# Patient Record
Sex: FEMALE | Race: WHITE | Employment: OTHER | URBAN - METROPOLITAN AREA
[De-identification: names, ages, dates, MRNs, and addresses within clinical notes are randomized per-mention and may not be internally consistent; named-entity substitution may affect disease eponyms.]

---

## 2017-01-24 ENCOUNTER — OFFICE VISIT (OUTPATIENT)
Dept: INTERNAL MEDICINE CLINIC | Facility: CLINIC | Age: 82
End: 2017-01-24

## 2017-01-24 VITALS
DIASTOLIC BLOOD PRESSURE: 67 MMHG | TEMPERATURE: 98 F | SYSTOLIC BLOOD PRESSURE: 161 MMHG | WEIGHT: 151 LBS | BODY MASS INDEX: 27.79 KG/M2 | HEART RATE: 76 BPM | HEIGHT: 62 IN

## 2017-01-24 DIAGNOSIS — E11.9 TYPE 2 DIABETES MELLITUS WITHOUT COMPLICATION, WITHOUT LONG-TERM CURRENT USE OF INSULIN (HCC): ICD-10-CM

## 2017-01-24 DIAGNOSIS — I10 ESSENTIAL HYPERTENSION: ICD-10-CM

## 2017-01-24 DIAGNOSIS — E53.8 VITAMIN B12 DEFICIENCY: ICD-10-CM

## 2017-01-24 DIAGNOSIS — E78.5 HYPERLIPIDEMIA, UNSPECIFIED HYPERLIPIDEMIA TYPE: ICD-10-CM

## 2017-01-24 DIAGNOSIS — Z12.31 VISIT FOR SCREENING MAMMOGRAM: ICD-10-CM

## 2017-01-24 DIAGNOSIS — Z78.0 POSTMENOPAUSAL: ICD-10-CM

## 2017-01-24 DIAGNOSIS — Z00.00 ENCOUNTER FOR MEDICARE ANNUAL WELLNESS EXAM: Primary | ICD-10-CM

## 2017-01-24 DIAGNOSIS — E53.8 B12 DEFICIENCY: ICD-10-CM

## 2017-01-24 DIAGNOSIS — Z12.11 SCREEN FOR COLON CANCER: ICD-10-CM

## 2017-01-24 PROCEDURE — 99213 OFFICE O/P EST LOW 20 MIN: CPT | Performed by: INTERNAL MEDICINE

## 2017-01-24 PROCEDURE — G0439 PPPS, SUBSEQ VISIT: HCPCS | Performed by: INTERNAL MEDICINE

## 2017-01-24 RX ORDER — HYDROCHLOROTHIAZIDE 25 MG/1
25 TABLET ORAL DAILY
Qty: 90 TABLET | Refills: 3 | Status: ON HOLD | OUTPATIENT
Start: 2017-01-24 | End: 2017-05-30

## 2017-01-24 RX ORDER — METOPROLOL TARTRATE 50 MG/1
50 TABLET, FILM COATED ORAL 2 TIMES DAILY
Qty: 180 TABLET | Refills: 1 | Status: SHIPPED | OUTPATIENT
Start: 2017-01-24 | End: 2017-07-21

## 2017-01-24 RX ORDER — CELECOXIB 200 MG/1
200 CAPSULE ORAL DAILY
Qty: 90 CAPSULE | Refills: 0 | Status: SHIPPED | OUTPATIENT
Start: 2017-01-24 | End: 2017-05-06

## 2017-01-24 RX ORDER — BENAZEPRIL HYDROCHLORIDE 40 MG/1
40 TABLET, FILM COATED ORAL DAILY
Qty: 90 TABLET | Refills: 3 | Status: SHIPPED | OUTPATIENT
Start: 2017-01-24 | End: 2018-03-22

## 2017-01-24 RX ORDER — LOPERAMIDE HYDROCHLORIDE 2 MG/1
2 CAPSULE ORAL 4 TIMES DAILY PRN
COMMUNITY

## 2017-01-24 RX ORDER — GLUTAMINE 500 MG
2 CAPSULE ORAL 2 TIMES DAILY
COMMUNITY
End: 2020-02-18

## 2017-01-24 RX ORDER — CYANOCOBALAMIN 1000 UG/ML
INJECTION INTRAMUSCULAR; SUBCUTANEOUS
Qty: 6 VIAL | Refills: 3 | Status: SHIPPED | OUTPATIENT
Start: 2017-01-24 | End: 2018-02-13

## 2017-01-24 RX ORDER — AMLODIPINE BESYLATE 10 MG/1
10 TABLET ORAL DAILY
Qty: 90 TABLET | Refills: 3 | Status: SHIPPED | OUTPATIENT
Start: 2017-01-24 | End: 2018-01-31

## 2017-01-24 RX ORDER — AMINO ACIDS/MV,IRON,MIN
TABLET ORAL
Status: ON HOLD | COMMUNITY
End: 2017-05-28

## 2017-01-25 NOTE — PROGRESS NOTES
HPI:    Patient ID: Tabby Sands is a 80year old female.     HPI    Establish care  Here for Physical examinaiton/ medicare annual and  Follow up of chornic medical problems including but not limited to   Diabetes type   She moved from [facility] in Oct 2016  Gretchen Pink SURGICAL HISTORY      Comment ileum resected bc of radiation damage    HERNIA SURGERY  8799    Comment umbilical hernia     KNEE ARTHROSCOPY  1997    Comment torn cartilage    HYSTERECTOMY  1995    Comment total    APPENDECTOMY  1954    BIOPSY OF STOMACH,B abdominal pain, diarrhea, constipation and blood in stool. Genitourinary: Negative for dysuria, urgency, frequency, hematuria and difficulty urinating. Musculoskeletal: Positive for back pain, arthralgias and gait problem. Negative for joint swelling. disease)    • Arthritis    • Essential hypertension    • Back problem    • Spinal stenosis    • Degenerative disc disease, lumbar    • Diabetes (HCC)      diet controlled   • Garcia's esophagus determined by endoscopy    • B12 deficiency      on monthly s rhythm, S1 normal, S2 normal, normal heart sounds and intact distal pulses. Exam reveals no gallop. No murmur heard. Pulmonary/Chest: Effort normal and breath sounds normal. No respiratory distress. She has no wheezes. She has no rales.  She exhibits n (CPT=77080)        dexa ordered      (Z12.31) Visit for screening mammogram  Plan: [facility] SCREENING BILAT (CPT=77067)        asymptoamtic monitor    (Z12.11) Screen for colon cancer  Comment: last was 2 years ago in [address] [place] [address] St: complted    (E53.8) Vitamin Yes    Difficulty dressing or bathing?: No    Problems with daily activities? : Yes    Memory Problems?: No      Fall/Risk Assessment     Do you have 3 or more medical conditions?: 0-No    Have you fallen in the last 12 months?: 0-No    Do you accidently l Metoprolol Tartrate 50 MG Oral Tab Take 1 tablet (50 mg total) by mouth 2 (two) times daily. Disp: 180 tablet Rfl: 1   hydrochlorothiazide 25 MG Oral Tab Take 1 tablet (25 mg total) by mouth daily.  Disp: 90 tablet Rfl: 3   celecoxib 200 MG Oral Cap Take Mother      cva at 80      SOCIAL HISTORY:     Smoking Status: Former Smoker                   Packs/Day: 0.20  Years: 3         Types: Cigarettes    Smokeless Status: Former User                       Alcohol Use: Yes           0.0 oz/week       0 Standar for: FOB No flowsheet data found. Glaucoma Screening      Ophthalmology Visit Annually Yearly advised    Bone Density Screening      Dexascan Every two years No results found for this or any previous visit. No flowsheet data found.     Pap and Pelvic COPD      Spirometry Testing Annually No results found for this or any previous visit. No flowsheet data found.       SCREENING SCHEDULE - FEMALE      SCREEN COVERAGE SCHEDULE  (If Indicated) LAST DONE   Dexa If at Risk q2 years ordered   Lipids All Vanessa Oral Tab 90 tablet 3      Sig: Take 1 tablet (25 mg total) by mouth daily. celecoxib 200 MG Oral Cap 90 capsule 0      Sig: Take 1 capsule (200 mg total) by mouth daily.       tramadol-acetaminophen 37.5-325 MG Oral Tab 90 tablet 0      Sig: Take 1 tab

## 2017-01-27 NOTE — TELEPHONE ENCOUNTER
Pt states that she was in the office on 1-23-17 and doctor forget to call in a script for omeprazole, tramadol -acetaminophen 2 tablets every 6 hours. Pt would like refills on them.  Pharmacy: [facility]     Current Outpatient Prescriptions:  trama

## 2017-01-30 RX ORDER — OMEPRAZOLE 20 MG/1
20 CAPSULE, DELAYED RELEASE ORAL
Qty: 90 CAPSULE | Refills: 0 | Status: SHIPPED | OUTPATIENT
Start: 2017-01-30 | End: 2017-04-15

## 2017-01-30 NOTE — TELEPHONE ENCOUNTER
Refill Protocol Appointment Criteria  · Appointment scheduled in the past 12 months or in the next 3 months  Recent Visits       Provider Department Primary Dx    6 days ago Justin Castro MD [facility], [facility], [address] [facility], [address]

## 2017-01-30 NOTE — TELEPHONE ENCOUNTER
Pharmacy      [place] [id number] [facility], [place] - [address] [facility], [facility], [phone], [phone]       Medication Detail         Disp Refills Start End        tramadol-acetaminophen 37.5-325 MG Oral Tab 90 ta

## 2017-04-06 ENCOUNTER — TELEPHONE (OUTPATIENT)
Dept: INTERNAL MEDICINE CLINIC | Facility: CLINIC | Age: 82
End: 2017-04-06

## 2017-04-08 NOTE — TELEPHONE ENCOUNTER
Refill Protocol Appointment Criteria  · Appointment scheduled in the past 6 months or in the next 3 months  Recent Visits       Provider Department Primary Dx    2 months ago Megan Butterfield MD [facility], [facility], [address] [facility], [address] [facility]

## 2017-04-10 NOTE — TELEPHONE ENCOUNTER
Pharmacy      [place] [id number] [facility], [place] - [address], [phone], [phone]       Medication Detail         Disp Refills Start End        tramadol-acetaminophen 37.5-325 MG Oral Tab 60 ta

## 2017-04-10 NOTE — TELEPHONE ENCOUNTER
Pt is calling to follow up on refill request, pt states [facility] has been calling and has not heard back. Pt states she really needs her medication.  Please advise

## 2017-04-13 ENCOUNTER — TELEPHONE (OUTPATIENT)
Dept: INTERNAL MEDICINE CLINIC | Facility: CLINIC | Age: 82
End: 2017-04-13

## 2017-04-15 RX ORDER — OMEPRAZOLE 20 MG/1
20 CAPSULE, DELAYED RELEASE ORAL
Qty: 90 CAPSULE | Refills: 0 | Status: SHIPPED | OUTPATIENT
Start: 2017-04-15 | End: 2017-07-10

## 2017-04-15 NOTE — TELEPHONE ENCOUNTER
Refill Protocol Appointment Criteria: Refilled per protocol    · Appointment scheduled in the past 12 months or in the next 3 months  Recent Visits       Provider Department Primary Dx    2 months ago Yin Stanley MD [facility], [facility], [address]

## 2017-04-18 NOTE — TELEPHONE ENCOUNTER
I called [address] who stated the patient picked up the Tramadol on 4/10/17. The patient is requesting another refill    LMB transfer to [phone] or [phone] on 4/18/17 for the patient. Why she is requesting a refill so soon.

## 2017-04-20 NOTE — TELEPHONE ENCOUNTER
Carmita Smith calling states rx pt picke dup on 04/10 was for 10 days. Pt requesting refill for at least 30 days with refills if possible. Please advise.

## 2017-05-01 ENCOUNTER — TELEPHONE (OUTPATIENT)
Dept: INTERNAL MEDICINE CLINIC | Facility: CLINIC | Age: 82
End: 2017-05-01

## 2017-05-01 NOTE — TELEPHONE ENCOUNTER
Current Outpatient Prescriptions:  celecoxib 200 MG Oral Cap Take 1 capsule (200 mg total) by mouth daily.  Disp: 90 capsule Rfl: 0     Pharmacy said fax sent- nothing in Epic

## 2017-05-06 NOTE — TELEPHONE ENCOUNTER
From: Gaurav Leon  To: Adam Pedro MD  Sent: 5/1/2017 7:00 PM CDT  Subject: Medication Renewal Request    Original authorizing provider: MD Gaurav Diaz Leon would like a refill of the following medications:  celecoxib 200 MG Oral Cap

## 2017-05-08 RX ORDER — CELECOXIB 200 MG/1
200 CAPSULE ORAL DAILY
Qty: 90 CAPSULE | Refills: 0 | Status: ON HOLD | OUTPATIENT
Start: 2017-05-08 | End: 2017-05-28

## 2017-05-09 NOTE — TELEPHONE ENCOUNTER
Rx request for Celecoxib 200 mg addressed and filled by MD on 5/8/17 #90 with 0 refills. No further action required at this time.

## 2017-05-17 NOTE — TELEPHONE ENCOUNTER
From: David Hernandez  To: Aramis Cheung MD  Sent: 5/16/2017 11:34 AM CDT  Subject: Medication Renewal Request    Original authorizing provider: Merilynn Apley, MD Dimas Lunger would like a refill of the following medications:  tramadol-acetaminophen

## 2017-05-27 ENCOUNTER — APPOINTMENT (OUTPATIENT)
Dept: GENERAL RADIOLOGY | Facility: HOSPITAL | Age: 82
DRG: 087 | End: 2017-05-27
Attending: EMERGENCY MEDICINE
Payer: MEDICARE

## 2017-05-27 ENCOUNTER — APPOINTMENT (OUTPATIENT)
Dept: CT IMAGING | Facility: HOSPITAL | Age: 82
DRG: 087 | End: 2017-05-27
Attending: EMERGENCY MEDICINE
Payer: MEDICARE

## 2017-05-27 ENCOUNTER — HOSPITAL ENCOUNTER (INPATIENT)
Facility: HOSPITAL | Age: 82
LOS: 2 days | Discharge: HOME | DRG: 087 | End: 2017-05-30
Attending: EMERGENCY MEDICINE | Admitting: HOSPITALIST
Payer: MEDICARE

## 2017-05-27 DIAGNOSIS — R55 SYNCOPE AND COLLAPSE: Primary | ICD-10-CM

## 2017-05-27 DIAGNOSIS — D64.9 ANEMIA, UNSPECIFIED TYPE: ICD-10-CM

## 2017-05-27 PROBLEM — E87.6 HYPOKALEMIA: Status: ACTIVE | Noted: 2017-05-27

## 2017-05-27 PROBLEM — R79.89 AZOTEMIA: Status: ACTIVE | Noted: 2017-05-27

## 2017-05-27 PROBLEM — R73.9 HYPERGLYCEMIA: Status: ACTIVE | Noted: 2017-05-27

## 2017-05-27 PROCEDURE — 72125 CT NECK SPINE W/O DYE: CPT | Performed by: EMERGENCY MEDICINE

## 2017-05-27 PROCEDURE — 71010 XR CHEST AP PORTABLE  (CPT=71010): CPT | Performed by: EMERGENCY MEDICINE

## 2017-05-27 PROCEDURE — 70450 CT HEAD/BRAIN W/O DYE: CPT | Performed by: EMERGENCY MEDICINE

## 2017-05-27 RX ORDER — ASPIRIN 81 MG/1
324 TABLET, CHEWABLE ORAL ONCE
Status: COMPLETED | OUTPATIENT
Start: 2017-05-27 | End: 2017-05-27

## 2017-05-27 RX ORDER — POTASSIUM CHLORIDE 20 MEQ/1
40 TABLET, EXTENDED RELEASE ORAL ONCE
Status: COMPLETED | OUTPATIENT
Start: 2017-05-27 | End: 2017-05-27

## 2017-05-28 ENCOUNTER — APPOINTMENT (OUTPATIENT)
Dept: ULTRASOUND IMAGING | Facility: HOSPITAL | Age: 82
DRG: 087 | End: 2017-05-28
Attending: HOSPITALIST
Payer: MEDICARE

## 2017-05-28 ENCOUNTER — APPOINTMENT (OUTPATIENT)
Dept: CT IMAGING | Facility: HOSPITAL | Age: 82
DRG: 087 | End: 2017-05-28
Attending: EMERGENCY MEDICINE
Payer: MEDICARE

## 2017-05-28 ENCOUNTER — APPOINTMENT (OUTPATIENT)
Dept: CT IMAGING | Facility: HOSPITAL | Age: 82
DRG: 087 | End: 2017-05-28
Attending: HOSPITALIST
Payer: MEDICARE

## 2017-05-28 PROBLEM — D64.9 ANEMIA, UNSPECIFIED TYPE: Status: ACTIVE | Noted: 2017-05-28

## 2017-05-28 PROCEDURE — 71260 CT THORAX DX C+: CPT | Performed by: EMERGENCY MEDICINE

## 2017-05-28 PROCEDURE — 93970 EXTREMITY STUDY: CPT | Performed by: HOSPITALIST

## 2017-05-28 PROCEDURE — 99222 1ST HOSP IP/OBS MODERATE 55: CPT | Performed by: HOSPITALIST

## 2017-05-28 PROCEDURE — 70450 CT HEAD/BRAIN W/O DYE: CPT | Performed by: HOSPITALIST

## 2017-05-28 RX ORDER — GLUTAMINE 500 MG
1 CAPSULE ORAL DAILY
Status: DISCONTINUED | OUTPATIENT
Start: 2017-05-28 | End: 2017-05-28 | Stop reason: RX

## 2017-05-28 RX ORDER — POTASSIUM CHLORIDE 20 MEQ/1
40 TABLET, EXTENDED RELEASE ORAL EVERY 4 HOURS
Status: DISPENSED | OUTPATIENT
Start: 2017-05-28 | End: 2017-05-28

## 2017-05-28 RX ORDER — HEPARIN SODIUM 5000 [USP'U]/ML
5000 INJECTION, SOLUTION INTRAVENOUS; SUBCUTANEOUS EVERY 12 HOURS SCHEDULED
Status: DISCONTINUED | OUTPATIENT
Start: 2017-05-28 | End: 2017-05-28

## 2017-05-28 RX ORDER — PANTOPRAZOLE SODIUM 20 MG/1
20 TABLET, DELAYED RELEASE ORAL
Status: DISCONTINUED | OUTPATIENT
Start: 2017-05-28 | End: 2017-05-30

## 2017-05-28 RX ORDER — LISINOPRIL 40 MG/1
40 TABLET ORAL DAILY
Status: DISCONTINUED | OUTPATIENT
Start: 2017-05-28 | End: 2017-05-30

## 2017-05-28 RX ORDER — DEXTROSE MONOHYDRATE 25 G/50ML
50 INJECTION, SOLUTION INTRAVENOUS AS NEEDED
Status: DISCONTINUED | OUTPATIENT
Start: 2017-05-28 | End: 2017-05-30

## 2017-05-28 RX ORDER — ONDANSETRON 2 MG/ML
4 INJECTION INTRAMUSCULAR; INTRAVENOUS EVERY 4 HOURS PRN
Status: DISCONTINUED | OUTPATIENT
Start: 2017-05-28 | End: 2017-05-30

## 2017-05-28 RX ORDER — SODIUM CHLORIDE 9 MG/ML
INJECTION, SOLUTION INTRAVENOUS CONTINUOUS
Status: DISCONTINUED | OUTPATIENT
Start: 2017-05-28 | End: 2017-05-30

## 2017-05-28 RX ORDER — METOPROLOL TARTRATE 50 MG/1
50 TABLET, FILM COATED ORAL 2 TIMES DAILY
Status: DISCONTINUED | OUTPATIENT
Start: 2017-05-28 | End: 2017-05-30

## 2017-05-28 RX ORDER — SODIUM CHLORIDE 9 MG/ML
INJECTION, SOLUTION INTRAVENOUS CONTINUOUS
Status: DISPENSED | OUTPATIENT
Start: 2017-05-28 | End: 2017-05-28

## 2017-05-28 RX ORDER — ASPIRIN 325 MG
325 TABLET ORAL DAILY
Status: DISCONTINUED | OUTPATIENT
Start: 2017-05-28 | End: 2017-05-28

## 2017-05-28 RX ORDER — NITROGLYCERIN 0.4 MG/1
0.4 TABLET SUBLINGUAL EVERY 5 MIN PRN
Status: DISCONTINUED | OUTPATIENT
Start: 2017-05-28 | End: 2017-05-30

## 2017-05-28 NOTE — ED INITIAL ASSESSMENT (HPI)
Pt received via EMS for syncopal episode while at the movies. Pt states she was walking out of the movie theatre and woke up on the floor. Hematoma noted to left temporal. Denies any anticoagulants. Alert and oriented x4.

## 2017-05-28 NOTE — CONSULTS
[facility]    PATIENT'S NAME: Naye Bárbara   ATTENDING PHYSICIAN: Darlyn Mackay MD   CONSULTING PHYSICIAN: Marlo Paul MD   PATIENT ACCOUNT#:   [id number]    LOCATION:  [address] [place] [address] #:   [id number]       LAWRENCE fissure. There is also mild frontal atrophy with enlarged subarachnoid space in the frontal region, but it showed otherwise no significant abnormality.   She had significant limitations with shoulder movement because of severe arthritis, and she normally t both the CT scan from last night and the CT scan from this morning. She has a small globular amount of blood in the left temporal area. There was no obvious change on today's study compared to the one done last night.   She will have a repeat CT scan of t

## 2017-05-28 NOTE — BH PROGRESS NOTE
ADMISSION NOTE    80year old female with h/o HTN, chronic pain syndrome, diet controlled DM presents after a drop attack. There was no prodrome. . Available medical records partially reviewed. Dictation to follow.     Júnior Moreno M.D.  5/28/2017

## 2017-05-28 NOTE — PROGRESS NOTES
Radiology called reported update of findings on ct of spine.  MD HA paged and reported the above findings awaiting return call

## 2017-05-28 NOTE — PROGRESS NOTES
Please refer to Dr. Doshi Must admit note for details  -CT scan noted for [facility] today  -patient neurologically intact  -plan repeat CT now  -stopped asa and sq heparin    Emma Dumont MD

## 2017-05-28 NOTE — CONSULTS
/47 mmHg  Pulse 65  Temp(Src) 98 °F (36.7 °C) (Oral)  Resp 20  Ht 5' 1\" (1.549 m)  Wt 151 lb 12.8 oz (68.856 kg)  BMI 28.70 kg/m2  SpO2 95%    Full consult dictated. Mrs. Fabiola Quintana sustained a closed head injury and has a small SAH in the left tempo

## 2017-05-28 NOTE — ED PROVIDER NOTES
Patient Seen in: [facility] AND [facility] Emergency Department    History   Patient presents with:  Syncope (cardiovascular, neurologic)    Stated Complaint: syncopal    HPI    80-year-old female with history of hypertension, spinal stenosis, GERD and hyperlipi tramadol-acetaminophen 37.5-325 MG Oral Tab,  Take 2 tablets by mouth every 8 (eight) hours as needed for Pain. celecoxib 200 MG Oral Cap,  Take 1 capsule (200 mg total) by mouth daily.    celecoxib 200 MG Oral Cap,  Take 1 capsule (200 mg total) by mouth Temporal   SpO2 05/27/17 2109 98 %   O2 Device 05/27/17 2109 None (Room air)       Current:/56 mmHg  Pulse 95  Temp(Src) 97.8 °F (36.6 °C) (Temporal)  Resp 18  Ht 154.9 cm (5' 1\")  Wt 63.504 kg  BMI 26.47 kg/m2  SpO2 99%        Physical Exam    Cons following:     RBC 3.30 (*)     HGB 10.5 (*)     HCT 31.6 (*)     All other components within normal limits   TROPONIN I, 0 HOUR - Normal   TROPONIN I, 2 HOUR - Normal   CBC WITH DIFFERENTIAL WITH PLATELET    Narrative:      The following orders were create acute fracture. Mild multilevel spondylolisthesis, likely due to chronic degenerative disease. Severe degenerative changes. Case discussed with  Dr. Mannie Berger at 11:27 PM ET. Geovanni Barkley M.D.   This report has been electronically signed and verif

## 2017-05-28 NOTE — H&P
[facility]    PATIENT'S NAME: Cecilia Silva   ATTENDING PHYSICIAN: John Aguirre MD   PATIENT ACCOUNT#:   [id number]    LOCATION:  [address] [place] [address] #:   [id number]       YOB: 1932  ADMISSION DATE:       05/2 post hysterectomy with remote radiation treatments, which has caused her chronic diarrhea. 7.   Garcia's esophagus seen on the EGD in the past.  The patient states her most recent EGD a year ago did not reveal Garcia's.   8.   B12 deficiency, on bimonth VITAL SIGNS:  Temperature 97.5, pulse 98, respiratory rate 18, blood pressure 125/72. HEENT:  There is a hematoma and superficial abrasion without laceration to the anterior left temporal area. No other obvious signs of trauma seen.   Pupils are equal, elevated D-dimer, a CT scan of the chest to rule out PE was performed. This revealed no evidence for pulmonary embolism or thoracic aortic dissection or aneurysm.   There were some scattered subsegmental atelectasis, no consolidation, and severe degenerati above.    Dictated By Jazlyn Dunlap MD  d: 05/28/2017 05:15:01  t: 05/28/2017 05:51:38  [facility] [id number]  QKX/

## 2017-05-29 ENCOUNTER — APPOINTMENT (OUTPATIENT)
Dept: CT IMAGING | Facility: HOSPITAL | Age: 82
DRG: 087 | End: 2017-05-29
Attending: PHYSICIAN ASSISTANT
Payer: MEDICARE

## 2017-05-29 ENCOUNTER — APPOINTMENT (OUTPATIENT)
Dept: CV DIAGNOSTICS | Facility: HOSPITAL | Age: 82
DRG: 087 | End: 2017-05-29
Attending: HOSPITALIST
Payer: MEDICARE

## 2017-05-29 PROCEDURE — 99233 SBSQ HOSP IP/OBS HIGH 50: CPT | Performed by: HOSPITALIST

## 2017-05-29 PROCEDURE — 70450 CT HEAD/BRAIN W/O DYE: CPT | Performed by: PHYSICIAN ASSISTANT

## 2017-05-29 PROCEDURE — 93306 TTE W/DOPPLER COMPLETE: CPT | Performed by: HOSPITALIST

## 2017-05-29 RX ORDER — MAGNESIUM OXIDE 400 MG (241.3 MG MAGNESIUM) TABLET
800 TABLET ONCE
Status: COMPLETED | OUTPATIENT
Start: 2017-05-29 | End: 2017-05-29

## 2017-05-29 NOTE — PROGRESS NOTES
[facility]    Progress Note    Matthew Florez Patient Status:  Inpatient    10/25/1932 MRN [id number]   Location [facility] 3W/SW Attending Vinh Sanchez MD   Hosp Day # 2 PCP Nasreen Kim MD       Subjective:   Michelle Armando hemorrhage in the left subinsular cortex is not significantly changed. No acute interval changes. Left frontotemporal scalp contusion.       Ct Brain Or Head (63820)    5/28/2017  CONCLUSION: Mild microvascular ischemic disease with unchanged tiny hyperden 2. No acute intracranial hemorrhage. 3. Superficial left frontal temporal calvarial soft tissue scalp hematoma. 4. Incidental osteoma within the right frontal sinus measures 2 x 1 cm.     Dictated by (CST): Vanesa Rosario M.D. on 5/28/2017 at 7:15 1. Normal venous duplex exam. No deep venous thrombosis. 2. Large left popliteal fossa Baker's cyst measures 6 x 2.6 x 1.9 cm. Xr Chest Ap Portable  (cpt=71010)    5/28/2017  CONCLUSION:  No acute cardiopulmonary abnormality.          Ct Chest Pain/ Certification of Need for Inpatient Hospitalization - Initial Certification      Patient will require inpatient services that will reasonably be expected to span two midnight's based on the clinical documentation in H+P.      Based on patients current state

## 2017-05-29 NOTE — PROGRESS NOTES
S: Ms. Sanabria Brittle resting comfortably in bed. She has no complaints at this time except that she wants to go home. She is supposed to have a stress test, but it will not be done until tomorrow.       O: Temp:  [97 °F (36.1 °C)-98 °F (36.7 °C)] 97.3 °F (36.3 °

## 2017-05-30 ENCOUNTER — APPOINTMENT (OUTPATIENT)
Dept: CV DIAGNOSTICS | Facility: HOSPITAL | Age: 82
DRG: 087 | End: 2017-05-30
Attending: HOSPITALIST
Payer: MEDICARE

## 2017-05-30 ENCOUNTER — APPOINTMENT (OUTPATIENT)
Dept: NUCLEAR MEDICINE | Facility: HOSPITAL | Age: 82
DRG: 087 | End: 2017-05-30
Attending: HOSPITALIST
Payer: MEDICARE

## 2017-05-30 VITALS
WEIGHT: 150.81 LBS | BODY MASS INDEX: 28.47 KG/M2 | TEMPERATURE: 98 F | DIASTOLIC BLOOD PRESSURE: 53 MMHG | SYSTOLIC BLOOD PRESSURE: 130 MMHG | RESPIRATION RATE: 20 BRPM | OXYGEN SATURATION: 99 % | HEART RATE: 86 BPM | HEIGHT: 61 IN

## 2017-05-30 PROCEDURE — 93017 CV STRESS TEST TRACING ONLY: CPT | Performed by: HOSPITALIST

## 2017-05-30 PROCEDURE — 99239 HOSP IP/OBS DSCHRG MGMT >30: CPT | Performed by: HOSPITALIST

## 2017-05-30 PROCEDURE — 78452 HT MUSCLE IMAGE SPECT MULT: CPT | Performed by: HOSPITALIST

## 2017-05-30 RX ORDER — MAGNESIUM OXIDE 400 MG (241.3 MG MAGNESIUM) TABLET
800 TABLET ONCE
Status: COMPLETED | OUTPATIENT
Start: 2017-05-30 | End: 2017-05-30

## 2017-05-30 RX ORDER — POTASSIUM CHLORIDE 20 MEQ/1
40 TABLET, EXTENDED RELEASE ORAL ONCE
Status: COMPLETED | OUTPATIENT
Start: 2017-05-30 | End: 2017-05-30

## 2017-05-30 RX ORDER — 0.9 % SODIUM CHLORIDE 0.9 %
VIAL (ML) INJECTION
Status: COMPLETED
Start: 2017-05-30 | End: 2017-05-30

## 2017-05-30 NOTE — PROGRESS NOTES
[facility]    Neurosurgery Progress Note    Rissa Mosqueda Patient Status:  Inpatient    10/25/1932 MRN [id number]   Location [facility] 3W/SW Attending Jeanna Watts MD   Hosp Day # 3 PCP Nasreen Recinos MD     Subjective: Non-tender, no lower extremity edema noted. Imaging:  Ct Brain Or Head (00937)    5/29/2017  CONCLUSION:   Punctate hyperdense focus of hemorrhage in the left subinsular cortex is not significantly changed. No acute interval changes.   Left frontotem

## 2017-05-30 NOTE — DISCHARGE PLANNING
Patient A/O. Denies any pain or SOB. Patient to be discharged to home with daughter. Discharge instructions included medications, prescriptions, safety, and fluid intake. Reviewed with patient. Patient verbalized understanding and compliance.  Tele monitor

## 2017-05-30 NOTE — DISCHARGE SUMMARY
[facility] AND [facility]  Discharge Summary    Donovan Coil Patient Status:  Inpatient    10/25/1932 MRN [id number]   Location [facility] 3W/SW Attending Kasey Jenkins MD   Hosp Day # 3 PCP Nasreen Arevalo MD     Date of Admission: 2017    D [address] [address] [place] [address]  [phone]      Patient will schedule diabetes follow-up appointment      Follow up Labs: n/a     History of Present Illness:   Per Dr. Shey Grajeda Syncopal episode.     HISTORY OF PRESENT ILLNESS:  This is a insulin.     Hypotension.   -hold diuretics on discharge    HTN  -resume home meds aside from hctz     Spinal stenosis and severe diffuse arthritis.  Continue home pain medications.     History of uterine cancer, status post radiation treatments with chroni

## 2017-05-31 ENCOUNTER — TELEPHONE (OUTPATIENT)
Dept: OTHER | Facility: HOSPITAL | Age: 82
End: 2017-05-31

## 2017-06-07 ENCOUNTER — TELEPHONE (OUTPATIENT)
Dept: INTERNAL MEDICINE CLINIC | Facility: CLINIC | Age: 82
End: 2017-06-07

## 2017-06-12 NOTE — TELEPHONE ENCOUNTER
Pt is calling about this refill.  She is highly frustrated that it has been since 06/07 that she requested this refill      Current outpatient prescriptions:     •  tramadol-acetaminophen 37.5-325 MG Oral Tab, Take 2 tablets by mouth every 8 (eight) hours a

## 2017-06-30 NOTE — TELEPHONE ENCOUNTER
From: Kendaller Push  Sent: 6/27/2017 10:23 PM CDT  Subject: Medication Renewal Request    Adolph Neftali.  Lizett Munson would like a refill of the following medications:  tramadol-acetaminophen 37.5-325 MG Oral Tab Virgil Murrieta MD]    Preferred pharmacy: [address]

## 2017-07-05 NOTE — TELEPHONE ENCOUNTER
Patient called and stated spoke with [facility] regarding refill has not received-    Patient still waiting  Requesting a call back from nurse-

## 2017-07-10 RX ORDER — OMEPRAZOLE 20 MG/1
CAPSULE, DELAYED RELEASE ORAL
Qty: 90 CAPSULE | Refills: 1 | Status: SHIPPED | OUTPATIENT
Start: 2017-07-10 | End: 2017-10-20

## 2017-07-20 ENCOUNTER — TELEPHONE (OUTPATIENT)
Dept: INTERNAL MEDICINE CLINIC | Facility: CLINIC | Age: 82
End: 2017-07-20

## 2017-07-21 RX ORDER — METOPROLOL TARTRATE 50 MG/1
50 TABLET, FILM COATED ORAL 2 TIMES DAILY
Qty: 180 TABLET | Refills: 0 | Status: SHIPPED | OUTPATIENT
Start: 2017-07-21 | End: 2017-10-12

## 2017-07-21 NOTE — TELEPHONE ENCOUNTER
Refilled per protocol      Hypertensive Medications  Protocol Criteria:  · Appointment scheduled in the past 6 months or in the next 3 months  · BMP or CMP in the past 12 months  · Creatinine result < 2  Recent Outpatient Visits            5 months ago [facility]

## 2017-07-27 NOTE — TELEPHONE ENCOUNTER
Pt is calling state that she is completely out of her medication want to know if it can be sent to pharm asap     Current Outpatient Prescriptions:  tramadol-acetaminophen 37.5-325 MG Oral Tab Take 2 tablets by mouth every 8 (eight) hours as needed for [employer]

## 2017-07-28 NOTE — TELEPHONE ENCOUNTER
Patient called and stated checking status of refill request-  Patient is currently out of medication

## 2017-07-28 NOTE — TELEPHONE ENCOUNTER
Patient calling again for status, states she is out of medication.     Order for Tramadol pended for approval.

## 2017-07-31 NOTE — TELEPHONE ENCOUNTER
Pt called and states Tramadol not at pharmacy. Pt preferred pharmacy contacted and medication below refilled per MD authorization.                   Approved Medication Requests         Tramadol-Acetaminophen 2 tablets Oral Every 8 hours PRN      Medicat

## 2017-08-15 NOTE — TELEPHONE ENCOUNTER
From: Karly Fry  Sent: 8/11/2017 5:34 PM CDT  Subject: Medication Renewal Request    Chiquita Trinidad.  Isaac Macdonald would like a refill of the following medications:  tramadol-acetaminophen 37.5-325 MG Oral Tab Jaydon Siddiqi MD]    Preferred pharmacy: [facility]

## 2017-08-15 NOTE — TELEPHONE ENCOUNTER
[facility] pharmacy still does not have rx.        Current Outpatient Prescriptions:  TRAMADOL-ACETAMINOPHEN 37.5-325 MG Oral Tab TAKE 2 TABLETS BY MOUTH EVERY 8 HOURS Disp: 90 tablet Rfl: 0

## 2017-09-08 NOTE — TELEPHONE ENCOUNTER
From: Fabiano Costa  Sent: 9/7/2017 1:27 PM CDT  Subject: Medication Renewal Request    Blayne Foley.  Nato Leyva would like a refill of the following medications:  TRAMADOL-ACETAMINOPHEN 37.5-325 MG Oral Tab Janay Murphy MD]    Preferred pharmacy: [facility]

## 2017-10-02 NOTE — TELEPHONE ENCOUNTER
From: Matthew Florez  Sent: 10/2/2017 12:37 PM CDT  Subject: Medication Renewal Request    Stu Herrera.  Everett Ramos would like a refill of the following medications:     TRAMADOL-ACETAMINOPHEN 37.5-325 MG Oral Tab Cornelius Bustamante MD]    Preferred pharmacy: [facility]

## 2017-10-02 NOTE — TELEPHONE ENCOUNTER
LR 9/10/17 #90 tramadol - acetaminophen refill request    Non-Narcotic Pain Medication:  Refill Protocol Appointment Criteria  · Appointment scheduled in the past 6 months or in the next 3 months  Recent Outpatient Visits            8 months ago Encounter

## 2017-10-02 NOTE — TELEPHONE ENCOUNTER
From: Kelley Garcia  Sent: 10/2/2017 12:40 PM CDT  Subject: Medication Renewal Request    Francisco Zapata.  Rosmery Cha would like a refill of the following medications:     TRAMADOL-ACETAMINOPHEN 37.5-325 MG Oral Tab Chen Rivera MD]    Preferred pharmacy: [facility]

## 2017-10-12 NOTE — TELEPHONE ENCOUNTER
Hypertensive Medications Protocol Failed  Hypertensive Medications  Protocol Criteria:  · Appointment scheduled in the past 6 months or in the next 3 months  · BMP or CMP in the past 12 months  · Creatinine result < 2  Recent Outpatient Visits            8

## 2017-10-13 ENCOUNTER — OFFICE VISIT (OUTPATIENT)
Dept: NEUROLOGY | Facility: CLINIC | Age: 82
End: 2017-10-13

## 2017-10-13 VITALS
DIASTOLIC BLOOD PRESSURE: 66 MMHG | HEIGHT: 63 IN | BODY MASS INDEX: 25.87 KG/M2 | WEIGHT: 146 LBS | HEART RATE: 102 BPM | SYSTOLIC BLOOD PRESSURE: 134 MMHG | RESPIRATION RATE: 17 BRPM

## 2017-10-13 DIAGNOSIS — M25.511 CHRONIC PAIN OF BOTH SHOULDERS: Primary | ICD-10-CM

## 2017-10-13 DIAGNOSIS — M25.512 CHRONIC PAIN OF BOTH SHOULDERS: Primary | ICD-10-CM

## 2017-10-13 DIAGNOSIS — G89.29 CHRONIC PAIN OF BOTH SHOULDERS: Primary | ICD-10-CM

## 2017-10-13 PROCEDURE — 99203 OFFICE O/P NEW LOW 30 MIN: CPT | Performed by: PHYSICAL MEDICINE & REHABILITATION

## 2017-10-13 RX ORDER — DULOXETIN HYDROCHLORIDE 30 MG/1
60 CAPSULE, DELAYED RELEASE ORAL DAILY
Qty: 30 CAPSULE | Refills: 1 | Status: SHIPPED | OUTPATIENT
Start: 2017-10-13 | End: 2017-11-17

## 2017-10-13 RX ORDER — METOPROLOL TARTRATE 50 MG/1
TABLET, FILM COATED ORAL
Qty: 180 TABLET | Refills: 0 | Status: SHIPPED | OUTPATIENT
Start: 2017-10-13 | End: 2018-02-27

## 2017-10-13 RX ORDER — DULOXETIN HYDROCHLORIDE 30 MG/1
CAPSULE, DELAYED RELEASE ORAL
Qty: 150 CAPSULE | Refills: 1 | OUTPATIENT
Start: 2017-10-13

## 2017-10-13 RX ORDER — CHROMIUM POLYNICOTINATE 100 %
200 POWDER (GRAM) MISCELLANEOUS 2 TIMES DAILY
COMMUNITY
End: 2018-02-13

## 2017-10-13 RX ORDER — CELECOXIB 200 MG/1
CAPSULE ORAL DAILY
Refills: 0 | COMMUNITY
Start: 2017-07-25 | End: 2018-04-28

## 2017-10-13 NOTE — H&P
No referring provider defined for this encounter.   Jim Ruth MD     PHYSICAL MEDICINE and REHABILITATION CONSULTATION    Chief Complaint: bilateral shoulder pain    HPI: Anthony Irving is a 80year old female with history of osteoarthritis, GERD and h Previous diagnostic tests: X-ray of both shoulders. This was done several years ago and is unavailable for review.   Previous treatments: As above  Review of system:    Abnormal balance: no  Rash: no  Blur vision: no  Shortness of breath: no  Chest pain: AmLODIPine Besylate 10 MG Oral Tab Take 1 tablet (10 mg total) by mouth daily. Disp: 90 tablet Rfl: 3   Benazepril HCl 40 MG Oral Tab Take 1 tablet (40 mg total) by mouth daily.  Disp: 90 tablet Rfl: 3   cyanocobalamin 1000 MCG/ML Injection Solution Give 1 General/Mental Status: Elderly  female with a forward stooped posture. Patient appears stated age, answers my questions appropriately, alert and oriented to person, place, and time. Skin: No rash and ulceration noted in the forearms and hands. Controlled type 2 diabetes mellitus without complication, without long-term current use of insulin (HCC)     Diabetes ([facility] [address]     Syncope and collapse     Hyperglycemia     Hypokalemia     Anemia     Azotemia     Anemia, unspecified type     Impression/pl

## 2017-10-13 NOTE — PATIENT INSTRUCTIONS
As of October 6th 2014, the Drug Enforcement Agency [facility]) is reclassifying all hydrocodone combination medications from Schedule III to Schedule II. This includes medications such as Norco, Vicodin, Lortab, Zohydro, and Vicoprofen.     What this means for y

## 2017-10-20 ENCOUNTER — TELEPHONE (OUTPATIENT)
Dept: INTERNAL MEDICINE CLINIC | Facility: CLINIC | Age: 82
End: 2017-10-20

## 2017-10-21 NOTE — TELEPHONE ENCOUNTER
From: Marie Osorio  Sent: 10/20/2017 6:54 PM CDT  Subject: Medication Renewal Request    Beenayosvany Kumar.  Sheryl Draper would like a refill of the following medications:     OMEPRAZOLE 20 MG Oral Capsule Delayed Release Tyrone Butcher MD]     tramadol-acetaminophen 37.5-325 MG Oral Tab Tyrone Butcher MD]    Preferred pharmacy: [facility] [address] [facility] [address] [place], [place] - [address] AT [address] [place] [address], [phone], [phone]    [facility]

## 2017-10-24 RX ORDER — OMEPRAZOLE 20 MG/1
CAPSULE, DELAYED RELEASE ORAL
Qty: 90 CAPSULE | Refills: 1 | Status: SHIPPED | OUTPATIENT
Start: 2017-10-24 | End: 2018-03-22

## 2017-10-26 ENCOUNTER — OFFICE VISIT (OUTPATIENT)
Dept: PHYSICAL THERAPY | Facility: HOSPITAL | Age: 82
End: 2017-10-26
Attending: PHYSICAL MEDICINE & REHABILITATION
Payer: MEDICARE

## 2017-10-26 DIAGNOSIS — G89.29 CHRONIC PAIN OF BOTH SHOULDERS: ICD-10-CM

## 2017-10-26 DIAGNOSIS — M25.511 CHRONIC PAIN OF BOTH SHOULDERS: ICD-10-CM

## 2017-10-26 DIAGNOSIS — M25.512 CHRONIC PAIN OF BOTH SHOULDERS: ICD-10-CM

## 2017-10-26 PROCEDURE — 97162 PT EVAL MOD COMPLEX 30 MIN: CPT

## 2017-10-26 PROCEDURE — 97110 THERAPEUTIC EXERCISES: CPT

## 2017-10-26 NOTE — PROGRESS NOTES
UPPER EXTREMITY EVALUATION:   Referring Physician: Dr. Ryan Katz  Date of Onset: 3 years ago Date of Service: 10/26/2017   Diagnosis: Chronic pain of both shoulders (M25.511,G89.29,M25.512)    PATIENT SUMMARY:   Liv Pearce is a 80year old y/o female who decreased B shoulder and scap strength, decreased B GH joint mobility, and poor posture.  These deficits are contributing to difficulty with reaching overhead and behind back, getting things out of cabinets, lifting, donning clothing, and supporting herself INDRA.    Frequency / Duration: Patient will be seen for 2 x/week or a total of 10 visits over a 90 day period. Treatment will include: Manual Therapy; Therapeutic Exercises; Neuromuscular Re-education; Therapeutic Activity;  Patient education; Home exercise p

## 2017-10-26 NOTE — TELEPHONE ENCOUNTER
Dr. Van Massey approved refills on 10/24/17 Rx called in to pharmacy. Pt notified    From: Vinnie Chaudhary  Sent: 10/26/2017  8:26 AM CDT  Subject: Medication Renewal Request    Jillian Gwen.  Júnior [facility] would like a refill of the following medications:        tramadol-acetaminophen 37.5-325 MG Oral Tab Ayesha Brand MD]    Preferred pharmacy: [place] [address] [place], [address] [facility]  [address] AT [address] [facility], [phone], [phone]

## 2017-10-26 NOTE — TELEPHONE ENCOUNTER
Pt calling checking status of refill for tramadol pt states pharmacy still does not have it. Please advise. Pt is low on med.

## 2017-11-02 ENCOUNTER — OFFICE VISIT (OUTPATIENT)
Dept: PHYSICAL THERAPY | Facility: HOSPITAL | Age: 82
End: 2017-11-02
Attending: PHYSICAL MEDICINE & REHABILITATION
Payer: MEDICARE

## 2017-11-02 PROCEDURE — 97110 THERAPEUTIC EXERCISES: CPT

## 2017-11-02 NOTE — PROGRESS NOTES
Diagnosis: Chronic pain of both shoulders (M25.511,G89.29,M25.512)     Authorized # of Visits:  2        Next MD visit: none scheduled  Fall Risk: standard         Precautions: n/a           Medication Changes since last visit?: No  Subjective: \"I have nohemy

## 2017-11-04 RX ORDER — CELECOXIB 200 MG/1
CAPSULE ORAL
Qty: 90 CAPSULE | Refills: 0 | Status: SHIPPED | OUTPATIENT
Start: 2017-11-04 | End: 2018-02-13

## 2017-11-07 ENCOUNTER — OFFICE VISIT (OUTPATIENT)
Dept: PHYSICAL THERAPY | Facility: HOSPITAL | Age: 82
End: 2017-11-07
Attending: PHYSICAL MEDICINE & REHABILITATION
Payer: MEDICARE

## 2017-11-07 PROCEDURE — 97110 THERAPEUTIC EXERCISES: CPT

## 2017-11-07 NOTE — PROGRESS NOTES
Diagnosis: Chronic pain of both shoulders (M25.511,G89.29,M25.512)     Authorized # of Visits:  3/10        Next MD visit: none scheduled  Fall Risk: standard         Precautions: n/a           Medication Changes since last visit?: No  Subjective: Patient

## 2017-11-09 ENCOUNTER — OFFICE VISIT (OUTPATIENT)
Dept: PHYSICAL THERAPY | Facility: HOSPITAL | Age: 82
End: 2017-11-09
Attending: PHYSICAL MEDICINE & REHABILITATION
Payer: MEDICARE

## 2017-11-09 PROCEDURE — 97110 THERAPEUTIC EXERCISES: CPT

## 2017-11-09 NOTE — PROGRESS NOTES
Diagnosis: Chronic pain of both shoulders (M25.511,G89.29,M25.512)     Authorized # of Visits:  4/10        Next MD visit: none scheduled  Fall Risk: standard         Precautions: n/a           Medication Changes since last visit?: No  Subjective: \"After

## 2017-11-14 ENCOUNTER — APPOINTMENT (OUTPATIENT)
Dept: PHYSICAL THERAPY | Facility: HOSPITAL | Age: 82
End: 2017-11-14
Attending: PHYSICAL MEDICINE & REHABILITATION
Payer: MEDICARE

## 2017-11-16 ENCOUNTER — OFFICE VISIT (OUTPATIENT)
Dept: PHYSICAL THERAPY | Facility: HOSPITAL | Age: 82
End: 2017-11-16
Attending: PHYSICAL MEDICINE & REHABILITATION
Payer: MEDICARE

## 2017-11-16 PROCEDURE — 97110 THERAPEUTIC EXERCISES: CPT

## 2017-11-16 NOTE — PROGRESS NOTES
Diagnosis: Chronic pain of both shoulders (M25.511,G89.29,M25.512)     Authorized # of Visits:  5/10        Next MD visit: none scheduled  Fall Risk: standard         Precautions: n/a           Medication Changes since last visit?: No  Subjective: \"I have

## 2017-11-17 ENCOUNTER — TELEPHONE (OUTPATIENT)
Dept: INTERNAL MEDICINE CLINIC | Facility: CLINIC | Age: 82
End: 2017-11-17

## 2017-11-17 RX ORDER — DULOXETIN HYDROCHLORIDE 30 MG/1
60 CAPSULE, DELAYED RELEASE ORAL DAILY
Qty: 60 CAPSULE | Refills: 1 | Status: SHIPPED | OUTPATIENT
Start: 2017-11-17 | End: 2018-01-03 | Stop reason: DRUGHIGH

## 2017-11-17 NOTE — TELEPHONE ENCOUNTER
Patient request for cymbalta 30 mg, take 2 caps daily, #60, 1 refill    LOV: 10/13/17  NOV: None  Last refilled on 10/13 for #30 with 1 refill

## 2017-11-17 NOTE — TELEPHONE ENCOUNTER
From: Karly Fry  Sent: 11/17/2017 12:26 AM CST  Subject: Medication Renewal Request    Chiquita Trinidad.  Isaac Macdonald would like a refill of the following medications:     DULoxetine HCl 30 MG Oral Cap DR Sonido Sykes, DO]    Preferred pharmacy: [facility]

## 2017-11-20 ENCOUNTER — TELEPHONE (OUTPATIENT)
Dept: OTHER | Age: 82
End: 2017-11-20

## 2017-11-20 NOTE — TELEPHONE ENCOUNTER
Received a call from patient who requested to know the status of her tramadol-acetaminophen request. Rx of 11/19/17 was called into [facility] [phone] and spoke with Marquis Scheuermann the pharmacist. Patient made aware and voiced understanding.

## 2017-11-20 NOTE — TELEPHONE ENCOUNTER
From: Em Packer  Sent: 11/17/2017 12:26 AM CST  Subject: Medication Renewal Request    Lobo Larsen.  Toyin Mendiola would like a refill of the following medications:     tramadol-acetaminophen 37.5-325 MG Oral Tab Eamon Evans MD]   Patient Comment: Will run out

## 2017-11-21 ENCOUNTER — OFFICE VISIT (OUTPATIENT)
Dept: PHYSICAL THERAPY | Facility: HOSPITAL | Age: 82
End: 2017-11-21
Attending: PHYSICAL MEDICINE & REHABILITATION
Payer: MEDICARE

## 2017-11-21 PROCEDURE — 97110 THERAPEUTIC EXERCISES: CPT

## 2017-11-21 NOTE — PROGRESS NOTES
Diagnosis: Chronic pain of both shoulders (M25.511,G89.29,M25.512)     Authorized # of Visits:  6/10        Next MD visit: none scheduled  Fall Risk: standard         Precautions: n/a           Medication Changes since last visit?: No  Subjective: Patient min  Total Treatment Time: 43 min

## 2017-11-28 ENCOUNTER — OFFICE VISIT (OUTPATIENT)
Dept: PHYSICAL THERAPY | Facility: HOSPITAL | Age: 82
End: 2017-11-28
Attending: PHYSICAL MEDICINE & REHABILITATION
Payer: MEDICARE

## 2017-11-28 PROCEDURE — 97110 THERAPEUTIC EXERCISES: CPT

## 2017-11-28 NOTE — PROGRESS NOTES
Diagnosis: Chronic pain of both shoulders (M25.511,G89.29,M25.512)     Authorized # of Visits:  7/10        Next MD visit: none scheduled  Fall Risk: standard         Precautions: n/a           Medication Changes since last visit?: No  Subjective: \"My al 43 min

## 2017-11-30 ENCOUNTER — APPOINTMENT (OUTPATIENT)
Dept: PHYSICAL THERAPY | Facility: HOSPITAL | Age: 82
End: 2017-11-30
Attending: PHYSICAL MEDICINE & REHABILITATION
Payer: MEDICARE

## 2017-12-04 ENCOUNTER — OFFICE VISIT (OUTPATIENT)
Dept: PHYSICAL THERAPY | Facility: HOSPITAL | Age: 82
End: 2017-12-04
Attending: PHYSICAL MEDICINE & REHABILITATION
Payer: MEDICARE

## 2017-12-04 PROCEDURE — 97110 THERAPEUTIC EXERCISES: CPT

## 2017-12-04 NOTE — PROGRESS NOTES
Diagnosis: Chronic pain of both shoulders (M25.511,G89.29,M25.512)     Authorized # of Visits:  8/10        Next MD visit: none scheduled  Fall Risk: standard         Precautions: n/a           Medication Changes since last visit?: No  Subjective: \"My roney Treatment Time: 43 min

## 2017-12-08 ENCOUNTER — OFFICE VISIT (OUTPATIENT)
Dept: PHYSICAL THERAPY | Facility: HOSPITAL | Age: 82
End: 2017-12-08
Attending: PHYSICAL MEDICINE & REHABILITATION
Payer: MEDICARE

## 2017-12-08 PROCEDURE — 97110 THERAPEUTIC EXERCISES: CPT

## 2017-12-08 NOTE — PROGRESS NOTES
Diagnosis: Chronic pain of both shoulders (M25.511,G89.29,M25.512)     Authorized # of Visits:  9/10        Next MD visit: none scheduled  Fall Risk: standard         Precautions: n/a           Medication Changes since last visit?: No  Subjective: Patient

## 2017-12-12 ENCOUNTER — OFFICE VISIT (OUTPATIENT)
Dept: PHYSICAL THERAPY | Facility: HOSPITAL | Age: 82
End: 2017-12-12
Attending: PHYSICAL MEDICINE & REHABILITATION
Payer: MEDICARE

## 2017-12-12 PROCEDURE — 97110 THERAPEUTIC EXERCISES: CPT

## 2017-12-12 NOTE — TELEPHONE ENCOUNTER
From: Arden Sommer  Sent: 12/12/2017 1:26 AM CST  Subject: Medication Renewal Request    Ella Santiago.  Alejandra Holding would like a refill of the following medications:     tramadol-acetaminophen 37.5-325 MG Oral Tab Corazon Balderas MD]    Preferred pharmacy: [facility]

## 2017-12-12 NOTE — TELEPHONE ENCOUNTER
Last refill #90 on 11/19/17   Last office visit 1/27/17    Refill Protocol Appointment Criteria  · Appointment scheduled in the past 6 months or in the next 3 months  Recent Outpatient Visits            Today     [address], [place]

## 2017-12-12 NOTE — PROGRESS NOTES
DISCHARGE SUMMARY  Diagnosis: Chronic pain of both shoulders (M25.511,G89.29,M25.512)     Authorized # of Visits:  10/10        Next MD visit: none scheduled  Fall Risk: standard         Precautions: n/a           Medication Changes since last visit?: No with HEP and plans to continue on her own upon discharge. Patient will be discharged from PT at this time. Goals:    1. Patient will be independent with HEP and it's progression - MET  2.  Patient will demo B shoulder AROM at least: flex 120, abd 100, IR

## 2017-12-28 ENCOUNTER — TELEPHONE (OUTPATIENT)
Dept: INTERNAL MEDICINE CLINIC | Facility: CLINIC | Age: 82
End: 2017-12-28

## 2018-01-02 NOTE — TELEPHONE ENCOUNTER
Medication request: Duloxetine HCI 30mg quat; 60 capsules 2 capsules daily    LOV 10-13-17  NOV none  Last refill: 11-17-17

## 2018-01-03 RX ORDER — DULOXETIN HYDROCHLORIDE 60 MG/1
60 CAPSULE, DELAYED RELEASE ORAL DAILY
Qty: 30 CAPSULE | Refills: 3 | Status: SHIPPED | OUTPATIENT
Start: 2018-01-03 | End: 2018-03-22

## 2018-01-03 RX ORDER — OMEPRAZOLE 20 MG/1
CAPSULE, DELAYED RELEASE ORAL
Qty: 90 CAPSULE | Refills: 0 | Status: SHIPPED | OUTPATIENT
Start: 2018-01-03 | End: 2018-02-13

## 2018-01-03 NOTE — TELEPHONE ENCOUNTER
Tramadol-acetaminophen refill information called into [facility] pharmacist Darlene. Left VM notifying pt prescription has been refilled and to call back if any questions.

## 2018-01-29 NOTE — TELEPHONE ENCOUNTER
Omari Stevens is calling to request refill on :      Current Outpatient Prescriptions: AmLODIPine Besylate 10 MG Oral Tab Take 1 tablet (10 mg total) by mouth daily.  Disp: 90 tablet Rfl: 3

## 2018-01-29 NOTE — TELEPHONE ENCOUNTER
Patient states pharmacy needs Dr. Patrick Pemberton on RX and they are waiting on this to refill. Patient is out and requesting this be completed today.

## 2018-01-30 NOTE — TELEPHONE ENCOUNTER
Patient calling back to check status of Tramadol rx, patient is now out of meds  Reason for call changed from refill request to acute

## 2018-01-30 NOTE — TELEPHONE ENCOUNTER
Dr. Darek Irby, please see pended medication and advise. LOV: 1/24/17  Last prescribed: 1/3/18 for 90 tabs/0 refills  Per pharmacy med isn't due for refill until 1/30/18.    Please advise and if possible authorize early refill, per pharmacy, pt is out of medic

## 2018-02-03 RX ORDER — AMLODIPINE BESYLATE 10 MG/1
10 TABLET ORAL DAILY
Qty: 90 TABLET | Refills: 3 | Status: SHIPPED | OUTPATIENT
Start: 2018-02-03 | End: 2019-01-12

## 2018-02-13 ENCOUNTER — HOSPITAL ENCOUNTER (OUTPATIENT)
Dept: GENERAL RADIOLOGY | Facility: HOSPITAL | Age: 83
Discharge: HOME | End: 2018-02-13
Attending: INTERNAL MEDICINE | Admitting: INTERNAL MEDICINE
Payer: MEDICARE

## 2018-02-13 ENCOUNTER — LAB ENCOUNTER (OUTPATIENT)
Dept: LAB | Age: 83
End: 2018-02-13
Attending: INTERNAL MEDICINE
Payer: MEDICARE

## 2018-02-13 ENCOUNTER — OFFICE VISIT (OUTPATIENT)
Dept: INTERNAL MEDICINE CLINIC | Facility: CLINIC | Age: 83
End: 2018-02-13

## 2018-02-13 VITALS
HEART RATE: 100 BPM | DIASTOLIC BLOOD PRESSURE: 82 MMHG | WEIGHT: 140 LBS | SYSTOLIC BLOOD PRESSURE: 155 MMHG | TEMPERATURE: 98 F | HEIGHT: 63 IN | BODY MASS INDEX: 24.8 KG/M2 | OXYGEN SATURATION: 97 %

## 2018-02-13 DIAGNOSIS — I10 ESSENTIAL HYPERTENSION: ICD-10-CM

## 2018-02-13 DIAGNOSIS — J15.3 PNEUMONIA OF LEFT LOWER LOBE DUE TO GROUP B STREPTOCOCCUS (HCC): Primary | ICD-10-CM

## 2018-02-13 DIAGNOSIS — J15.3 PNEUMONIA OF LEFT LOWER LOBE DUE TO GROUP B STREPTOCOCCUS (HCC): ICD-10-CM

## 2018-02-13 LAB
ALBUMIN SERPL BCP-MCNC: 3.5 G/DL (ref 3.5–4.8)
ALBUMIN/GLOB SERPL: 1 {RATIO} (ref 1–2)
ALP SERPL-CCNC: 81 U/L (ref 32–100)
ALT SERPL-CCNC: 17 U/L (ref 14–54)
ANION GAP SERPL CALC-SCNC: 11 MMOL/L (ref 0–18)
AST SERPL-CCNC: 25 U/L (ref 15–41)
BASOPHILS # BLD: 0.1 K/UL (ref 0–0.2)
BASOPHILS NFR BLD: 0 %
BILIRUB SERPL-MCNC: 0.9 MG/DL (ref 0.3–1.2)
BUN SERPL-MCNC: 11 MG/DL (ref 8–20)
BUN/CREAT SERPL: 12.8 (ref 10–20)
CALCIUM SERPL-MCNC: 9 MG/DL (ref 8.5–10.5)
CHLORIDE SERPL-SCNC: 102 MMOL/L (ref 95–110)
CO2 SERPL-SCNC: 27 MMOL/L (ref 22–32)
CREAT SERPL-MCNC: 0.86 MG/DL (ref 0.5–1.5)
EOSINOPHIL # BLD: 0 K/UL (ref 0–0.7)
EOSINOPHIL NFR BLD: 0 %
ERYTHROCYTE [DISTWIDTH] IN BLOOD BY AUTOMATED COUNT: 12.8 % (ref 11–15)
GLOBULIN PLAS-MCNC: 3.5 G/DL (ref 2.5–3.7)
GLUCOSE SERPL-MCNC: 201 MG/DL (ref 70–99)
HCT VFR BLD AUTO: 30.3 % (ref 35–48)
HGB BLD-MCNC: 10.2 G/DL (ref 12–16)
LYMPHOCYTES # BLD: 1.2 K/UL (ref 1–4)
LYMPHOCYTES NFR BLD: 7 %
MCH RBC QN AUTO: 31.1 PG (ref 27–32)
MCHC RBC AUTO-ENTMCNC: 33.6 G/DL (ref 32–37)
MCV RBC AUTO: 92.6 FL (ref 80–100)
MONOCYTES # BLD: 1 K/UL (ref 0–1)
MONOCYTES NFR BLD: 6 %
NEUTROPHILS # BLD AUTO: 14.5 K/UL (ref 1.8–7.7)
NEUTROPHILS NFR BLD: 86 %
OSMOLALITY UR CALC.SUM OF ELEC: 295 MOSM/KG (ref 275–295)
PATIENT FASTING: NO
PLATELET # BLD AUTO: 373 K/UL (ref 140–400)
PMV BLD AUTO: 7.8 FL (ref 7.4–10.3)
POTASSIUM SERPL-SCNC: 3.5 MMOL/L (ref 3.3–5.1)
PROT SERPL-MCNC: 7 G/DL (ref 5.9–8.4)
RBC # BLD AUTO: 3.28 M/UL (ref 3.7–5.4)
SODIUM SERPL-SCNC: 140 MMOL/L (ref 136–144)
TSH SERPL-ACNC: 0.8 UIU/ML (ref 0.45–5.33)
WBC # BLD AUTO: 16.8 K/UL (ref 4–11)

## 2018-02-13 PROCEDURE — 84443 ASSAY THYROID STIM HORMONE: CPT

## 2018-02-13 PROCEDURE — 85025 COMPLETE CBC W/AUTO DIFF WBC: CPT

## 2018-02-13 PROCEDURE — 80053 COMPREHEN METABOLIC PANEL: CPT

## 2018-02-13 PROCEDURE — 99214 OFFICE O/P EST MOD 30 MIN: CPT | Performed by: INTERNAL MEDICINE

## 2018-02-13 PROCEDURE — 71046 X-RAY EXAM CHEST 2 VIEWS: CPT | Performed by: INTERNAL MEDICINE

## 2018-02-13 PROCEDURE — 36415 COLL VENOUS BLD VENIPUNCTURE: CPT

## 2018-02-13 PROCEDURE — G0463 HOSPITAL OUTPT CLINIC VISIT: HCPCS | Performed by: INTERNAL MEDICINE

## 2018-02-13 RX ORDER — LEVOFLOXACIN 500 MG/1
500 TABLET, FILM COATED ORAL DAILY
Qty: 7 TABLET | Refills: 0 | Status: SHIPPED | OUTPATIENT
Start: 2018-02-13 | End: 2018-04-17

## 2018-02-13 NOTE — PATIENT INSTRUCTIONS
Viral Upper Respiratory Illness with Wheezing (Adult)  You have a viral upper respiratory illness (URI), which is another term for the common cold. When the infection causes a lot of irritation, the air passages can go into spasm.  This causes wheezing an Follow up with your healthcare provider, or as advised.   When to seek medical advice  Call your healthcare provider right away if any of these occur:  · Cough with lots of colored sputum (mucus)  · Severe headache; face, neck, or ear pain  · Difficulty swa

## 2018-02-13 NOTE — PROGRESS NOTES
Anthony Irving is a 80year old female.   Patient presents with:  URI      HPI:   Pt comes as an urgent visit   Comes with her daughter Elayne Libman  C/o cough x 3 weeks - clear mucous , no blood   Pt lives with her daughter and their family   Whole fami Garcia's esophagus determined by endoscopy    • Chronic pain of both shoulders 10/13/2017   • Degenerative disc disease, lumbar    • Diabetes ([facility] [address]     diet controlled   • Essential hypertension    • GERD (gastroesophageal reflux disease)    • Hiatal shila normocephalic,throat are clear ok   NECK: supple,no adenopathy, non tender   LUNGS:  Crackles LL bases b/l - L>R   CARDIO: Reg rhythm, tachy       ASSESSMENT AND PLAN:   Diagnoses and all orders for this visit:    Pneumonia of left lower lobe due to group

## 2018-02-14 ENCOUNTER — TELEPHONE (OUTPATIENT)
Dept: OTHER | Age: 83
End: 2018-02-14

## 2018-02-14 DIAGNOSIS — R05.9 COUGH: Primary | ICD-10-CM

## 2018-02-14 RX ORDER — BENZONATATE 100 MG/1
100 CAPSULE ORAL 2 TIMES DAILY PRN
Qty: 10 CAPSULE | Refills: 0 | Status: SHIPPED | OUTPATIENT
Start: 2018-02-14 | End: 2018-04-17

## 2018-02-14 NOTE — TELEPHONE ENCOUNTER
Notes Recorded by Antonio Chinchilla MD on 2/14/2018 at 9:17 AM CST  Called patient and notified test results. --left msg -- pt already on antibiotics , if not better call back but if any severe sympt like sob cp etc go to ER

## 2018-02-14 NOTE — TELEPHONE ENCOUNTER
Pt's daughter asking for a cough medicine. Pt does not sleep at night due to cough and Robitussin DM not helping. Pt will start on antibiotic today. Please advise  Please reply to pool: HERACLIO Miranda.

## 2018-02-14 NOTE — TELEPHONE ENCOUNTER
Pt returned the call. She began the antibiotics this am, was unable to pick them up until today. Pt has a harsh, frequent cough but is able to speak in full sentences, no apparent wheezing or distress.  Pt was advised to go to ER for sob, cp, fever, sputum

## 2018-02-28 ENCOUNTER — PATIENT MESSAGE (OUTPATIENT)
Dept: INTERNAL MEDICINE CLINIC | Facility: CLINIC | Age: 83
End: 2018-02-28

## 2018-02-28 NOTE — TELEPHONE ENCOUNTER
Please advise on refill requests.      Hypertensive Medications  Protocol Criteria:  · Appointment scheduled in the past 6 months or in the next 3 months  · BMP or CMP in the past 12 months  · Creatinine result < 2  Recent Outpatient Visits            2 wee

## 2018-02-28 NOTE — TELEPHONE ENCOUNTER
From: Asia Garibay  Sent: 2/27/2018 6:34 PM CST  Subject: Medication Renewal Request    Johnathan Licoan.  Yassine Reyes would like a refill of the following medications:     METOPROLOL TARTRATE 50 MG Oral Tab Julian Benton MD]     TRAMADOL-ACETAMINOPHEN 37.5-325 MG Ora

## 2018-03-01 RX ORDER — METOPROLOL TARTRATE 50 MG/1
TABLET, FILM COATED ORAL
Qty: 180 TABLET | Refills: 1 | Status: SHIPPED | OUTPATIENT
Start: 2018-03-01 | End: 2018-09-11

## 2018-03-01 NOTE — TELEPHONE ENCOUNTER
From: Aron Brooke  To: Arlina Baumgarten, MD  Sent: 2/28/2018 8:50 PM CST  Subject: Prescription Question     Made request for med refill for TRAMADOL Yesterday. I will run out by This Friday so I need.  Your approval.as soon as possible  Thanks   [address]

## 2018-03-01 NOTE — TELEPHONE ENCOUNTER
See 2/27/18 telephone encounter.      Pharmacy   [place] [id number] [facility], [place] - [address], [phone], [phone]   Medication Detail    Disp Refills Start End    tramadol-acetaminophen 37.5-

## 2018-03-23 RX ORDER — BENAZEPRIL HYDROCHLORIDE 40 MG/1
40 TABLET, FILM COATED ORAL DAILY
Qty: 90 TABLET | Refills: 0 | Status: SHIPPED
Start: 2018-03-23 | End: 2018-06-15

## 2018-03-23 RX ORDER — DULOXETIN HYDROCHLORIDE 60 MG/1
60 CAPSULE, DELAYED RELEASE ORAL DAILY
Qty: 30 CAPSULE | Refills: 3 | Status: SHIPPED | OUTPATIENT
Start: 2018-03-23 | End: 2018-07-19

## 2018-03-23 RX ORDER — OMEPRAZOLE 20 MG/1
CAPSULE, DELAYED RELEASE ORAL
Qty: 90 CAPSULE | Refills: 0 | Status: SHIPPED | OUTPATIENT
Start: 2018-03-23 | End: 2018-07-19

## 2018-03-23 NOTE — TELEPHONE ENCOUNTER
From: Arthur Koyanagi  Sent: 3/22/2018 11:03 PM CDT  Subject: Medication Renewal Request    Camille Ash. Olean Lanes would like a refill of the following medications:     DULoxetine HCl 60 MG Oral Cap DR Sonido Hope, DO]    Preferred pharmacy: [facility]

## 2018-03-23 NOTE — TELEPHONE ENCOUNTER
Please advise on refill request. For Dr Vasiliy Clinton    Please respond to pool: EM [facility] LPN/CMA    Refill Protocol Appointment Criteria  · Appointment scheduled in the past 6 months or in the next 3 months  Recent Outpatient Visits            1 month ago Pierre

## 2018-03-23 NOTE — TELEPHONE ENCOUNTER
Medications refilled for 90 days per protocol.         Hypertensive Medications Protocol Passed3/23 9:46 AM   CMP or BMP in past 12 months    Serum Creatinine < 2.0    Appointment in past 6 or next 3 months       Gastrointestional Medication Protocol Passed

## 2018-03-23 NOTE — TELEPHONE ENCOUNTER
Patient calling stating tramadol-acetaminophen 37.5-325 MG Oral Tab not called into pharmacy.       Tramadol-acetaminophen 37.5-325 MG Oral Tab called into [facility] ( pharmacist) at [facility],

## 2018-03-23 NOTE — TELEPHONE ENCOUNTER
Refill request for duloxetine 60 mg, take 1 cap daily, #30, 3 refills    LOV: 10/13/17  NOV: none  Last refilled on 1/3/18 with 3 refills

## 2018-04-17 ENCOUNTER — OFFICE VISIT (OUTPATIENT)
Dept: INTERNAL MEDICINE CLINIC | Facility: CLINIC | Age: 83
End: 2018-04-17

## 2018-04-17 VITALS
TEMPERATURE: 98 F | WEIGHT: 141 LBS | HEART RATE: 62 BPM | SYSTOLIC BLOOD PRESSURE: 144 MMHG | BODY MASS INDEX: 24.98 KG/M2 | DIASTOLIC BLOOD PRESSURE: 66 MMHG | HEIGHT: 63 IN

## 2018-04-17 DIAGNOSIS — D64.9 ANEMIA, UNSPECIFIED TYPE: ICD-10-CM

## 2018-04-17 DIAGNOSIS — E11.9 TYPE 2 DIABETES MELLITUS WITHOUT COMPLICATION, WITHOUT LONG-TERM CURRENT USE OF INSULIN (HCC): ICD-10-CM

## 2018-04-17 DIAGNOSIS — I10 ESSENTIAL HYPERTENSION: Primary | ICD-10-CM

## 2018-04-17 DIAGNOSIS — E55.9 VITAMIN D DEFICIENCY: ICD-10-CM

## 2018-04-17 DIAGNOSIS — E78.5 HYPERLIPIDEMIA, UNSPECIFIED HYPERLIPIDEMIA TYPE: ICD-10-CM

## 2018-04-17 DIAGNOSIS — E53.8 VITAMIN B12 DEFICIENCY: ICD-10-CM

## 2018-04-17 PROBLEM — R55 SYNCOPE AND COLLAPSE: Status: RESOLVED | Noted: 2017-05-27 | Resolved: 2018-04-17

## 2018-04-17 PROBLEM — R79.89 AZOTEMIA: Status: RESOLVED | Noted: 2017-05-27 | Resolved: 2018-04-17

## 2018-04-17 PROBLEM — E87.6 HYPOKALEMIA: Status: RESOLVED | Noted: 2017-05-27 | Resolved: 2018-04-17

## 2018-04-17 PROCEDURE — 99214 OFFICE O/P EST MOD 30 MIN: CPT | Performed by: INTERNAL MEDICINE

## 2018-04-17 PROCEDURE — 99212 OFFICE O/P EST SF 10 MIN: CPT | Performed by: INTERNAL MEDICINE

## 2018-04-19 ENCOUNTER — PATIENT OUTREACH (OUTPATIENT)
Dept: CASE MANAGEMENT | Age: 83
End: 2018-04-19

## 2018-04-19 NOTE — PROGRESS NOTES
Outreached to patient in regards to enrollment to Chronic Care Management program. Left message for patient to return my call at ext. [phone]. Thank you.

## 2018-04-28 PROBLEM — R73.9 HYPERGLYCEMIA: Status: RESOLVED | Noted: 2017-05-27 | Resolved: 2018-04-28

## 2018-04-28 RX ORDER — CELECOXIB 200 MG/1
CAPSULE ORAL
Qty: 90 CAPSULE | Refills: 0 | Status: SHIPPED | OUTPATIENT
Start: 2018-04-28 | End: 2018-07-19

## 2018-04-29 NOTE — PROGRESS NOTES
HPI:    Patient ID: Marc Parry is a 80year old female.     HPI    HTN  Long standing history of hypertension     sympotms  :        Headache no  dizziness        no                             Blurred vision no  palpitaionsSyncope no  Chest pain  no  PN problems. Current Outpatient Prescriptions:  tramadol-acetaminophen 37.5-325 MG Oral Tab TAKE 1 TABLET BY MOUTH EVERY 8 HOURS Disp: 90 tablet Rfl: 0   Benazepril HCl 40 MG Oral Tab Take 1 tablet (40 mg total) by mouth daily.  Disp: 90 tablet Rfl: them                intermittently.   1997: HERNIA SURGERY      Comment: umbilical hernia   1699: HYSTERECTOMY      Comment: total  1997: KNEE ARTHROSCOPY      Comment: torn cartilage  No date: OTHER SURGICAL HISTORY      Comment: ileum resected bc of radia Nursing note and vitals reviewed.            ASSESSMENT/PLAN:   (I10) Essential hypertension  (primary encounter diagnosis)  Plan: URINE MICROSCOPIC W REFLEX CULTURE        /66 (BP Location: Left arm, Patient Position: Sitting, Cuff Size: adult)   P

## 2018-05-18 NOTE — TELEPHONE ENCOUNTER
LOV &LR: 4/17/18 script pended. Please advise.     Please respond to pool: EM [facility] LPN/CMA

## 2018-06-04 ENCOUNTER — TELEPHONE (OUTPATIENT)
Dept: OTHER | Age: 83
End: 2018-06-04

## 2018-06-04 DIAGNOSIS — Z12.39 SCREENING FOR BREAST CANCER: Primary | ICD-10-CM

## 2018-06-04 NOTE — TELEPHONE ENCOUNTER
Pt requesting mammogram order, states order given to her last year has . Order pended for review.      Please respond to pool: EM [facility] LPN/CMA

## 2018-06-07 NOTE — TELEPHONE ENCOUNTER
Called Pt to inform mammogram referral has been approved by Dr Ann Marie Kim no answer left VM notifying . Referral mailed out to Pt .

## 2018-06-15 RX ORDER — BENAZEPRIL HYDROCHLORIDE 40 MG/1
TABLET, FILM COATED ORAL
Qty: 90 TABLET | Refills: 3 | Status: ON HOLD | OUTPATIENT
Start: 2018-06-15 | End: 2019-02-18

## 2018-06-20 NOTE — TELEPHONE ENCOUNTER
LOV: 4-17-18 Last Rx: 5-21-18     No protocol     Please advise in regards to refill request. Thank You      Please respond to pool: HREACLIO [facility] LPN/CMA

## 2018-07-12 ENCOUNTER — HOSPITAL ENCOUNTER (OUTPATIENT)
Dept: GENERAL RADIOLOGY | Facility: HOSPITAL | Age: 83
Discharge: HOME | End: 2018-07-12
Attending: PHYSICAL MEDICINE & REHABILITATION
Payer: MEDICARE

## 2018-07-12 ENCOUNTER — TELEPHONE (OUTPATIENT)
Dept: NEUROLOGY | Facility: CLINIC | Age: 83
End: 2018-07-12

## 2018-07-12 ENCOUNTER — OFFICE VISIT (OUTPATIENT)
Dept: NEUROLOGY | Facility: CLINIC | Age: 83
End: 2018-07-12

## 2018-07-12 VITALS
BODY MASS INDEX: 24.98 KG/M2 | HEART RATE: 73 BPM | HEIGHT: 63 IN | WEIGHT: 141 LBS | SYSTOLIC BLOOD PRESSURE: 124 MMHG | DIASTOLIC BLOOD PRESSURE: 54 MMHG | RESPIRATION RATE: 16 BRPM

## 2018-07-12 DIAGNOSIS — G89.29 BILATERAL CHRONIC KNEE PAIN: Primary | ICD-10-CM

## 2018-07-12 DIAGNOSIS — M25.561 BILATERAL CHRONIC KNEE PAIN: ICD-10-CM

## 2018-07-12 DIAGNOSIS — M25.561 BILATERAL CHRONIC KNEE PAIN: Primary | ICD-10-CM

## 2018-07-12 DIAGNOSIS — G89.29 BILATERAL CHRONIC KNEE PAIN: ICD-10-CM

## 2018-07-12 DIAGNOSIS — M25.562 BILATERAL CHRONIC KNEE PAIN: Primary | ICD-10-CM

## 2018-07-12 DIAGNOSIS — M25.562 BILATERAL CHRONIC KNEE PAIN: ICD-10-CM

## 2018-07-12 DIAGNOSIS — M17.0 BILATERAL PRIMARY OSTEOARTHRITIS OF KNEE: ICD-10-CM

## 2018-07-12 PROCEDURE — 73562 X-RAY EXAM OF KNEE 3: CPT | Performed by: PHYSICAL MEDICINE & REHABILITATION

## 2018-07-12 PROCEDURE — 20611 DRAIN/INJ JOINT/BURSA W/US: CPT | Performed by: PHYSICAL MEDICINE & REHABILITATION

## 2018-07-12 PROCEDURE — 99213 OFFICE O/P EST LOW 20 MIN: CPT | Performed by: PHYSICAL MEDICINE & REHABILITATION

## 2018-07-12 RX ORDER — LIDOCAINE HYDROCHLORIDE 10 MG/ML
2 INJECTION, SOLUTION INFILTRATION; PERINEURAL ONCE
Status: COMPLETED | OUTPATIENT
Start: 2018-07-12 | End: 2018-07-12

## 2018-07-12 RX ORDER — TRIAMCINOLONE ACETONIDE 40 MG/ML
40 INJECTION, SUSPENSION INTRA-ARTICULAR; INTRAMUSCULAR ONCE
Status: COMPLETED | OUTPATIENT
Start: 2018-07-12 | End: 2018-07-12

## 2018-07-12 RX ADMIN — TRIAMCINOLONE ACETONIDE 40 MG: 40 INJECTION, SUSPENSION INTRA-ARTICULAR; INTRAMUSCULAR at 14:52:00

## 2018-07-12 NOTE — PROGRESS NOTES
HPI:    Patient ID: Kandy Sr is a 80year old female. She was initially seen by me on 10/13/2017 due to bilateral shoulder pain attributed to osteoarthritis. Ordered physical therapy and XR of the bilateral shoulders; XRs were not done.  She was al MG Oral Tab TAKE 1 TABLET(50 MG) BY MOUTH TWICE DAILY Disp: 180 tablet Rfl: 1   AmLODIPine Besylate 10 MG Oral Tab Take 1 tablet (10 mg total) by mouth daily.  Disp: 90 tablet Rfl: 3   Loperamide HCl 2 MG Oral Cap Take 2 mg by mouth 4 (four) times daily as chronic knee pain    Recommend WB views of both knees for further evaluation; will do bilateral CSI under ultrasound guidance. She may continue her current medication regimen.     Bilateral knee joint injection:  Dx: bilateral knee osteoarthritis, severe  T

## 2018-07-12 NOTE — TELEPHONE ENCOUNTER
Called [facility], for authorization of bilat knee joint injection. CPT code/s 56082-00, U963705, . T/t Lazarus Duff who states no authorization is required. Reference # [id number]    Will inform nursing.

## 2018-07-16 ENCOUNTER — HOSPITAL ENCOUNTER (OUTPATIENT)
Dept: MAMMOGRAPHY | Facility: HOSPITAL | Age: 83
Discharge: HOME | End: 2018-07-16
Attending: INTERNAL MEDICINE
Payer: MEDICARE

## 2018-07-16 DIAGNOSIS — Z12.39 SCREENING FOR BREAST CANCER: ICD-10-CM

## 2018-07-16 PROCEDURE — 77067 SCR MAMMO BI INCL CAD: CPT | Performed by: INTERNAL MEDICINE

## 2018-07-17 ENCOUNTER — MED REC SCAN ONLY (OUTPATIENT)
Dept: NEUROLOGY | Facility: CLINIC | Age: 83
End: 2018-07-17

## 2018-07-20 RX ORDER — DULOXETIN HYDROCHLORIDE 60 MG/1
CAPSULE, DELAYED RELEASE ORAL
Qty: 30 CAPSULE | Refills: 3 | Status: SHIPPED | OUTPATIENT
Start: 2018-07-20 | End: 2018-11-09

## 2018-07-20 NOTE — TELEPHONE ENCOUNTER
Refill request for duloxetine 60 mg, take 1 cap daily, #30, 3 refill    LOV: 7/12/18  NOV: none  Last refilled on 3/23/18 with 3 refills

## 2018-07-21 RX ORDER — CELECOXIB 200 MG/1
CAPSULE ORAL
Qty: 90 CAPSULE | Refills: 0 | Status: SHIPPED | OUTPATIENT
Start: 2018-07-21 | End: 2018-10-19

## 2018-07-21 RX ORDER — OMEPRAZOLE 20 MG/1
CAPSULE, DELAYED RELEASE ORAL
Qty: 90 CAPSULE | Refills: 0 | Status: SHIPPED | OUTPATIENT
Start: 2018-07-21 | End: 2019-03-04

## 2018-08-14 ENCOUNTER — HOSPITAL ENCOUNTER (OUTPATIENT)
Dept: ULTRASOUND IMAGING | Facility: HOSPITAL | Age: 83
Discharge: HOME | End: 2018-08-14
Attending: INTERNAL MEDICINE
Payer: MEDICARE

## 2018-08-14 ENCOUNTER — HOSPITAL ENCOUNTER (OUTPATIENT)
Dept: MAMMOGRAPHY | Facility: HOSPITAL | Age: 83
Discharge: HOME | End: 2018-08-14
Attending: INTERNAL MEDICINE
Payer: MEDICARE

## 2018-08-14 DIAGNOSIS — R92.8 ABNORMAL MAMMOGRAM: ICD-10-CM

## 2018-08-14 PROCEDURE — 76642 ULTRASOUND BREAST LIMITED: CPT | Performed by: INTERNAL MEDICINE

## 2018-08-14 PROCEDURE — 77065 DX MAMMO INCL CAD UNI: CPT | Performed by: INTERNAL MEDICINE

## 2018-08-14 PROCEDURE — 77061 BREAST TOMOSYNTHESIS UNI: CPT | Performed by: INTERNAL MEDICINE

## 2018-08-15 ENCOUNTER — TELEPHONE (OUTPATIENT)
Dept: INTERNAL MEDICINE CLINIC | Facility: CLINIC | Age: 83
End: 2018-08-15

## 2018-08-15 DIAGNOSIS — R92.8 FOLLOW-UP EXAMINATION OF ABNORMAL MAMMOGRAM: Primary | ICD-10-CM

## 2018-08-15 NOTE — TELEPHONE ENCOUNTER
Follow up mammogram and US Breast- Left orders placed as written below per Dr. Brad Brooks. Routed to [facility], may need prior authorization test to be done 2/2019.

## 2018-08-15 NOTE — TELEPHONE ENCOUNTER
Notes recorded by El Bella MD on 8/15/2018 at 7:15 AM CDT  Mammogram probably benign:  Send her an order for mammogram repeat in 6 mo  Order will be based on the result recommendation  Send her a letter and copy of result

## 2018-09-11 RX ORDER — METOPROLOL TARTRATE 50 MG/1
TABLET, FILM COATED ORAL
Qty: 180 TABLET | Refills: 0 | Status: SHIPPED | OUTPATIENT
Start: 2018-09-11 | End: 2019-02-20

## 2018-10-03 NOTE — TELEPHONE ENCOUNTER
Spoke with patient. Requesting refill. Has three pills left.      Last refill 7/22/18    Last office visit 4/17/18    Please respond to pool: EM [facility] LPN/CMA    Office staff please in RX once approved

## 2018-10-05 NOTE — TELEPHONE ENCOUNTER
Dr. Usha Echevarria: please advise; patient calling again. Seeking refill for tramadol/acetaminophen. Patient took her last pill today. Needs pain reliever for her arthritis.

## 2018-10-11 ENCOUNTER — OFFICE VISIT (OUTPATIENT)
Dept: NEUROLOGY | Facility: CLINIC | Age: 83
End: 2018-10-11
Payer: MEDICARE

## 2018-10-11 VITALS
HEART RATE: 66 BPM | SYSTOLIC BLOOD PRESSURE: 114 MMHG | DIASTOLIC BLOOD PRESSURE: 56 MMHG | BODY MASS INDEX: 22.75 KG/M2 | WEIGHT: 130 LBS | HEIGHT: 63.5 IN

## 2018-10-11 DIAGNOSIS — M54.10 RADICULAR PAIN OF LEFT LOWER EXTREMITY: Primary | ICD-10-CM

## 2018-10-11 PROCEDURE — 99213 OFFICE O/P EST LOW 20 MIN: CPT | Performed by: PHYSICAL MEDICINE & REHABILITATION

## 2018-10-11 RX ORDER — METHYLPREDNISOLONE 4 MG/1
TABLET ORAL
Qty: 1 PACKAGE | Refills: 0 | Status: ON HOLD | OUTPATIENT
Start: 2018-10-11 | End: 2019-02-12

## 2018-10-11 NOTE — PATIENT INSTRUCTIONS
1) Start the medrol dosepak tomorrow. Stop the celebrex while you are taking the medrol dosepak. You may resume the celebrex when you complete the medrol dosepak. You may continue the tramadol along with the medrol dosepak.     2) XR of the lumbar spine ord

## 2018-10-11 NOTE — PROGRESS NOTES
HPI:    Patient ID: Shelton Olszewski is a 80year old female. 80-year-old female presents for follow-up.   Yesterday she began to experience severe pain in the left medial knee that radiates up the medial thigh and lower leg to the foot with occasional numb Loperamide HCl 2 MG Oral Cap Take 2 mg by mouth 4 (four) times daily as needed. Disp:  Rfl:    Glutamine 500 MG Oral Cap Take 2 tablets by mouth 2 (two) times daily.    Disp:  Rfl:    Multiple Vitamins-Minerals (MULTI VITAMIN/MINERALS) Oral Tab Take by mo

## 2018-10-16 ENCOUNTER — HOSPITAL ENCOUNTER (OUTPATIENT)
Dept: GENERAL RADIOLOGY | Facility: HOSPITAL | Age: 83
Discharge: HOME | End: 2018-10-16
Attending: PHYSICAL MEDICINE & REHABILITATION
Payer: MEDICARE

## 2018-10-16 ENCOUNTER — TELEPHONE (OUTPATIENT)
Dept: NEUROLOGY | Facility: CLINIC | Age: 83
End: 2018-10-16

## 2018-10-16 DIAGNOSIS — M54.10 RADICULAR PAIN OF LEFT LOWER EXTREMITY: ICD-10-CM

## 2018-10-16 DIAGNOSIS — M54.10 RADICULAR SYNDROME OF LEFT LEG: Primary | ICD-10-CM

## 2018-10-16 PROCEDURE — 72110 X-RAY EXAM L-2 SPINE 4/>VWS: CPT | Performed by: PHYSICAL MEDICINE & REHABILITATION

## 2018-10-16 NOTE — TELEPHONE ENCOUNTER
----- Message from Xavier Flores DO sent at 10/16/2018  5:55 PM CDT -----  Please let the patient know that she has quite a bit of arthritis in the lower back.  If she continues to have pain in the lower back despite the medrol dosepak we should get her st

## 2018-10-17 NOTE — TELEPHONE ENCOUNTER
Left detailed message on cell (FYI verified) informing pt that order for PT has been placed. Pt was told to call office with facility she plans to go to so we can fax the order appropriately, or confirm that she would going to [facility] for PT.  Off

## 2018-10-20 RX ORDER — CELECOXIB 200 MG/1
CAPSULE ORAL
Qty: 90 CAPSULE | Refills: 0 | Status: SHIPPED | OUTPATIENT
Start: 2018-10-20 | End: 2019-01-28

## 2018-11-09 RX ORDER — DULOXETIN HYDROCHLORIDE 60 MG/1
CAPSULE, DELAYED RELEASE ORAL
Qty: 30 CAPSULE | Refills: 0 | Status: SHIPPED | OUTPATIENT
Start: 2018-11-09 | End: 2019-03-08

## 2018-11-09 NOTE — TELEPHONE ENCOUNTER
Rx for Duloxetine Hcl 60 mg take one capsule by mouth daily #30 no refill signed order and called to Pharmacy.  Spoke to pharmacist Claudia Luther    Pt was Rx medrol harris 10/11/2018 and was instructed by Dr Sandra Coats to hold the Cymbalta when she was taking the stero

## 2018-11-09 NOTE — TELEPHONE ENCOUNTER
Medication request: Duloxetine 60mg. Take 1 capsule daily. #30. No refills.     LOV-10/11/2018  NOV-none    /Last refill:7/20/2018 with 3 refills

## 2018-12-18 RX ORDER — OMEPRAZOLE 20 MG/1
CAPSULE, DELAYED RELEASE ORAL
Qty: 90 CAPSULE | Refills: 0 | Status: ON HOLD | OUTPATIENT
Start: 2018-12-18 | End: 2019-02-12

## 2018-12-18 RX ORDER — OMEPRAZOLE 20 MG/1
CAPSULE, DELAYED RELEASE ORAL
Qty: 90 CAPSULE | Refills: 0 | OUTPATIENT
Start: 2018-12-18

## 2018-12-29 NOTE — TELEPHONE ENCOUNTER
No Protocol on this med. Requested Prescriptions     Pending Prescriptions Disp Refills   • TRAMADOL-ACETAMINOPHEN 37.5-325 MG Oral Tab [Pharmacy Med Name: TRAMADOL/APAP 37. 5MG/325MG TABS] 90 tablet 0     Sig: TAKE 1 TABLET BY MOUTH EVERY 8 HOURS

## 2019-01-13 NOTE — TELEPHONE ENCOUNTER
Hypertensive Medications  Protocol Criteria:  · Appointment scheduled in the past 6 months or in the next 3 months  · BMP or CMP in the past 12 months  · Creatinine result < 2  Recent Outpatient Visits            3 months ago Radicular pain of left lower e

## 2019-01-14 RX ORDER — AMLODIPINE BESYLATE 10 MG/1
TABLET ORAL
Qty: 90 TABLET | Refills: 0 | Status: SHIPPED | OUTPATIENT
Start: 2019-01-14 | End: 2019-03-08

## 2019-01-29 RX ORDER — CELECOXIB 200 MG/1
CAPSULE ORAL
Qty: 90 CAPSULE | Refills: 0 | Status: SHIPPED | OUTPATIENT
Start: 2019-01-29 | End: 2019-03-04

## 2019-01-29 NOTE — TELEPHONE ENCOUNTER
Controlled medication pending for review. If approved needs to be called in or faxed by on-site staff.     Last Rx: 12-30-18  LOV: 4-17-18

## 2019-01-29 NOTE — TELEPHONE ENCOUNTER
Pharmacy contacted and spoke with Liseth Alves. Called in Tramadol-Acetaminophenb 37.5-325mg and Celecoxib 200 mg.

## 2019-02-11 ENCOUNTER — HOSPITAL ENCOUNTER (INPATIENT)
Facility: HOSPITAL | Age: 84
LOS: 6 days | Discharge: HOME HEALTH | DRG: 388 | End: 2019-02-18
Attending: EMERGENCY MEDICINE | Admitting: HOSPITALIST
Payer: MEDICARE

## 2019-02-11 DIAGNOSIS — K20.90 ESOPHAGITIS: ICD-10-CM

## 2019-02-11 DIAGNOSIS — K31.9 GASTROPATHY: ICD-10-CM

## 2019-02-11 DIAGNOSIS — K92.2 GASTROINTESTINAL HEMORRHAGE, UNSPECIFIED GASTROINTESTINAL HEMORRHAGE TYPE: ICD-10-CM

## 2019-02-11 DIAGNOSIS — K44.9 HIATAL HERNIA: ICD-10-CM

## 2019-02-11 DIAGNOSIS — E86.0 DEHYDRATION: Primary | ICD-10-CM

## 2019-02-11 DIAGNOSIS — K56.609 SMALL BOWEL OBSTRUCTION (HCC): ICD-10-CM

## 2019-02-11 DIAGNOSIS — K25.9 GASTRIC ULCER: ICD-10-CM

## 2019-02-11 DIAGNOSIS — N28.9 RENAL INSUFFICIENCY: ICD-10-CM

## 2019-02-12 ENCOUNTER — APPOINTMENT (OUTPATIENT)
Dept: GENERAL RADIOLOGY | Facility: HOSPITAL | Age: 84
DRG: 388 | End: 2019-02-12
Attending: EMERGENCY MEDICINE
Payer: MEDICARE

## 2019-02-12 ENCOUNTER — APPOINTMENT (OUTPATIENT)
Dept: CT IMAGING | Facility: HOSPITAL | Age: 84
DRG: 388 | End: 2019-02-12
Attending: EMERGENCY MEDICINE
Payer: MEDICARE

## 2019-02-12 PROBLEM — N28.9 RENAL INSUFFICIENCY: Status: ACTIVE | Noted: 2019-02-12

## 2019-02-12 PROBLEM — K56.609 SBO (SMALL BOWEL OBSTRUCTION) (HCC): Status: ACTIVE | Noted: 2019-02-12

## 2019-02-12 PROBLEM — K56.609 SMALL BOWEL OBSTRUCTION (HCC): Status: ACTIVE | Noted: 2019-02-12

## 2019-02-12 PROBLEM — E86.0 DEHYDRATION: Status: ACTIVE | Noted: 2019-02-12

## 2019-02-12 LAB
ALBUMIN SERPL BCP-MCNC: 3.7 G/DL (ref 3.5–4.8)
ALP SERPL-CCNC: 79 U/L (ref 32–100)
ALT SERPL-CCNC: 20 U/L (ref 14–54)
AMYLASE SERPL-CCNC: 39 U/L (ref 24–108)
ANION GAP SERPL CALC-SCNC: 25 MMOL/L (ref 0–18)
AST SERPL-CCNC: 41 U/L (ref 15–41)
BACTERIA UR QL AUTO: NEGATIVE /HPF
BASOPHILS # BLD AUTO: 0.01 X10(3) UL (ref 0–0.2)
BASOPHILS NFR BLD AUTO: 0.1 %
BILIRUB DIRECT SERPL-MCNC: 0.2 MG/DL (ref 0–0.2)
BILIRUB SERPL-MCNC: 1.3 MG/DL (ref 0.3–1.2)
BILIRUB UR QL: NEGATIVE
BUN SERPL-MCNC: 62 MG/DL (ref 8–20)
BUN/CREAT SERPL: 20.3 (ref 10–20)
CALCIUM SERPL-MCNC: 9.5 MG/DL (ref 8.5–10.5)
CHLORIDE SERPL-SCNC: 80 MMOL/L (ref 95–110)
CO2 SERPL-SCNC: 28 MMOL/L (ref 22–32)
COLOR UR: YELLOW
CREAT SERPL-MCNC: 3.05 MG/DL (ref 0.5–1.5)
DEPRECATED RDW RBC AUTO: 45.1 FL (ref 35.1–46.3)
EOSINOPHIL # BLD AUTO: 0.01 X10(3) UL (ref 0–0.7)
EOSINOPHIL NFR BLD AUTO: 0.1 %
ERYTHROCYTE [DISTWIDTH] IN BLOOD BY AUTOMATED COUNT: 13.2 % (ref 11–15)
EST. AVERAGE GLUCOSE BLD GHB EST-MCNC: 154 MG/DL (ref 68–126)
GLUCOSE BLDC GLUCOMTR-MCNC: 160 MG/DL (ref 70–99)
GLUCOSE BLDC GLUCOMTR-MCNC: 190 MG/DL (ref 70–99)
GLUCOSE BLDC GLUCOMTR-MCNC: 202 MG/DL (ref 70–99)
GLUCOSE SERPL-MCNC: 360 MG/DL (ref 70–99)
GLUCOSE UR-MCNC: 50 MG/DL
HBA1C MFR BLD HPLC: 7 % (ref ?–5.7)
HCT VFR BLD AUTO: 35.6 % (ref 35–48)
HGB BLD-MCNC: 12 G/DL (ref 12–16)
HGB UR QL STRIP.AUTO: NEGATIVE
HYALINE CASTS #/AREA URNS AUTO: 8 /LPF
IMM GRANULOCYTES # BLD AUTO: 0.03 X10(3) UL (ref 0–1)
IMM GRANULOCYTES NFR BLD: 0.3 %
LACTATE SERPL-SCNC: 1.1 MMOL/L (ref 0.5–2.2)
LACTATE SERPL-SCNC: 1.6 MMOL/L (ref 0.5–2.2)
LACTATE SERPL-SCNC: 3 MMOL/L (ref 0.5–2.2)
LEUKOCYTE ESTERASE UR QL STRIP.AUTO: NEGATIVE
LIPASE SERPL-CCNC: 30 U/L (ref 22–51)
LYMPHOCYTES # BLD AUTO: 1.03 X10(3) UL (ref 1–4)
LYMPHOCYTES NFR BLD AUTO: 10.1 %
MCH RBC QN AUTO: 31.4 PG (ref 26–34)
MCHC RBC AUTO-ENTMCNC: 33.7 G/DL (ref 31–37)
MCV RBC AUTO: 93.2 FL (ref 80–100)
MONOCYTES # BLD AUTO: 1.32 X10(3) UL (ref 0.1–1)
MONOCYTES NFR BLD AUTO: 13 %
NEUTROPHILS # BLD AUTO: 7.75 X10 (3) UL (ref 1.5–7.7)
NEUTROPHILS # BLD AUTO: 7.75 X10(3) UL (ref 1.5–7.7)
NEUTROPHILS NFR BLD AUTO: 76.4 %
NITRITE UR QL STRIP.AUTO: NEGATIVE
OSMOLALITY UR CALC.SUM OF ELEC: 308 MOSM/KG (ref 275–295)
PH UR: 5 [PH] (ref 5–8)
PLATELET # BLD AUTO: 321 10(3)UL (ref 150–450)
POTASSIUM SERPL-SCNC: 4 MMOL/L (ref 3.3–5.1)
PROT SERPL-MCNC: 7.4 G/DL (ref 5.9–8.4)
PROT UR-MCNC: NEGATIVE MG/DL
RBC # BLD AUTO: 3.82 X10(6)UL (ref 3.8–5.3)
RBC #/AREA URNS AUTO: 1 /HPF
SODIUM SERPL-SCNC: 133 MMOL/L (ref 136–144)
SP GR UR STRIP: 1.02 (ref 1–1.03)
UROBILINOGEN UR STRIP-ACNC: <2
VIT C UR-MCNC: NEGATIVE MG/DL
WBC # BLD AUTO: 10.2 X10(3) UL (ref 4–11)
WBC #/AREA URNS AUTO: 1 /HPF

## 2019-02-12 PROCEDURE — 99223 1ST HOSP IP/OBS HIGH 75: CPT | Performed by: SURGERY

## 2019-02-12 PROCEDURE — 71045 X-RAY EXAM CHEST 1 VIEW: CPT | Performed by: EMERGENCY MEDICINE

## 2019-02-12 PROCEDURE — 99223 1ST HOSP IP/OBS HIGH 75: CPT | Performed by: HOSPITALIST

## 2019-02-12 PROCEDURE — 74176 CT ABD & PELVIS W/O CONTRAST: CPT | Performed by: EMERGENCY MEDICINE

## 2019-02-12 RX ORDER — METOCLOPRAMIDE HYDROCHLORIDE 5 MG/ML
5 INJECTION INTRAMUSCULAR; INTRAVENOUS ONCE
Status: COMPLETED | OUTPATIENT
Start: 2019-02-12 | End: 2019-02-12

## 2019-02-12 RX ORDER — DEXTROSE AND SODIUM CHLORIDE 5; .9 G/100ML; G/100ML
INJECTION, SOLUTION INTRAVENOUS CONTINUOUS
Status: DISCONTINUED | OUTPATIENT
Start: 2019-02-12 | End: 2019-02-15

## 2019-02-12 RX ORDER — SODIUM CHLORIDE, SODIUM LACTATE, POTASSIUM CHLORIDE, CALCIUM CHLORIDE 600; 310; 30; 20 MG/100ML; MG/100ML; MG/100ML; MG/100ML
INJECTION, SOLUTION INTRAVENOUS ONCE
Status: COMPLETED | OUTPATIENT
Start: 2019-02-12 | End: 2019-02-12

## 2019-02-12 RX ORDER — HEPARIN SODIUM 5000 [USP'U]/ML
5000 INJECTION, SOLUTION INTRAVENOUS; SUBCUTANEOUS EVERY 12 HOURS SCHEDULED
Status: DISCONTINUED | OUTPATIENT
Start: 2019-02-12 | End: 2019-02-14

## 2019-02-12 RX ORDER — SODIUM CHLORIDE 0.9 % (FLUSH) 0.9 %
3 SYRINGE (ML) INJECTION AS NEEDED
Status: DISCONTINUED | OUTPATIENT
Start: 2019-02-12 | End: 2019-02-18

## 2019-02-12 RX ORDER — DEXTROSE MONOHYDRATE 25 G/50ML
50 INJECTION, SOLUTION INTRAVENOUS AS NEEDED
Status: DISCONTINUED | OUTPATIENT
Start: 2019-02-12 | End: 2019-02-18

## 2019-02-12 RX ORDER — MORPHINE SULFATE 2 MG/ML
1 INJECTION, SOLUTION INTRAMUSCULAR; INTRAVENOUS EVERY 4 HOURS PRN
Status: DISCONTINUED | OUTPATIENT
Start: 2019-02-12 | End: 2019-02-18

## 2019-02-12 RX ORDER — LIDOCAINE HYDROCHLORIDE 10 MG/ML
3 INJECTION, SOLUTION EPIDURAL; INFILTRATION; INTRACAUDAL; PERINEURAL ONCE
Status: COMPLETED | OUTPATIENT
Start: 2019-02-12 | End: 2019-02-12

## 2019-02-12 RX ORDER — SODIUM CHLORIDE 9 MG/ML
INJECTION, SOLUTION INTRAVENOUS CONTINUOUS
Status: DISCONTINUED | OUTPATIENT
Start: 2019-02-12 | End: 2019-02-12

## 2019-02-12 RX ORDER — ONDANSETRON 2 MG/ML
4 INJECTION INTRAMUSCULAR; INTRAVENOUS EVERY 6 HOURS PRN
Status: DISCONTINUED | OUTPATIENT
Start: 2019-02-12 | End: 2019-02-18

## 2019-02-12 RX ORDER — METOPROLOL TARTRATE 5 MG/5ML
5 INJECTION INTRAVENOUS EVERY 6 HOURS
Status: DISCONTINUED | OUTPATIENT
Start: 2019-02-12 | End: 2019-02-15

## 2019-02-12 RX ORDER — FAMOTIDINE 10 MG/ML
20 INJECTION, SOLUTION INTRAVENOUS ONCE
Status: COMPLETED | OUTPATIENT
Start: 2019-02-12 | End: 2019-02-12

## 2019-02-12 RX ORDER — ONDANSETRON 2 MG/ML
4 INJECTION INTRAMUSCULAR; INTRAVENOUS ONCE
Status: COMPLETED | OUTPATIENT
Start: 2019-02-12 | End: 2019-02-12

## 2019-02-12 NOTE — SEPSIS REASSESSMENT
[facility]      Sepsis Reassessment Note    /62   Pulse 104   Temp 97.6 °F (36.4 °C)   Resp 20   Ht 167.6 cm (5' 6\")   Wt 68 kg   SpO2 (!) 87%   BMI 24.21 kg/m²      4:22 AM    Cardiac:  Regularity: Regular  Rate: Normal  Heart Opal

## 2019-02-12 NOTE — CONSULTS
[facility]    PATIENT'S NAME: Yue Paula   ATTENDING PHYSICIAN: Roma Mckeon MD   CONSULTING PHYSICIAN: Yeimy Arteaga MD   PATIENT ACCOUNT#:   [id number]    LOCATION:  [address] [place] [address] #:   [id number]       DATE OF BIR benazepril, celecoxib, duloxetine, glutamine, loperamide, metoprolol, multivitamin, omeprazole, tramadol/acetaminophen. ALLERGIES:  None. SOCIAL HISTORY:  She is  and has 1 daughter. She lives with her daughter and 2 grandchildren.   She has a Scoliosis and degenerative spine changes are quite severe. Other findings include the following:  Probable hepatic cysts, bilateral probably benign adrenal gland nodularity, bilateral kidney cysts, atelectasis.     IMPRESSION:  An 41-year-old woman with a

## 2019-02-12 NOTE — ED PROVIDER NOTES
Patient Seen in: [facility] AND [facility] Emergency Department    History   Patient presents with:  Nausea/Vomiting/Diarrhea (gastrointestinal)    Stated Complaint: n/v    HPI    68-year-old female now with 2-3 days of intractable nausea vomiting and inability Comment: wine with dinner    Drug use: No      Review of Systems    Positive for stated complaint: n/v  Other systems are as noted in HPI. Constitutional and vital signs reviewed. All other systems reviewed and negative except as noted above.     Phys FUNCTION PANEL (7) - Abnormal; Notable for the following components:    Bilirubin, Total 1.3 (*)     All other components within normal limits   URINALYSIS WITH CULTURE REFLEX - Abnormal; Notable for the following components:    Clarity Urine Cloudy (*) liver lesions are likely cysts or hemangiomas but not characterized; probable atelectasis or scarring in the bases; cardiomegaly; coronary calcification; multiple low-attenuation renal lesions are likely cysts but not definitively evaluated; no perforation ICD-10-CM Noted POA    Dehydration E86.0 2/12/2019 Unknown

## 2019-02-12 NOTE — H&P
[address] Patient Status:  Inpatient    10/25/1932 MRN [id number]   Location [facility] 4W/SW/SE Attending Tisha Berger MD   Hosp Day # 0 PCP Marlena Wade MD     Date:  2019 egd then but needs them intermittently.    • HERNIA SURGERY  0664    umbilical hernia    • HYSTERECTOMY  1995    total   • KNEE ARTHROSCOPY  1997    torn cartilage   • OTHER SURGICAL HISTORY      ileum resected bc of radiation damage   • RADIATION THERAPY M rate 18, height 5' 6\" (1.676 m), weight 150 lb (68 kg), SpO2 95 %, not currently breastfeeding. General:  Alert and oriented. Conversant, NG tube in, draining  Diffuse skin problem:  None.   Eye:  Pupils are equal, round and reactive to light, extraoc esophagitis. Fluid in the distal esophagus which likely reflects reflux. 4. Left lower lobe atelectasis, pneumonia and/or scarring. 5. Renal cysts and probable hepatic cysts. 6. Generalized atherosclerotic vascular calcification.  7. Small rony-incisional f midnight's based on the clinical documentation in H+P. Based on patients current state of illness, I anticipate that, after discharge, patient will require TBD.

## 2019-02-13 ENCOUNTER — APPOINTMENT (OUTPATIENT)
Dept: GENERAL RADIOLOGY | Facility: HOSPITAL | Age: 84
DRG: 388 | End: 2019-02-13
Attending: SURGERY
Payer: MEDICARE

## 2019-02-13 LAB
ALBUMIN SERPL-MCNC: 2.5 G/DL (ref 3.4–5)
ANION GAP SERPL CALC-SCNC: 5 MMOL/L (ref 0–18)
BUN BLD-MCNC: 37 MG/DL (ref 7–18)
BUN/CREAT SERPL: 33.6 (ref 10–20)
CALCIUM BLD-MCNC: 7.8 MG/DL (ref 8.5–10.1)
CHLORIDE SERPL-SCNC: 106 MMOL/L (ref 98–107)
CO2 SERPL-SCNC: 33 MMOL/L (ref 21–32)
CREAT BLD-MCNC: 1.1 MG/DL (ref 0.55–1.02)
GLUCOSE BLD-MCNC: 103 MG/DL (ref 70–99)
GLUCOSE BLDC GLUCOMTR-MCNC: 102 MG/DL (ref 70–99)
GLUCOSE BLDC GLUCOMTR-MCNC: 113 MG/DL (ref 70–99)
GLUCOSE BLDC GLUCOMTR-MCNC: 136 MG/DL (ref 70–99)
GLUCOSE BLDC GLUCOMTR-MCNC: 207 MG/DL (ref 70–99)
OSMOLALITY SERPL CALC.SUM OF ELEC: 307 MOSM/KG (ref 275–295)
PHOSPHATE SERPL-MCNC: 2.3 MG/DL (ref 2.5–4.9)
POTASSIUM SERPL-SCNC: 2.9 MMOL/L (ref 3.5–5.1)
SODIUM SERPL-SCNC: 144 MMOL/L (ref 136–145)

## 2019-02-13 PROCEDURE — 99231 SBSQ HOSP IP/OBS SF/LOW 25: CPT | Performed by: SURGERY

## 2019-02-13 PROCEDURE — 74019 RADEX ABDOMEN 2 VIEWS: CPT | Performed by: SURGERY

## 2019-02-13 PROCEDURE — 99233 SBSQ HOSP IP/OBS HIGH 50: CPT | Performed by: HOSPITALIST

## 2019-02-13 NOTE — PROGRESS NOTES
[facility]  Hospitalist Progress Note     Janit Constable Patient Status:  Inpatient    10/25/1932  80year old CSN [id number]   Location 467/467-A Attending Paige Martinez MD   Hosp Day # 1 PCP Marlena Wade MD     ASSESSMENT/PLAN    Sma CL  80*  106   CO2  28  33.0*   ALKPHO  79   --    AST  41   --    ALT  20   --    BILT  1.3*   --    TP  7.4   --      No results for input(s): PT, INR, PTT in the last 168 hours.     • potassium chloride  40 mEq Intravenous Once   • Heparin Sodium ([place]

## 2019-02-13 NOTE — PROGRESS NOTES
[facility]    Progress Note    Arden Sommer Patient Status:  Inpatient    10/25/1932 MRN [id number]   Location [facility] 4W/SW/SE Attending Reggie Mendieta MD   Hosp Day # 1 PCP Dorothy Mueller MD     Assessment and Plan:     Xra TP 7.4 02/12/2019    AST 41 02/12/2019    ALT 20 02/12/2019    TSH 0.80 02/13/2018    MARILYN 39 02/12/2019    LIP 30 02/12/2019    DDIMER 2.47 (H) 05/27/2017    MG 1.5 (L) 05/30/2017    PHOS 2.3 (L) 02/13/2019    TROP 0.03 05/28/2017       Ct Abdomen+pelvis(c

## 2019-02-14 LAB
ANION GAP SERPL CALC-SCNC: 6 MMOL/L (ref 0–18)
BASOPHILS # BLD AUTO: 0 X10(3) UL (ref 0–0.2)
BASOPHILS NFR BLD AUTO: 0 %
BUN BLD-MCNC: 19 MG/DL (ref 7–18)
BUN/CREAT SERPL: 20.7 (ref 10–20)
CALCIUM BLD-MCNC: 8.4 MG/DL (ref 8.5–10.1)
CHLORIDE SERPL-SCNC: 111 MMOL/L (ref 98–107)
CO2 SERPL-SCNC: 30 MMOL/L (ref 21–32)
CREAT BLD-MCNC: 0.92 MG/DL (ref 0.55–1.02)
DEPRECATED RDW RBC AUTO: 45.5 FL (ref 35.1–46.3)
EOSINOPHIL # BLD AUTO: 0.01 X10(3) UL (ref 0–0.7)
EOSINOPHIL NFR BLD AUTO: 0.2 %
ERYTHROCYTE [DISTWIDTH] IN BLOOD BY AUTOMATED COUNT: 12.8 % (ref 11–15)
GLUCOSE BLD-MCNC: 133 MG/DL (ref 70–99)
GLUCOSE BLDC GLUCOMTR-MCNC: 103 MG/DL (ref 70–99)
GLUCOSE BLDC GLUCOMTR-MCNC: 119 MG/DL (ref 70–99)
GLUCOSE BLDC GLUCOMTR-MCNC: 134 MG/DL (ref 70–99)
GLUCOSE BLDC GLUCOMTR-MCNC: 179 MG/DL (ref 70–99)
GLUCOSE BLDC GLUCOMTR-MCNC: 184 MG/DL (ref 70–99)
HAV IGM SER QL: 1.6 MG/DL (ref 1.6–2.6)
HAV IGM SER QL: 2.4 MG/DL (ref 1.6–2.6)
HCT VFR BLD AUTO: 30 % (ref 35–48)
HGB BLD-MCNC: 9.5 G/DL (ref 12–16)
IMM GRANULOCYTES # BLD AUTO: 0.01 X10(3) UL (ref 0–1)
IMM GRANULOCYTES NFR BLD: 0.2 %
LYMPHOCYTES # BLD AUTO: 0.77 X10(3) UL (ref 1–4)
LYMPHOCYTES NFR BLD AUTO: 18.9 %
MCH RBC QN AUTO: 30.8 PG (ref 26–34)
MCHC RBC AUTO-ENTMCNC: 31.7 G/DL (ref 31–37)
MCV RBC AUTO: 97.4 FL (ref 80–100)
MONOCYTES # BLD AUTO: 0.7 X10(3) UL (ref 0.1–1)
MONOCYTES NFR BLD AUTO: 17.2 %
NEUTROPHILS # BLD AUTO: 2.58 X10 (3) UL (ref 1.5–7.7)
NEUTROPHILS # BLD AUTO: 2.58 X10(3) UL (ref 1.5–7.7)
NEUTROPHILS NFR BLD AUTO: 63.5 %
OSMOLALITY SERPL CALC.SUM OF ELEC: 308 MOSM/KG (ref 275–295)
PLATELET # BLD AUTO: 291 10(3)UL (ref 150–450)
POTASSIUM SERPL-SCNC: 3 MMOL/L (ref 3.5–5.1)
POTASSIUM SERPL-SCNC: 3.3 MMOL/L (ref 3.5–5.1)
POTASSIUM SERPL-SCNC: 3.3 MMOL/L (ref 3.5–5.1)
RBC # BLD AUTO: 3.08 X10(6)UL (ref 3.8–5.3)
SODIUM SERPL-SCNC: 147 MMOL/L (ref 136–145)
WBC # BLD AUTO: 4.1 X10(3) UL (ref 4–11)

## 2019-02-14 PROCEDURE — 99231 SBSQ HOSP IP/OBS SF/LOW 25: CPT | Performed by: SURGERY

## 2019-02-14 PROCEDURE — 99233 SBSQ HOSP IP/OBS HIGH 50: CPT | Performed by: HOSPITALIST

## 2019-02-14 RX ORDER — KETOROLAC TROMETHAMINE 15 MG/ML
15 INJECTION, SOLUTION INTRAMUSCULAR; INTRAVENOUS EVERY 6 HOURS PRN
Status: DISPENSED | OUTPATIENT
Start: 2019-02-14 | End: 2019-02-16

## 2019-02-14 RX ORDER — SODIUM CHLORIDE 9 MG/ML
INJECTION, SOLUTION INTRAVENOUS
Status: COMPLETED
Start: 2019-02-14 | End: 2019-02-14

## 2019-02-14 RX ORDER — MAGNESIUM SULFATE HEPTAHYDRATE 40 MG/ML
2 INJECTION, SOLUTION INTRAVENOUS ONCE
Status: COMPLETED | OUTPATIENT
Start: 2019-02-14 | End: 2019-02-14

## 2019-02-14 RX ORDER — POTASSIUM CHLORIDE 14.9 MG/ML
20 INJECTION INTRAVENOUS ONCE
Status: COMPLETED | OUTPATIENT
Start: 2019-02-14 | End: 2019-02-14

## 2019-02-14 NOTE — PAYOR COMM NOTE
--------------  ADMISSION REVIEW     Payor: BCBS MEDICARE ADV PPO  Subscriber #:  [id number]  Authorization Number: [id number]    Admit date: 2/12/19  Admit time: [facility]         Patient Seen in: [facility] Emergency Department    History   Patient p None (Room air)     Constitutional: Oriented to person, place, and time. Appears well-developed. No distress. Head: Normocephalic and atraumatic. Eyes: Conjunctivae are normal. Pupils are equal, round, and reactive to light.    Neck: Normal range of mo the following components:    Neutrophil Absolute Prelim 7.75 (*)     Neutrophil Absolute 7.75 (*)     Monocyte Absolute 1.32 (*)         CT Abdomen and Pelvis (without IV contrast):    IMPRESSION:  1.   Dilated loops of proximal and mid small bowel with mat activities, who lives with her daughter and family, has h/o uterine cancer treated with surgeries and radiation more then 20 yrs ago, also h/o HTN, esophagitis, came to ER for evaluation of intractable nausea and vomiting, started on Saturday after eating EVERY DAY   AMLODIPINE BESYLATE 10 MG Oral Tab TAKE 1 TABLET(10 MG) BY MOUTH DAILY   DULOXETINE HCL 60 MG Oral Cap DR Particles TAKE 1 CAPSULE(60 MG) BY MOUTH DAILY   METOPROLOL TARTRATE 50 MG Oral Tab TAKE 1 TABLET(50 MG) BY MOUTH TWICE DAILY   OMEPRAZOLE HCT 35.6 02/12/2019    .0 02/12/2019    CREATSERUM 3.05 (H) 02/12/2019    BUN 62 (H) 02/12/2019     (L) 02/12/2019    K 4.0 02/12/2019    CL 80 (L) 02/12/2019    CO2 28 02/12/2019     (H) 02/12/2019    CA 9.5 02/12/2019    ALB 3.7 02 IVF, antiemetics and pain medication  -Surgical consultation, d/w Dr. Stacia Bliss    Acute renal failure  Suspect profound dehydration  No previous history of renal disease to patient's knowledge  -IVF  -monitor    DM2, diet controlled, A1c 7  HTN, currently bl 02/12/19   0002  02/13/19   0458   GLU  360*  103*   BUN  62*  37*   CREATSERUM  3.05*  1.10*   GFRAA  15*  53*   GFRNAA  13*  46*   CA  9.5  7.8*   ALB  3.7  2.5*   NA  133*  144   K  4.0  2.9*   CL  80*  106   CO2  28  33.0*   ALKPHO  79   --    AST  41 tube in place  CV: RRR, S1S2, and intact distal pulses. No gallop, rub, murmur. Pulm: Effort and breath sounds normal. No distress, wheezes, rales, rhonchi. Abd: Soft, quiet bowel sounds  Neuro:  Normal reflexes, CN.  Sensory/motor exams grossly normal de 5000 Units Subcutaneous (Right Upper Abdomen)     2/13/2019 2046 Given 5000 Units Subcutaneous (Right Lower Abdomen)       insulin detemir (LEVEMIR) 100 UNIT/ML flextouch 8 Units     Date Action Dose Route     2/14/2019 1315 Given 8 Units Subcutaneous (Rig

## 2019-02-14 NOTE — PROGRESS NOTES
Spoke with Dr Hilario Lopez about patient with PSVT per tele, ordered to continue to replace potassium and recheck once completed.

## 2019-02-14 NOTE — PROGRESS NOTES
[facility]    Progress Note    Ahsley Lyle Patient Status:  Inpatient    10/25/1932 MRN [id number]   Location [facility] 4W/SW/SE Attending Savanah Cee MD   Hosp Day # 2 PCP Eli Morris MD     Assessment and Plan:     Not 02/14/2019    ALB 2.5 (L) 02/13/2019    ALKPHO 79 02/12/2019    BILT 1.3 (H) 02/12/2019    TP 7.4 02/12/2019    AST 41 02/12/2019    ALT 20 02/12/2019    TSH 0.80 02/13/2018    MARILYN 39 02/12/2019    LIP 30 02/12/2019    DDIMER 2.47 (H) 05/27/2017    MG 2.4

## 2019-02-14 NOTE — PROGRESS NOTES
[place] [facility]  Hospitalist Progress Note     Pravin Vela Patient Status:  Inpatient    10/25/1932  80year old [facility] [id number]   Location 467/467-A Attending Juan J Jane MD   Hosp Day # 2 PCP Marianela Anderson MD     ASSESSMENT/PLAN    Sma MCV  93.2  97.4   MCH  31.4  30.8   MCHC  33.7  31.7   RDW  13.2  12.8   NEPRELIM  7.75*  2.58   WBC  10.2  4.1   PLT  321.0  291.0     Recent Labs   Lab  02/12/19   0002  02/13/19   0458  02/13/19   2343  02/14/19   0908   GLU  360*  103*   --   133*

## 2019-02-15 ENCOUNTER — APPOINTMENT (OUTPATIENT)
Dept: GENERAL RADIOLOGY | Facility: HOSPITAL | Age: 84
DRG: 388 | End: 2019-02-15
Attending: SURGERY
Payer: MEDICARE

## 2019-02-15 ENCOUNTER — ANESTHESIA (OUTPATIENT)
Dept: ENDOSCOPY | Facility: HOSPITAL | Age: 84
DRG: 388 | End: 2019-02-15
Payer: MEDICARE

## 2019-02-15 ENCOUNTER — ANESTHESIA EVENT (OUTPATIENT)
Dept: ENDOSCOPY | Facility: HOSPITAL | Age: 84
DRG: 388 | End: 2019-02-15
Payer: MEDICARE

## 2019-02-15 LAB
ANION GAP SERPL CALC-SCNC: 5 MMOL/L (ref 0–18)
BASOPHILS # BLD AUTO: 0.01 X10(3) UL (ref 0–0.2)
BASOPHILS NFR BLD AUTO: 0.2 %
BUN BLD-MCNC: 13 MG/DL (ref 7–18)
BUN/CREAT SERPL: 13.8 (ref 10–20)
CALCIUM BLD-MCNC: 8.3 MG/DL (ref 8.5–10.1)
CHLORIDE SERPL-SCNC: 112 MMOL/L (ref 98–107)
CO2 SERPL-SCNC: 29 MMOL/L (ref 21–32)
CREAT BLD-MCNC: 0.94 MG/DL (ref 0.55–1.02)
DEPRECATED RDW RBC AUTO: 46 FL (ref 35.1–46.3)
EOSINOPHIL # BLD AUTO: 0.07 X10(3) UL (ref 0–0.7)
EOSINOPHIL NFR BLD AUTO: 1.6 %
ERYTHROCYTE [DISTWIDTH] IN BLOOD BY AUTOMATED COUNT: 12.7 % (ref 11–15)
GLUCOSE BLD-MCNC: 111 MG/DL (ref 70–99)
GLUCOSE BLDC GLUCOMTR-MCNC: 112 MG/DL (ref 70–99)
GLUCOSE BLDC GLUCOMTR-MCNC: 174 MG/DL (ref 70–99)
GLUCOSE BLDC GLUCOMTR-MCNC: 92 MG/DL (ref 70–99)
GLUCOSE BLDC GLUCOMTR-MCNC: 95 MG/DL (ref 70–99)
HAV IGM SER QL: 2 MG/DL (ref 1.6–2.6)
HCT VFR BLD AUTO: 27.9 % (ref 35–48)
HGB BLD-MCNC: 8.7 G/DL (ref 12–16)
IMM GRANULOCYTES # BLD AUTO: 0.01 X10(3) UL (ref 0–1)
IMM GRANULOCYTES NFR BLD: 0.2 %
INR BLD: 1.4 (ref 0.9–1.2)
LYMPHOCYTES # BLD AUTO: 1.02 X10(3) UL (ref 1–4)
LYMPHOCYTES NFR BLD AUTO: 23.9 %
MCH RBC QN AUTO: 30.9 PG (ref 26–34)
MCHC RBC AUTO-ENTMCNC: 31.2 G/DL (ref 31–37)
MCV RBC AUTO: 98.9 FL (ref 80–100)
MONOCYTES # BLD AUTO: 0.68 X10(3) UL (ref 0.1–1)
MONOCYTES NFR BLD AUTO: 16 %
NEUTROPHILS # BLD AUTO: 2.47 X10 (3) UL (ref 1.5–7.7)
NEUTROPHILS # BLD AUTO: 2.47 X10(3) UL (ref 1.5–7.7)
NEUTROPHILS NFR BLD AUTO: 58.1 %
OSMOLALITY SERPL CALC.SUM OF ELEC: 303 MOSM/KG (ref 275–295)
PLATELET # BLD AUTO: 262 10(3)UL (ref 150–450)
POTASSIUM SERPL-SCNC: 2.8 MMOL/L (ref 3.5–5.1)
POTASSIUM SERPL-SCNC: 2.8 MMOL/L (ref 3.5–5.1)
POTASSIUM SERPL-SCNC: 3.4 MMOL/L (ref 3.5–5.1)
PROTHROMBIN TIME: 16.6 SECONDS (ref 11.8–14.5)
RBC # BLD AUTO: 2.82 X10(6)UL (ref 3.8–5.3)
SODIUM SERPL-SCNC: 146 MMOL/L (ref 136–145)
VIT B12 SERPL-MCNC: 304 PG/ML (ref 193–986)
WBC # BLD AUTO: 4.3 X10(3) UL (ref 4–11)

## 2019-02-15 PROCEDURE — 0DB78ZX EXCISION OF STOMACH, PYLORUS, VIA NATURAL OR ARTIFICIAL OPENING ENDOSCOPIC, DIAGNOSTIC: ICD-10-PCS | Performed by: INTERNAL MEDICINE

## 2019-02-15 PROCEDURE — 0DB68ZX EXCISION OF STOMACH, VIA NATURAL OR ARTIFICIAL OPENING ENDOSCOPIC, DIAGNOSTIC: ICD-10-PCS | Performed by: INTERNAL MEDICINE

## 2019-02-15 PROCEDURE — 0DB38ZX EXCISION OF LOWER ESOPHAGUS, VIA NATURAL OR ARTIFICIAL OPENING ENDOSCOPIC, DIAGNOSTIC: ICD-10-PCS | Performed by: INTERNAL MEDICINE

## 2019-02-15 PROCEDURE — 99222 1ST HOSP IP/OBS MODERATE 55: CPT | Performed by: INTERNAL MEDICINE

## 2019-02-15 PROCEDURE — 74021 RADEX ABDOMEN 3+ VIEWS: CPT | Performed by: SURGERY

## 2019-02-15 PROCEDURE — 99233 SBSQ HOSP IP/OBS HIGH 50: CPT | Performed by: HOSPITALIST

## 2019-02-15 PROCEDURE — 99231 SBSQ HOSP IP/OBS SF/LOW 25: CPT | Performed by: SURGERY

## 2019-02-15 RX ORDER — POTASSIUM CHLORIDE 14.9 MG/ML
20 INJECTION INTRAVENOUS ONCE
Status: COMPLETED | OUTPATIENT
Start: 2019-02-15 | End: 2019-02-15

## 2019-02-15 RX ORDER — DEXTROSE MONOHYDRATE 25 G/50ML
50 INJECTION, SOLUTION INTRAVENOUS
Status: DISCONTINUED | OUTPATIENT
Start: 2019-02-15 | End: 2019-02-15

## 2019-02-15 RX ORDER — ONDANSETRON 2 MG/ML
4 INJECTION INTRAMUSCULAR; INTRAVENOUS ONCE AS NEEDED
Status: ACTIVE | OUTPATIENT
Start: 2019-02-15 | End: 2019-02-15

## 2019-02-15 RX ORDER — NALOXONE HYDROCHLORIDE 0.4 MG/ML
80 INJECTION, SOLUTION INTRAMUSCULAR; INTRAVENOUS; SUBCUTANEOUS AS NEEDED
Status: ACTIVE | OUTPATIENT
Start: 2019-02-15 | End: 2019-02-16

## 2019-02-15 RX ORDER — DEXTROSE, SODIUM CHLORIDE, AND POTASSIUM CHLORIDE 5; .9; .15 G/100ML; G/100ML; G/100ML
INJECTION INTRAVENOUS CONTINUOUS
Status: DISCONTINUED | OUTPATIENT
Start: 2019-02-15 | End: 2019-02-17

## 2019-02-15 RX ORDER — METOPROLOL TARTRATE 50 MG/1
50 TABLET, FILM COATED ORAL
Status: DISCONTINUED | OUTPATIENT
Start: 2019-02-16 | End: 2019-02-18

## 2019-02-15 RX ORDER — SODIUM CHLORIDE, SODIUM LACTATE, POTASSIUM CHLORIDE, CALCIUM CHLORIDE 600; 310; 30; 20 MG/100ML; MG/100ML; MG/100ML; MG/100ML
INJECTION, SOLUTION INTRAVENOUS CONTINUOUS PRN
Status: DISCONTINUED | OUTPATIENT
Start: 2019-02-15 | End: 2019-02-15 | Stop reason: SURG

## 2019-02-15 RX ORDER — PANTOPRAZOLE SODIUM 40 MG/1
40 TABLET, DELAYED RELEASE ORAL
Status: DISCONTINUED | OUTPATIENT
Start: 2019-02-15 | End: 2019-02-18

## 2019-02-15 RX ORDER — LIDOCAINE HYDROCHLORIDE 10 MG/ML
INJECTION, SOLUTION EPIDURAL; INFILTRATION; INTRACAUDAL; PERINEURAL AS NEEDED
Status: DISCONTINUED | OUTPATIENT
Start: 2019-02-15 | End: 2019-02-15 | Stop reason: SURG

## 2019-02-15 RX ORDER — SODIUM CHLORIDE, SODIUM LACTATE, POTASSIUM CHLORIDE, CALCIUM CHLORIDE 600; 310; 30; 20 MG/100ML; MG/100ML; MG/100ML; MG/100ML
INJECTION, SOLUTION INTRAVENOUS CONTINUOUS
Status: DISCONTINUED | OUTPATIENT
Start: 2019-02-15 | End: 2019-02-16

## 2019-02-15 RX ORDER — SODIUM CHLORIDE 9 MG/ML
INJECTION, SOLUTION INTRAVENOUS
Status: COMPLETED
Start: 2019-02-15 | End: 2019-02-15

## 2019-02-15 RX ADMIN — LIDOCAINE HYDROCHLORIDE 30 MG: 10 INJECTION, SOLUTION EPIDURAL; INFILTRATION; INTRACAUDAL; PERINEURAL at 16:23:00

## 2019-02-15 RX ADMIN — SODIUM CHLORIDE, SODIUM LACTATE, POTASSIUM CHLORIDE, CALCIUM CHLORIDE: 600; 310; 30; 20 INJECTION, SOLUTION INTRAVENOUS at 16:19:00

## 2019-02-15 RX ADMIN — SODIUM CHLORIDE, SODIUM LACTATE, POTASSIUM CHLORIDE, CALCIUM CHLORIDE: 600; 310; 30; 20 INJECTION, SOLUTION INTRAVENOUS at 16:40:00

## 2019-02-15 NOTE — PAYOR COMM NOTE
--------------  CONTINUED STAY REVIEW    Payor: BCBS MEDICARE ADV PPO  Subscriber #:  [id number]  Authorization Number: [id number]    2/15 GEN SURG    Assessment and Plan:      If xrays ok, d/c ng and begin clear liq trial.  Pt understands possible outco 34  Remote history of uterine cancer

## 2019-02-15 NOTE — PLAN OF CARE
GASTROINTESTINAL - ADULT     • Minimal or absence of nausea and vomiting Progressing     • Maintains or returns to baseline bowel function Progressing           PAIN - ADULT     • Verbalizes/displays adequate comfort level or patient's stated pain goal Pro

## 2019-02-15 NOTE — PROGRESS NOTES
[facility]  Hospitalist Progress Note     Fantasma Tsai Patient Status:  Inpatient    10/25/1932  80year old [facility] [id number]   Location 467/467-A Attending Ingris Vann MD   Hosp Day # 3 PCP Karolina Atkins MD     ASSESSMENT/PLAN    Sma 33.7  31.7  31.2   RDW  13.2  12.8  12.7   NEPRELIM  7.75*  2.58  2.47   WBC  10.2  4.1  4.3   PLT  321.0  291.0  262.0     Recent Labs   Lab  02/12/19   0002   02/13/19   0458  02/13/19   2343  02/14/19   0908  02/15/19   0514   GLU  360*   --   103*   --

## 2019-02-15 NOTE — PROGRESS NOTES
[place] [facility] - [facility]    Progress Note    Gaurav Quintero Patient Status:  Inpatient    10/25/1932 MRN [id number]   Location [facility] 4W/SW/SE Attending Daren Galloway MD   Hosp Day # 3 PCP Colleen Barboza MD     Assessment and Plan:     If CA 8.3 (L) 02/15/2019    ALB 2.5 (L) 02/13/2019    ALKPHO 79 02/12/2019    BILT 1.3 (H) 02/12/2019    TP 7.4 02/12/2019    AST 41 02/12/2019    ALT 20 02/12/2019    INR 1.4 (H) 02/15/2019    TSH 0.80 02/13/2018    MARILYN 39 02/12/2019    LIP 30 02/12/2019

## 2019-02-15 NOTE — ANESTHESIA PREPROCEDURE EVALUATION
Anesthesia PreOp Note    HPI:     Arden Sommer is a 80year old female who presents for preoperative consultation requested by: Laya Tavarez MD    Date of Surgery: 2/11/2019 - 2/15/2019    Procedure(s):  ESOPHAGOGASTRODUODENOSCOPY (EGD)  In due to age also egd then but needs them intermittently.    • HERNIA SURGERY  6861    umbilical hernia    • HYSTERECTOMY  1995    total   • KNEE ARTHROSCOPY  1997    torn cartilage   • OTHER SURGICAL HISTORY      ileum resected bc of radiation damage   • RAD tablet Oral Q15 Min PRN Cal Neves MD    glucose (DEX4) oral liquid 15 g 15 g Oral Q15 Min PRN Cal Neves MD    ondansetron HCl [facility]) injection 4 mg 4 mg Intravenous Q6H PRN Cal Neves MD    Insulin Regular Human ([place] Comment: wine with dinner      Drug use: No      Sexual activity: Not on file    Lifestyle      Physical activity:        Days per week: Not on file        Minutes per session: Not on file      Stress: Not on file    Relationships      Social [facility] 02/15/2019     Lab Results   Component Value Date    INR 1.4 (H) 02/15/2019       Vital Signs: Body mass index is 24.21 kg/m². height is 5' 6\" and weight is 150 lb. Her oral temperature is 97.9 °F (36.6 °C).  Her blood pressure is 161/60 (abnormal) and questions were answered to the best of my ability. The patient desires the anesthetic management as planned.   Agustín Donaldson  2/15/2019 4:05 PM

## 2019-02-15 NOTE — CONSULTS
[facility]    PATIENT'S NAME: Bowen Kaplan   ATTENDING PHYSICIAN: Med Madrigal MD   CONSULTING PHYSICIAN: Naveed Nelson MD   PATIENT ACCOUNT#:   [id number]    LOCATION:  [facility] ROOM 19 [facility] 10  MEDICAL RECORD #:   H the past and possibly EGDs in the past when in [place]. She states she has had \"insignificant colon polyps,\" and these procedures had been done for screening. She does not recall why she may have had an upper endoscopy.      PAST MEDICAL HISTORY:  Uteri cerebrovascular symptoms. No skin symptoms. Positive dehydration, now resolved. All other review of systems, 12 points, negative except as stated above. PHYSICAL EXAMINATION:    GENERAL:  A pleasant female, alert and oriented, no acute distress. resultant erosions or ulcerations, stress ulcers, but upper GI vascular lesion, neoplasia, or active bleeding should be ruled out, and I have therefore scheduled EGD for today. Patient also has significant Celebrex use, NSAID ulcer possible.   I have expla

## 2019-02-15 NOTE — BRIEF OP NOTE
Pre-Operative Diagnosis: upper gi bleed     Post-Operative Diagnosis: Esophagitis, hiatal hernia, small gastric ulcer, gastropathy     Procedure Performed:   Procedure(s):  esophagogastroduodenoscopy with biopsies    Surgeon(s) and Role:     * Raeann Stephens

## 2019-02-15 NOTE — CONSULTS
GI CONSULTATION:  Available medical records reviewed. Patient interviewed and examined. Please see orders and transcription. ( Dictated [id number] ). Patient with GI bleeding per NG tube, scheduled for EGD today. Further recommendations to follow EGD.  Elroy David

## 2019-02-16 LAB
ANION GAP SERPL CALC-SCNC: 7 MMOL/L (ref 0–18)
BASOPHILS # BLD AUTO: 0.01 X10(3) UL (ref 0–0.2)
BASOPHILS NFR BLD AUTO: 0.2 %
BUN BLD-MCNC: 9 MG/DL (ref 7–18)
BUN/CREAT SERPL: 12.7 (ref 10–20)
C DIFF TOX B STL QL: NEGATIVE
CALCIUM BLD-MCNC: 7.6 MG/DL (ref 8.5–10.1)
CHLORIDE SERPL-SCNC: 113 MMOL/L (ref 98–107)
CO2 SERPL-SCNC: 25 MMOL/L (ref 21–32)
CREAT BLD-MCNC: 0.71 MG/DL (ref 0.55–1.02)
DEPRECATED RDW RBC AUTO: 46 FL (ref 35.1–46.3)
EOSINOPHIL # BLD AUTO: 0.13 X10(3) UL (ref 0–0.7)
EOSINOPHIL NFR BLD AUTO: 2.9 %
ERYTHROCYTE [DISTWIDTH] IN BLOOD BY AUTOMATED COUNT: 12.9 % (ref 11–15)
GLUCOSE BLD-MCNC: 83 MG/DL (ref 70–99)
GLUCOSE BLDC GLUCOMTR-MCNC: 110 MG/DL (ref 70–99)
GLUCOSE BLDC GLUCOMTR-MCNC: 130 MG/DL (ref 70–99)
GLUCOSE BLDC GLUCOMTR-MCNC: 145 MG/DL (ref 70–99)
GLUCOSE BLDC GLUCOMTR-MCNC: 94 MG/DL (ref 70–99)
HAV IGM SER QL: 1.6 MG/DL (ref 1.6–2.6)
HCT VFR BLD AUTO: 23.9 % (ref 35–48)
HGB BLD-MCNC: 7.6 G/DL (ref 12–16)
IMM GRANULOCYTES # BLD AUTO: 0.04 X10(3) UL (ref 0–1)
IMM GRANULOCYTES NFR BLD: 0.9 %
LYMPHOCYTES # BLD AUTO: 1.03 X10(3) UL (ref 1–4)
LYMPHOCYTES NFR BLD AUTO: 22.9 %
MCH RBC QN AUTO: 31.1 PG (ref 26–34)
MCHC RBC AUTO-ENTMCNC: 31.8 G/DL (ref 31–37)
MCV RBC AUTO: 98 FL (ref 80–100)
MONOCYTES # BLD AUTO: 0.63 X10(3) UL (ref 0.1–1)
MONOCYTES NFR BLD AUTO: 14 %
NEUTROPHILS # BLD AUTO: 2.66 X10 (3) UL (ref 1.5–7.7)
NEUTROPHILS # BLD AUTO: 2.66 X10(3) UL (ref 1.5–7.7)
NEUTROPHILS NFR BLD AUTO: 59.1 %
OSMOLALITY SERPL CALC.SUM OF ELEC: 298 MOSM/KG (ref 275–295)
PLATELET # BLD AUTO: 217 10(3)UL (ref 150–450)
POTASSIUM SERPL-SCNC: 3.1 MMOL/L (ref 3.5–5.1)
POTASSIUM SERPL-SCNC: 4.4 MMOL/L (ref 3.5–5.1)
RBC # BLD AUTO: 2.44 X10(6)UL (ref 3.8–5.3)
SODIUM SERPL-SCNC: 145 MMOL/L (ref 136–145)
WBC # BLD AUTO: 4.5 X10(3) UL (ref 4–11)

## 2019-02-16 PROCEDURE — 99232 SBSQ HOSP IP/OBS MODERATE 35: CPT | Performed by: INTERNAL MEDICINE

## 2019-02-16 PROCEDURE — 99232 SBSQ HOSP IP/OBS MODERATE 35: CPT | Performed by: SURGERY

## 2019-02-16 PROCEDURE — 99233 SBSQ HOSP IP/OBS HIGH 50: CPT | Performed by: HOSPITALIST

## 2019-02-16 RX ORDER — POTASSIUM CHLORIDE 20 MEQ/1
40 TABLET, EXTENDED RELEASE ORAL EVERY 4 HOURS
Status: COMPLETED | OUTPATIENT
Start: 2019-02-16 | End: 2019-02-16

## 2019-02-16 RX ORDER — MAGNESIUM OXIDE 400 MG (241.3 MG MAGNESIUM) TABLET
400 TABLET ONCE
Status: COMPLETED | OUTPATIENT
Start: 2019-02-16 | End: 2019-02-16

## 2019-02-16 RX ORDER — TRAMADOL HYDROCHLORIDE 50 MG/1
50 TABLET ORAL EVERY 6 HOURS PRN
Status: DISCONTINUED | OUTPATIENT
Start: 2019-02-16 | End: 2019-02-18

## 2019-02-16 NOTE — OPERATIVE REPORT
[facility]    PATIENT'S NAME: Robert Margos   ATTENDING PHYSICIAN: Med Hart MD   OPERATING PHYSICIAN: Jeana Jiang MD   PATIENT ACCOUNT#:   [de-identified]    LOCATION:  [address] [place], [place] [address] [address] RECORD #:   [id number]       DATE Hollis ulcerations in the hernia. 3.   The stomach was entered and viewed in it's entirety. As above, there was a moderate hiatal hernia without bleeding. There was no blood anywhere in the stomach or the esophagus.   There was a superficial linear ulce

## 2019-02-16 NOTE — PLAN OF CARE
Problem: PAIN - ADULT  Goal: Verbalizes/displays adequate comfort level or patient's stated pain goal  INTERVENTIONS:  - Encourage pt to monitor pain and request assistance  - Assess pain using appropriate pain scale  - Administer analgesics based on type still having loose-watery stools  Specimen sent for CDIFF - pending

## 2019-02-16 NOTE — PROGRESS NOTES
[facility]    Progress Note    Neelam Simmons Patient Status:  Inpatient    10/25/1932 MRN [id number]   Location [facility] 4W/SW/SE Attending Jenny Kelly MD   Hosp Day # 4 PCP Nina Fortune MD     Assessment and Plan:       Melanie Ashraf 02/16/2019    .0 02/16/2019    CREATSERUM 0.71 02/16/2019    BUN 9 02/16/2019     02/16/2019    K 3.1 (L) 02/16/2019     (H) 02/16/2019    CO2 25.0 02/16/2019    GLU 83 02/16/2019    CA 7.6 (L) 02/16/2019    ALB 2.5 (L) 02/13/2019    ALK

## 2019-02-16 NOTE — PROGRESS NOTES
[facility]  Hospitalist Progress Note     Arden Sommer Patient Status:  Inpatient    10/25/1932  80year old CSN [id number]   Location 467/467-A Attending Reggie Mendieta MD   Hosp Day # 4 PCP Dorothy Mueller MD     ASSESSMENT/PLAN    Sma normal. No distress, wheezes, rales, rhonchi. Abd: Soft, quiet bowel sounds  Neuro: Normal reflexes, CN. Sensory/motor exams grossly normal deficit. MS: No joint effusions. No peripheral edema. Skin: Skin is warm and dry.  No rashes, erythema, diaphore Daily(Beta Blocker)   • insulin detemir  8 Units Subcutaneous Q24H     [DISCONTINUED] glucose **OR** [DISCONTINUED] Glucose-Vitamin C **OR** [DISCONTINUED] dextrose **OR** glucose **OR** Glucose-Vitamin C, Normal Saline Flush, dextrose, Glucose-Vitamin C,

## 2019-02-17 LAB
ANION GAP SERPL CALC-SCNC: 5 MMOL/L (ref 0–18)
BASOPHILS # BLD AUTO: 0.01 X10(3) UL (ref 0–0.2)
BASOPHILS NFR BLD AUTO: 0.2 %
BUN BLD-MCNC: 8 MG/DL (ref 7–18)
BUN/CREAT SERPL: 10.4 (ref 10–20)
CALCIUM BLD-MCNC: 7.3 MG/DL (ref 8.5–10.1)
CHLORIDE SERPL-SCNC: 114 MMOL/L (ref 98–107)
CO2 SERPL-SCNC: 23 MMOL/L (ref 21–32)
CREAT BLD-MCNC: 0.77 MG/DL (ref 0.55–1.02)
DEPRECATED RDW RBC AUTO: 46.9 FL (ref 35.1–46.3)
EOSINOPHIL # BLD AUTO: 0.28 X10(3) UL (ref 0–0.7)
EOSINOPHIL NFR BLD AUTO: 5.7 %
ERYTHROCYTE [DISTWIDTH] IN BLOOD BY AUTOMATED COUNT: 12.8 % (ref 11–15)
GLUCOSE BLD-MCNC: 71 MG/DL (ref 70–99)
GLUCOSE BLDC GLUCOMTR-MCNC: 116 MG/DL (ref 70–99)
GLUCOSE BLDC GLUCOMTR-MCNC: 122 MG/DL (ref 70–99)
GLUCOSE BLDC GLUCOMTR-MCNC: 189 MG/DL (ref 70–99)
GLUCOSE BLDC GLUCOMTR-MCNC: 78 MG/DL (ref 70–99)
HAV IGM SER QL: 1.4 MG/DL (ref 1.6–2.6)
HCT VFR BLD AUTO: 24.4 % (ref 35–48)
HGB BLD-MCNC: 7.6 G/DL (ref 12–16)
IMM GRANULOCYTES # BLD AUTO: 0.03 X10(3) UL (ref 0–1)
IMM GRANULOCYTES NFR BLD: 0.6 %
LYMPHOCYTES # BLD AUTO: 1.61 X10(3) UL (ref 1–4)
LYMPHOCYTES NFR BLD AUTO: 32.8 %
MCH RBC QN AUTO: 31 PG (ref 26–34)
MCHC RBC AUTO-ENTMCNC: 31.1 G/DL (ref 31–37)
MCV RBC AUTO: 99.6 FL (ref 80–100)
MONOCYTES # BLD AUTO: 0.69 X10(3) UL (ref 0.1–1)
MONOCYTES NFR BLD AUTO: 14.1 %
NEUTROPHILS # BLD AUTO: 2.29 X10 (3) UL (ref 1.5–7.7)
NEUTROPHILS # BLD AUTO: 2.29 X10(3) UL (ref 1.5–7.7)
NEUTROPHILS NFR BLD AUTO: 46.6 %
OSMOLALITY SERPL CALC.SUM OF ELEC: 291 MOSM/KG (ref 275–295)
PLATELET # BLD AUTO: 246 10(3)UL (ref 150–450)
POTASSIUM SERPL-SCNC: 4.2 MMOL/L (ref 3.5–5.1)
RBC # BLD AUTO: 2.45 X10(6)UL (ref 3.8–5.3)
SODIUM SERPL-SCNC: 142 MMOL/L (ref 136–145)
WBC # BLD AUTO: 4.9 X10(3) UL (ref 4–11)

## 2019-02-17 PROCEDURE — 99233 SBSQ HOSP IP/OBS HIGH 50: CPT | Performed by: HOSPITALIST

## 2019-02-17 PROCEDURE — 99232 SBSQ HOSP IP/OBS MODERATE 35: CPT | Performed by: SURGERY

## 2019-02-17 PROCEDURE — 99232 SBSQ HOSP IP/OBS MODERATE 35: CPT | Performed by: INTERNAL MEDICINE

## 2019-02-17 NOTE — PROGRESS NOTES
[facility]     Gastroenterology Progress Note    Rissa Mosqueda Patient Status:  Inpatient    10/25/1932 MRN [id number]   Location [facility] 4W/SW/SE Attending Carolynn Collazo MD   Hosp Day # 5 PCP Giselle hSane MD injury, B12 deficiency, high cholesterol, diabetes, anemia, chronic osteoarthritis, prior tobacco use who was admitted 2/11 after presenting with 2-3 days of nausea and vomiting who had an NGT placed and evaluated by surgery for what was thought to be a pa

## 2019-02-17 NOTE — PROGRESS NOTES
[facility]     Gastroenterology Progress Note    Kayla Brasher Patient Status:  Inpatient    10/25/1932 MRN [id number]   Location [facility] 4W/SW/SE Attending Prisca Cameron MD   Hosp Day # 4 PCP Willi Navarro MD pelvic pain. Musculoskeletal: Negative for back pain, joint pain and gait problem. Skin: Negative for color change, pallor, rash and wound. Allergic/Immunologic: Negative for environmental allergies and food allergies.    Neurological: Negative for [facility] mass. There is no hepatosplenomegaly. There is no tenderness. There is no CVA tenderness. No hernia. Abdomen soft, nondistended, nontender. No mass or hepatosplenomegaly. Musculoskeletal: Normal range of motion. She exhibits no tenderness.    Lymphaden TP  7.4   --    --    --    --    --    --    --    --    MARILYN   --   39   --    --    --    --    --    --    --     < > = values in this interval not displayed.        Lab Results   Component Value Date    INR 1.4 (H) 02/15/2019       No results for inpu

## 2019-02-17 NOTE — PROGRESS NOTES
[facility]  Hospitalist Progress Note     Dionisio Connors Patient Status:  Inpatient    10/25/1932  80year old CSN [id number]   Location 467/467-A Attending Netta Escalera MD   Hosp Day # 5 PCP Pao Elizondo MD     ASSESSMENT/PLAN    Sma murmur. Pulm: Effort and breath sounds normal. No distress, wheezes, rales, rhonchi. Abd: Soft, quiet bowel sounds  Neuro: Normal reflexes, CN. Sensory/motor exams grossly normal deficit. MS: No joint effusions. No peripheral edema.   Skin: Skin is war Q24H     TraMADol HCl, [DISCONTINUED] glucose **OR** [DISCONTINUED] Glucose-Vitamin C **OR** [DISCONTINUED] dextrose **OR** glucose **OR** Glucose-Vitamin C, Normal Saline Flush, dextrose, Glucose-Vitamin C, glucose, ondansetron HCl, morphINE sulfate (PF)

## 2019-02-17 NOTE — PROGRESS NOTES
[facility]    Progress Note    Lydia Florentino Patient Status:  Inpatient    10/25/1932 MRN [id number]   Location [facility] 4W/SW/SE Attending Taco Wynn MD   Hosp Day # 5 PCP Brenda Márquez MD     Assessment and Plan:       Kyler Prophet (H) 02/12/2019    TP 7.4 02/12/2019    AST 41 02/12/2019    ALT 20 02/12/2019    INR 1.4 (H) 02/15/2019    TSH 0.80 02/13/2018    MARILYN 39 02/12/2019    LIP 30 02/12/2019    DDIMER 2.47 (H) 05/27/2017    MG 1.4 (L) 02/17/2019    PHOS 2.3 (L) 02/13/2019    TR

## 2019-02-18 ENCOUNTER — TELEPHONE (OUTPATIENT)
Dept: INTERNAL MEDICINE CLINIC | Facility: CLINIC | Age: 84
End: 2019-02-18

## 2019-02-18 VITALS
OXYGEN SATURATION: 96 % | SYSTOLIC BLOOD PRESSURE: 136 MMHG | DIASTOLIC BLOOD PRESSURE: 47 MMHG | HEIGHT: 66 IN | HEART RATE: 70 BPM | TEMPERATURE: 98 F | BODY MASS INDEX: 24.11 KG/M2 | WEIGHT: 150 LBS | RESPIRATION RATE: 18 BRPM

## 2019-02-18 LAB
GLUCOSE BLDC GLUCOMTR-MCNC: 141 MG/DL (ref 70–99)
GLUCOSE BLDC GLUCOMTR-MCNC: 86 MG/DL (ref 70–99)
HAV IGM SER QL: 2 MG/DL (ref 1.6–2.6)

## 2019-02-18 PROCEDURE — 99232 SBSQ HOSP IP/OBS MODERATE 35: CPT | Performed by: INTERNAL MEDICINE

## 2019-02-18 PROCEDURE — 99239 HOSP IP/OBS DSCHRG MGMT >30: CPT | Performed by: HOSPITALIST

## 2019-02-18 NOTE — CM/SW NOTE
[facility] orders received. SW met w/ pt to discuss eventual discharge needs. Pt's daughter, Christi Fry was also present. Pt's dtr requesting [facility] services and agreeable w/ [facility], due to [facility] affiliation.      SW spoke w/ Trang from [facility] and made referral. Dc today Mon 2/1

## 2019-02-18 NOTE — PAYOR COMM NOTE
REF: [id number] - DAYS PENDING IN [place] [address] FAXED ON 2/14/19 AND 2/15/19     2/16/19  ASSESSMENT/PLAN     Small bowel obstruction. Likely adhesive related to remote surgery for cancer. Bowel function is appears to have returned.   Multiple grossly normal deficit. MS: No joint effusions. No peripheral edema. Skin: Skin is warm and dry. No rashes, erythema, diaphoresis. Psych:   Normal mood and affect.  Calm, cooperative     Labs:        Recent Labs   Lab  02/14/19   0908  02/15/19   1363          2/17/19  ASSESSMENT/PLAN     Small bowel obstruction. Likely adhesive related to remote surgery for cancer. Bowel function is appears to have returned. Multiple loose, nondiarrheal, bowel movements overnight. Tolerating full liquid diet.   NG effusions. No peripheral edema. Skin: Skin is warm and dry. No rashes, erythema, diaphoresis. Psych:   Normal mood and affect.  Calm, cooperative     Labs:  Recent Labs   Lab  02/15/19   0514  02/16/19   0539  02/17/19   0524   RBC  2.82*  2.44*  2.45* dextrose, Glucose-Vitamin C, glucose, ondansetron HCl, morphINE sulfate (PF)

## 2019-02-18 NOTE — DISCHARGE SUMMARY
[facility] HOSPITALIST  DISCHARGE SUMMARY     Bianka Dumont Patient Status:  Inpatient    10/25/1932 MRN [id number]   Location [facility] 4W/SW/SE Attending Red Brand MD   Hosp Day # 6 PCP 1 Saima Aguilar MD     DATE OF ADMISSION: 2019  D Gastroneurology was consulted. Her hemoglobin did drop a small amount but there is no ongoing evidence of bleeding. Patient had upper endoscopy which showed esophagitis, small gastric ulcer that was not bleeding. She was placed on twice daily PPI.   She as needed. Refills:  0     Metoprolol Tartrate 50 MG Tabs  Commonly known as:  LOPRESSOR      TAKE 1 TABLET(50 MG) BY MOUTH TWICE DAILY   Quantity:  180 tablet  Refills:  0     Multi Vitamin/Minerals Tabs      Take by mouth daily.    Refills:  0     omepr

## 2019-02-18 NOTE — HOME CARE LIAISON
Met with patient at the bedside. Patient is agreeable to [facility]. Brochure and liaison's card provided with contact information. All questions addressed and answered.

## 2019-02-18 NOTE — PROGRESS NOTES
[facility]     Gastroenterology Progress Note    Luke Rolon Patient Status:  Inpatient    10/25/1932 MRN [id number]   Location [facility] 4W/SW/SE Attending Mike Nicholas MD   Hosp Day # 6 PCP Walker King MD cholesterol, diabetes, anemia, chronic osteoarthritis, prior tobacco use who was admitted 2/11 after presenting with 2-3 days of nausea and vomiting who had an NGT placed and evaluated by surgery for what was thought to be a partial small bowel obstruction

## 2019-02-18 NOTE — TELEPHONE ENCOUNTER
Kalina Eastman called in to inform MMP that pt has been recommended for a home nurse, OT and PT. Kalina Eastman stated no need for a call back.   Please be advised

## 2019-02-19 ENCOUNTER — PATIENT OUTREACH (OUTPATIENT)
Dept: CASE MANAGEMENT | Age: 84
End: 2019-02-19

## 2019-02-19 ENCOUNTER — TELEPHONE (OUTPATIENT)
Dept: GASTROENTEROLOGY | Facility: CLINIC | Age: 84
End: 2019-02-19

## 2019-02-19 DIAGNOSIS — Z02.9 ENCOUNTERS FOR ADMINISTRATIVE PURPOSE: ICD-10-CM

## 2019-02-19 NOTE — TELEPHONE ENCOUNTER
Dr Nestor Garcia,    Please advise on EGD done in the hospital    F/u appt made for the pt    Date, time and location verified with the pt's daughter    Future Appointments   Date Time Provider Napoleon Souza   3/6/2019  4:15 PM Anup Abarca MD [facility]

## 2019-02-19 NOTE — PROGRESS NOTES
Tere Quispe [phone] for post hospital follow up, [facility] contact information provided. TCM book by date 3-4-2019.

## 2019-02-19 NOTE — TELEPHONE ENCOUNTER
Zoë Hernandez requesting to sched hosp f/up appt. sooner then 1st avail., Zoë Hernandez states that the Dr wants to see pt right away.

## 2019-02-20 RX ORDER — OMEPRAZOLE 20 MG/1
20 CAPSULE, DELAYED RELEASE ORAL
Qty: 60 CAPSULE | Refills: 1 | OUTPATIENT
Start: 2019-02-20 | End: 2019-03-04

## 2019-02-20 RX ORDER — OMEPRAZOLE 20 MG/1
20 CAPSULE, DELAYED RELEASE ORAL
Qty: 90 CAPSULE | Refills: 1 | Status: SHIPPED | OUTPATIENT
Start: 2019-02-20 | End: 2019-02-20

## 2019-02-20 NOTE — TELEPHONE ENCOUNTER
Pt had small gastric ulcer and esophagitis likely induced by NG tube. I will see her in the office as scheduled. Please inform.  She should be on PPI shuch as omepraozle 20mg bid x 2 months Please inform

## 2019-02-20 NOTE — PAYOR COMM NOTE
--------------  DISCHARGE REVIEW    Payor: BCBS MEDICARE ADV PPO  Subscriber #:  [id number]  Authorization Number: [id number]    Admit date: 2/12/19  Admit time:  [facility]  Discharge Date: 2/18/2019  3:22 PM     Admitting Physician: Juliet Bryan MD now.    HOSPITAL COURSE:  Patient was admitted. Seen by general surgery. Imaging consistent with small bowel obstruction, given her history this was felt to be most likely due to adhesive disease.   The patient had slow recovery of bowel function but she CONTINUE taking these medications      Instructions Prescription details   AmLODIPine Besylate 10 MG Tabs  Commonly known as:  NORVASC      TAKE 1 TABLET(10 MG) BY MOUTH DAILY   Quantity:  90 tablet  Refills:  0     celecoxib 200 MG Caps  Commonly known agreement. They understand to return to the emergency room for any concerning signs or symptoms.   Greater than 30 minutes spent on discharge.  -----------------------    Hospital Discharge Diagnoses: Small bowel obstruction    Lace+ Score: 60  59-90 High

## 2019-02-20 NOTE — TELEPHONE ENCOUNTER
Spoke to [email or web address] clarified that  RX should be omepraozle 20mg bid x 2 months QTY 60 with 1 refill, updated RX in epic as well.

## 2019-02-22 RX ORDER — METOPROLOL TARTRATE 50 MG/1
TABLET, FILM COATED ORAL
Qty: 60 TABLET | Refills: 0 | Status: SHIPPED | OUTPATIENT
Start: 2019-02-22 | End: 2019-03-21

## 2019-02-22 NOTE — TELEPHONE ENCOUNTER
Pt's daughter returned call and scheduled an appt on 3/4 and states almost out of meds, 1 pill left and req a refill until this appt

## 2019-03-04 ENCOUNTER — OFFICE VISIT (OUTPATIENT)
Dept: INTERNAL MEDICINE CLINIC | Facility: CLINIC | Age: 84
End: 2019-03-04
Payer: MEDICARE

## 2019-03-04 VITALS
HEIGHT: 63 IN | DIASTOLIC BLOOD PRESSURE: 72 MMHG | SYSTOLIC BLOOD PRESSURE: 164 MMHG | HEART RATE: 59 BPM | BODY MASS INDEX: 24.92 KG/M2 | TEMPERATURE: 98 F | WEIGHT: 140.63 LBS

## 2019-03-04 DIAGNOSIS — D64.9 ANEMIA, UNSPECIFIED TYPE: ICD-10-CM

## 2019-03-04 DIAGNOSIS — I10 ESSENTIAL HYPERTENSION: Primary | ICD-10-CM

## 2019-03-04 DIAGNOSIS — N28.9 RENAL INSUFFICIENCY: ICD-10-CM

## 2019-03-04 DIAGNOSIS — E83.42 HYPOMAGNESEMIA: ICD-10-CM

## 2019-03-04 DIAGNOSIS — E11.9 TYPE 2 DIABETES MELLITUS WITHOUT COMPLICATION, WITHOUT LONG-TERM CURRENT USE OF INSULIN (HCC): ICD-10-CM

## 2019-03-04 DIAGNOSIS — E53.8 B12 DEFICIENCY: ICD-10-CM

## 2019-03-04 DIAGNOSIS — Z87.19 HISTORY OF SMALL BOWEL OBSTRUCTION: ICD-10-CM

## 2019-03-04 DIAGNOSIS — E55.9 VITAMIN D DEFICIENCY: ICD-10-CM

## 2019-03-04 PROBLEM — M25.512 CHRONIC PAIN OF BOTH SHOULDERS: Status: RESOLVED | Noted: 2017-10-13 | Resolved: 2019-03-04

## 2019-03-04 PROBLEM — G89.29 CHRONIC PAIN OF BOTH SHOULDERS: Status: RESOLVED | Noted: 2017-10-13 | Resolved: 2019-03-04

## 2019-03-04 PROBLEM — M25.511 CHRONIC PAIN OF BOTH SHOULDERS: Status: RESOLVED | Noted: 2017-10-13 | Resolved: 2019-03-04

## 2019-03-04 PROBLEM — K56.609 SMALL BOWEL OBSTRUCTION (HCC): Status: RESOLVED | Noted: 2019-02-12 | Resolved: 2019-03-04

## 2019-03-04 PROBLEM — E86.0 DEHYDRATION: Status: RESOLVED | Noted: 2019-02-12 | Resolved: 2019-03-04

## 2019-03-04 PROBLEM — K56.609 SBO (SMALL BOWEL OBSTRUCTION) (HCC): Status: RESOLVED | Noted: 2019-02-12 | Resolved: 2019-03-04

## 2019-03-04 PROCEDURE — 99495 TRANSJ CARE MGMT MOD F2F 14D: CPT | Performed by: INTERNAL MEDICINE

## 2019-03-04 RX ORDER — OMEPRAZOLE 40 MG/1
40 CAPSULE, DELAYED RELEASE ORAL DAILY
Qty: 90 CAPSULE | Refills: 3 | Status: SHIPPED | OUTPATIENT
Start: 2019-03-04 | End: 2020-01-22

## 2019-03-04 RX ORDER — BENAZEPRIL HYDROCHLORIDE 40 MG/1
40 TABLET, FILM COATED ORAL DAILY
Qty: 90 TABLET | Refills: 3 | Status: SHIPPED | OUTPATIENT
Start: 2019-03-04 | End: 2020-02-18

## 2019-03-04 NOTE — PROGRESS NOTES
Several attempts made to reach the patient with no return call. Patient completed HFU on 3/4/2019. Closing encounter.

## 2019-03-04 NOTE — PROGRESS NOTES
HPI:    Rosi Nayak is a 80year old female here today for hospital follow up.    She was discharged from Inpatient hospital, [facility]  to Home   Admission Date: 2/11/19   Discharge Date: 2/18/19  Hospital Discharge Diagnoses (since 2/2/2019) trauma, need for reinsertion after accidental removal.  Gastroneurology was consulted. Her hemoglobin did drop a small amount but there is no ongoing evidence of bleeding.   Patient had upper endoscopy which showed esophagitis, small gastric ulcer that was Loperamide HCl 2 MG Caps  Commonly known as:  IMODIUM       Take 2 mg by mouth 4 (four) times daily as needed.    Refills:  0      Metoprolol Tartrate 50 MG Tabs  Commonly known as:  LOPRESSOR       TAKE 1 TABLET(50 MG) BY MOUTH TWICE DAILY    Quantity: need for follow-up on pending tests, need for follow-up on diagnostic tests and need for follow-up on treatments  ? specialists already aware of assumption/resumption of care  ?  Education given to patient on self-management, independent living, and activit HCl 2 MG Oral Cap Take 2 mg by mouth 4 (four) times daily as needed. Glutamine 500 MG Oral Cap Take 2 tablets by mouth 2 (two) times daily. Multiple Vitamins-Minerals (MULTI VITAMIN/MINERALS) Oral Tab Take by mouth daily.      No current facility-admi pain, constipation and abdominal distention. Genitourinary: Negative for dysuria, frequency, hematuria and difficulty urinating. Musculoskeletal: Negative for joint swelling and joint pain. Skin: Negative for rash and wound.    Neurological: Negative normal. Thought content normal.     ASSESSMENT/ PLAN:   (I10) Essential hypertension  (primary encounter diagnosis)  Plan: BASIC METABOLIC PANEL (8)  Blood pressure is elevated she may resume her benazepril    (Z87.19) History of small bowel obstruction  P

## 2019-03-06 ENCOUNTER — OFFICE VISIT (OUTPATIENT)
Dept: GASTROENTEROLOGY | Facility: CLINIC | Age: 84
End: 2019-03-06
Payer: MEDICARE

## 2019-03-06 VITALS
HEART RATE: 78 BPM | BODY MASS INDEX: 24.8 KG/M2 | HEIGHT: 63 IN | SYSTOLIC BLOOD PRESSURE: 136 MMHG | WEIGHT: 140 LBS | DIASTOLIC BLOOD PRESSURE: 61 MMHG

## 2019-03-06 DIAGNOSIS — K21.00 REFLUX ESOPHAGITIS: Primary | ICD-10-CM

## 2019-03-06 DIAGNOSIS — Z87.11 HISTORY OF GASTRIC ULCER: ICD-10-CM

## 2019-03-06 DIAGNOSIS — D64.9 ACUTE ON CHRONIC ANEMIA: ICD-10-CM

## 2019-03-06 PROCEDURE — 99214 OFFICE O/P EST MOD 30 MIN: CPT | Performed by: INTERNAL MEDICINE

## 2019-03-06 NOTE — PATIENT INSTRUCTIONS
1. Get blood work as ordered by Dr. Fifi Masters  2. Continue omeprazole 40mg daily before breakfast  3. Watch for GI bleeding, N/V , black tarry stool. Go to ER if that recurs  4.  No NSAIDs or Celebrex

## 2019-03-06 NOTE — PROGRESS NOTES
HPI:    Patient ID: Fabiano Costa is a 80year old female. Gastroenterology follow-up visit    History of present illness: This is an 44-year-old female who I recently saw while she was hospitalized with an upper GI bleed.   She had been hospitalized as per medical record    Past surgical history: Reviewed, as per medical record    Social history: Reviewed, as per medical record    Family history: Reviewed, as per medical record    Allergies: No known drug allergies, reviewed as per medical record    R palpitations and leg swelling. Gastrointestinal: Negative for abdominal distention, abdominal pain, anal bleeding, blood in stool, constipation, diarrhea, nausea, rectal pain and vomiting. Endocrine: Negative for cold intolerance and heat intolerance. well-developed. No distress. Somewhat frail-appearing female but alert and oriented, intelligent, in no acute distress. Ambulates with walker effectively. HENT:   Head: Normocephalic and atraumatic.    Mouth/Throat: Oropharynx is clear and moist. No or encounter       Imaging & Referrals:  None       YX#[id number]

## 2019-03-08 RX ORDER — AMLODIPINE BESYLATE 10 MG/1
TABLET ORAL
Qty: 90 TABLET | Refills: 0 | Status: SHIPPED | OUTPATIENT
Start: 2019-03-08 | End: 2019-06-06

## 2019-03-08 RX ORDER — DULOXETIN HYDROCHLORIDE 60 MG/1
CAPSULE, DELAYED RELEASE ORAL
Qty: 30 CAPSULE | Refills: 0 | Status: SHIPPED | OUTPATIENT
Start: 2019-03-08 | End: 2019-04-07

## 2019-03-08 NOTE — TELEPHONE ENCOUNTER
Duloxetine 60mg. Take 1 cap daily #30. No refills.     Last filled-11/9/2018      LOV-10/11/2018  NOV-none

## 2019-03-12 NOTE — TELEPHONE ENCOUNTER
No Protocol on this med. Controlled medication pending for review. If approved needs to be called in or faxed by on-site staff.       Requested Prescriptions     Pending Prescriptions Disp Refills   • TRAMADOL-ACETAMINOPHEN 37.5-325 MG Oral Tab Prudy Plater

## 2019-03-21 ENCOUNTER — LAB ENCOUNTER (OUTPATIENT)
Dept: LAB | Age: 84
End: 2019-03-21
Attending: INTERNAL MEDICINE
Payer: MEDICARE

## 2019-03-21 DIAGNOSIS — E83.42 HYPOMAGNESEMIA: ICD-10-CM

## 2019-03-21 DIAGNOSIS — I10 ESSENTIAL HYPERTENSION: ICD-10-CM

## 2019-03-21 DIAGNOSIS — D64.9 ANEMIA, UNSPECIFIED TYPE: ICD-10-CM

## 2019-03-21 DIAGNOSIS — E78.5 HYPERLIPIDEMIA, UNSPECIFIED HYPERLIPIDEMIA TYPE: ICD-10-CM

## 2019-03-21 DIAGNOSIS — E11.9 TYPE 2 DIABETES MELLITUS WITHOUT COMPLICATION, WITHOUT LONG-TERM CURRENT USE OF INSULIN (HCC): ICD-10-CM

## 2019-03-21 DIAGNOSIS — E55.9 VITAMIN D DEFICIENCY: ICD-10-CM

## 2019-03-21 LAB
ALBUMIN SERPL-MCNC: 3.3 G/DL (ref 3.4–5)
ALBUMIN/GLOB SERPL: 0.9 {RATIO} (ref 1–2)
ALP LIVER SERPL-CCNC: 104 U/L (ref 55–142)
ALT SERPL-CCNC: 15 U/L (ref 13–56)
ANION GAP SERPL CALC-SCNC: 4 MMOL/L (ref 0–18)
AST SERPL-CCNC: 13 U/L (ref 15–37)
BILIRUB SERPL-MCNC: 0.4 MG/DL (ref 0.1–2)
BUN BLD-MCNC: 15 MG/DL (ref 7–18)
BUN/CREAT SERPL: 19.5 (ref 10–20)
CALCIUM BLD-MCNC: 9 MG/DL (ref 8.5–10.1)
CHLORIDE SERPL-SCNC: 104 MMOL/L (ref 98–107)
CHOLEST SMN-MCNC: 174 MG/DL (ref ?–200)
CO2 SERPL-SCNC: 30 MMOL/L (ref 21–32)
CREAT BLD-MCNC: 0.77 MG/DL (ref 0.55–1.02)
DEPRECATED RDW RBC AUTO: 48.1 FL (ref 35.1–46.3)
ERYTHROCYTE [DISTWIDTH] IN BLOOD BY AUTOMATED COUNT: 13.5 % (ref 11–15)
GLOBULIN PLAS-MCNC: 3.6 G/DL (ref 2.8–4.4)
GLUCOSE BLD-MCNC: 137 MG/DL (ref 70–99)
HAV IGM SER QL: 1.3 MG/DL (ref 1.6–2.6)
HCT VFR BLD AUTO: 28.8 % (ref 35–48)
HDLC SERPL-MCNC: 54 MG/DL (ref 40–59)
HGB BLD-MCNC: 8.9 G/DL (ref 12–16)
LDLC SERPL CALC-MCNC: 86 MG/DL (ref ?–100)
M PROTEIN MFR SERPL ELPH: 6.9 G/DL (ref 6.4–8.2)
MCH RBC QN AUTO: 29.7 PG (ref 26–34)
MCHC RBC AUTO-ENTMCNC: 30.9 G/DL (ref 31–37)
MCV RBC AUTO: 96 FL (ref 80–100)
NONHDLC SERPL-MCNC: 120 MG/DL (ref ?–130)
OSMOLALITY SERPL CALC.SUM OF ELEC: 289 MOSM/KG (ref 275–295)
PLATELET # BLD AUTO: 354 10(3)UL (ref 150–450)
POTASSIUM SERPL-SCNC: 4.3 MMOL/L (ref 3.5–5.1)
RBC # BLD AUTO: 3 X10(6)UL (ref 3.8–5.3)
SODIUM SERPL-SCNC: 138 MMOL/L (ref 136–145)
T4 FREE SERPL-MCNC: 1 NG/DL (ref 0.8–1.7)
TRIGL SERPL-MCNC: 170 MG/DL (ref 30–149)
TSI SER-ACNC: 1.88 MIU/ML (ref 0.36–3.74)
VLDLC SERPL CALC-MCNC: 34 MG/DL (ref 0–30)
WBC # BLD AUTO: 5.8 X10(3) UL (ref 4–11)

## 2019-03-21 PROCEDURE — 84439 ASSAY OF FREE THYROXINE: CPT

## 2019-03-21 PROCEDURE — 82306 VITAMIN D 25 HYDROXY: CPT

## 2019-03-21 PROCEDURE — 36415 COLL VENOUS BLD VENIPUNCTURE: CPT

## 2019-03-21 PROCEDURE — 84443 ASSAY THYROID STIM HORMONE: CPT

## 2019-03-21 PROCEDURE — 85027 COMPLETE CBC AUTOMATED: CPT

## 2019-03-21 PROCEDURE — 80061 LIPID PANEL: CPT

## 2019-03-21 PROCEDURE — 80053 COMPREHEN METABOLIC PANEL: CPT

## 2019-03-21 PROCEDURE — 83735 ASSAY OF MAGNESIUM: CPT

## 2019-03-22 LAB — 25(OH)D3 SERPL-MCNC: 22.4 NG/ML (ref 30–100)

## 2019-03-22 RX ORDER — METOPROLOL TARTRATE 50 MG/1
TABLET, FILM COATED ORAL
Qty: 60 TABLET | Refills: 11 | Status: SHIPPED | OUTPATIENT
Start: 2019-03-22 | End: 2020-05-11

## 2019-04-08 ENCOUNTER — MED REC SCAN ONLY (OUTPATIENT)
Dept: INTERNAL MEDICINE CLINIC | Facility: CLINIC | Age: 84
End: 2019-04-08

## 2019-04-08 NOTE — TELEPHONE ENCOUNTER
Refill request for duloxetine 60 mg, take 1 cap daily, #30, no refills    LOV: 10/11/18  NOV: none  Last refilled on 3/8/19

## 2019-04-09 RX ORDER — DULOXETIN HYDROCHLORIDE 60 MG/1
CAPSULE, DELAYED RELEASE ORAL
Qty: 30 CAPSULE | Refills: 0 | Status: SHIPPED | OUTPATIENT
Start: 2019-04-09 | End: 2019-05-07

## 2019-04-19 ENCOUNTER — TELEPHONE (OUTPATIENT)
Dept: GASTROENTEROLOGY | Facility: CLINIC | Age: 84
End: 2019-04-19

## 2019-04-19 NOTE — TELEPHONE ENCOUNTER
[facility]Pharm calling for pt regarding rx: Omeprazole 20 MG, other provider sent different 40 MG, needs to clarfiy which dosage pts was taking, pls call at:[phone],thanks.

## 2019-04-22 NOTE — TELEPHONE ENCOUNTER
Pt was just seen by Dr Letitia Toro 03/06/19. She prescribed Omeprazole daily 40 mg before breakfast. I clarified this with Maco Jackson, registered pharmacist at [facility]

## 2019-05-07 RX ORDER — DULOXETIN HYDROCHLORIDE 60 MG/1
CAPSULE, DELAYED RELEASE ORAL
Qty: 30 CAPSULE | Refills: 0 | Status: ON HOLD | OUTPATIENT
Start: 2019-05-07 | End: 2020-03-27

## 2019-05-07 NOTE — TELEPHONE ENCOUNTER
Duloxetine 60mg. Take 1 cap daily. #30.  No refills    Last filled-10/11/2018    LOV-10/11/2018  NOV- none

## 2019-05-16 RX ORDER — DULOXETIN HYDROCHLORIDE 60 MG/1
CAPSULE, DELAYED RELEASE ORAL
Qty: 30 CAPSULE | Refills: 0 | OUTPATIENT
Start: 2019-05-16

## 2019-05-17 NOTE — TELEPHONE ENCOUNTER
Controlled medication pending for review. If approved needs to be called in or faxed by on-site staff. Requested Prescriptions     Pending Prescriptions Disp Refills   • TRAMADOL-ACETAMINOPHEN 37.5-325 MG Oral Tab [Pharmacy Med Name: TRAMADOL/APAP 37. 5

## 2019-06-07 RX ORDER — BENAZEPRIL HYDROCHLORIDE 40 MG/1
TABLET, FILM COATED ORAL
Qty: 90 TABLET | Refills: 1 | Status: SHIPPED | OUTPATIENT
Start: 2019-06-07 | End: 2020-01-31

## 2019-06-07 RX ORDER — AMLODIPINE BESYLATE 10 MG/1
TABLET ORAL
Qty: 90 TABLET | Refills: 1 | Status: SHIPPED | OUTPATIENT
Start: 2019-06-07 | End: 2019-09-08

## 2019-06-07 NOTE — TELEPHONE ENCOUNTER
Refill passed per [facility] protocol.   Hypertensive Medications  Protocol Criteria:  · Appointment scheduled in the past 6 months or in the next 3 months  · BMP or CMP in the past 12 months  · Creatinine result < 2  Recent Outpatient Visits

## 2019-06-24 RX ORDER — ERGOCALCIFEROL 1.25 MG/1
50000 CAPSULE ORAL
Qty: 3 CAPSULE | Refills: 3 | Status: ON HOLD | OUTPATIENT
Start: 2019-06-24 | End: 2020-03-27

## 2019-06-24 NOTE — TELEPHONE ENCOUNTER
Review pended refill request as it does not fall under a protocol. Last Rx: 05/20  LOV: 03/04/19.    according to lab note Vitamin D is low  Please call patient and give Vit D 50,000 units once a week for 12 wks   Send patient and order for Vit D level i

## 2019-06-24 NOTE — TELEPHONE ENCOUNTER
Requested Prescriptions     Pending Prescriptions Disp Refills   • TRAMADOL-ACETAMINOPHEN 37.5-325 MG Oral Tab [Pharmacy Med Name: TRAMADOL/APAP 37. 5MG/325MG TABS] 90 tablet 1     Sig: TAKE 1 TABLET BY MOUTH EVERY 8 HOURS AS NEEDED FOR PAIN   • ERGOCALCIFE

## 2019-07-01 NOTE — TELEPHONE ENCOUNTER
Called [facility] pharmacy and left voicemail with rx approval as authorized by Dr. Xu Lowry for Tramadol 37.5 #30

## 2019-07-31 NOTE — TELEPHONE ENCOUNTER
Controlled medication pending for review. If approved needs to be called in or faxed by on-site staff.     Last Rx: 7/1/2019  LOV: 3/4/19    Please respond to pool: HERCALIO [facility] LPN/CHEKO

## 2019-08-28 NOTE — TELEPHONE ENCOUNTER
Controlled medications pending for review. If approved needs to be called in or faxed by on site staff.     Last Rx: 7-31-19 # 90  LOV: 3-4-19    Requested Prescriptions     Pending Prescriptions Disp Refills   • TRAMADOL-ACETAMINOPHEN 37.5-325 MG Oral Tab

## 2019-08-29 NOTE — TELEPHONE ENCOUNTER
Rx script for Tramadol-Acetaminophen 37.5-325 called in to [address] [place] [address] #[id number] as approved by Dr. Hortencia Aldana.

## 2019-09-09 NOTE — TELEPHONE ENCOUNTER
Please review; protocol failed.     Requested Prescriptions     Pending Prescriptions Disp Refills   • AMLODIPINE BESYLATE 10 MG Oral Tab [Pharmacy Med Name: AMLODIPINE BESYLATE 10MG TABLETS] 90 tablet 0     Sig: TAKE 1 TABLET(10 MG) BY MOUTH DAILY

## 2019-09-11 RX ORDER — AMLODIPINE BESYLATE 10 MG/1
TABLET ORAL
Qty: 90 TABLET | Refills: 1 | Status: SHIPPED | OUTPATIENT
Start: 2019-09-11 | End: 2020-02-01

## 2019-09-28 NOTE — TELEPHONE ENCOUNTER
Controlled medication pending for review. Please change to phone in, fax, or print script if not being sent electronically.     Last Rx: 8-29-19 # 90  LOV:  3-4-19    Requested Prescriptions     Pending Prescriptions Disp Refills   • TRAMADOL-ACETAMINOPHEN

## 2019-11-27 NOTE — TELEPHONE ENCOUNTER
TRAMADOL-ACETAMINOPHEN 37.5-325 MG Oral Tab Called in to [address] , left  for the pharmacist .

## 2019-11-27 NOTE — TELEPHONE ENCOUNTER
Controlled medication pending for review. If approved needs to be called in or faxed by on-site staff.      Last Rx: 10/28/19  LOV: 8 months ago

## 2019-12-24 NOTE — TELEPHONE ENCOUNTER
Dr. Kevin Motley, please see pended scripts, per pt she has very bad arthritis and has been taking 2 Tramadol/acetaminophen TID to QID for years. She is requesting the the prescription be changed to reflect that dose.  She usually takes 2 tabs only TID but occas no

## 2020-01-22 RX ORDER — OMEPRAZOLE 40 MG/1
CAPSULE, DELAYED RELEASE ORAL
Qty: 90 CAPSULE | Refills: 3 | Status: SHIPPED | OUTPATIENT
Start: 2020-01-22 | End: 2021-04-21

## 2020-01-31 RX ORDER — BENAZEPRIL HYDROCHLORIDE 40 MG/1
TABLET, FILM COATED ORAL
Qty: 90 TABLET | Refills: 0 | Status: SHIPPED | OUTPATIENT
Start: 2020-01-31 | End: 2020-05-01

## 2020-01-31 NOTE — TELEPHONE ENCOUNTER
Refill passed per [address] [facility] Protocol. [facility] message sent to pt to schedule appt before next refill is due.     Hypertensive Medications  Protocol Criteria:  · Appointment scheduled in the past 6 months or in the next 3 months  · BMP or CMP in the

## 2020-02-01 RX ORDER — AMLODIPINE BESYLATE 10 MG/1
TABLET ORAL
Qty: 90 TABLET | Refills: 0 | Status: SHIPPED | OUTPATIENT
Start: 2020-02-01 | End: 2020-05-01

## 2020-02-06 ENCOUNTER — TELEPHONE (OUTPATIENT)
Dept: INTERNAL MEDICINE CLINIC | Facility: CLINIC | Age: 85
End: 2020-02-06

## 2020-02-06 NOTE — TELEPHONE ENCOUNTER
Current Outpatient Medications:   •  TRAMADOL-ACETAMINOPHEN 37.5-325 MG Oral Tab, TAKE ONE TABLET BY MOUTH EVERY 8 HOURS AS NEEDED FOR PAIN, Disp: 90 tablet, Rfl: 0

## 2020-02-07 NOTE — TELEPHONE ENCOUNTER
See TE 01/21 medication phoned in to pharmacy refill too soon     TRAMADOL-ACETAMINOPHEN 37.5-325 MG Oral Tab 90 tablet 0 1/22/2020     Sig: TAKE ONE TABLET BY MOUTH EVERY 8 HOURS AS NEEDED FOR PAIN    Class: Phone in    [id number] [facility] #

## 2020-02-17 ENCOUNTER — TELEPHONE (OUTPATIENT)
Dept: INTERNAL MEDICINE CLINIC | Facility: CLINIC | Age: 85
End: 2020-02-17

## 2020-02-17 NOTE — TELEPHONE ENCOUNTER
Dr Vida Bautista- see message below and advise. Controlled medication pending for review. Please change to phone in, fax, or print script if not being sent electronically.     Last Rx: 01/22/20  LOV: 03/04/19

## 2020-02-17 NOTE — TELEPHONE ENCOUNTER
Sorry about spelling      Pharmacy notes current prescription for Pt has no refills remaining and is about to . If you want to continue therapy for Pt, as indicated, please prepare and fax a valid siigned written script to the pharm.       Current

## 2020-02-18 ENCOUNTER — OFFICE VISIT (OUTPATIENT)
Dept: INTERNAL MEDICINE CLINIC | Facility: CLINIC | Age: 85
End: 2020-02-18
Payer: MEDICARE

## 2020-02-18 DIAGNOSIS — M15.9 PRIMARY OSTEOARTHRITIS INVOLVING MULTIPLE JOINTS: ICD-10-CM

## 2020-02-18 DIAGNOSIS — I10 ESSENTIAL HYPERTENSION: Primary | ICD-10-CM

## 2020-02-18 DIAGNOSIS — E53.8 VITAMIN B12 DEFICIENCY: ICD-10-CM

## 2020-02-18 DIAGNOSIS — E55.9 VITAMIN D DEFICIENCY: ICD-10-CM

## 2020-02-18 DIAGNOSIS — E11.9 TYPE 2 DIABETES MELLITUS WITHOUT COMPLICATION, WITHOUT LONG-TERM CURRENT USE OF INSULIN (HCC): ICD-10-CM

## 2020-02-18 DIAGNOSIS — E78.5 HYPERLIPIDEMIA, UNSPECIFIED HYPERLIPIDEMIA TYPE: ICD-10-CM

## 2020-02-18 PROCEDURE — 99214 OFFICE O/P EST MOD 30 MIN: CPT | Performed by: INTERNAL MEDICINE

## 2020-02-18 NOTE — TELEPHONE ENCOUNTER
Phone in for 1 month  Pt overdue for office visit  PLEASE INFORM PATIENT, OFFICE VISIT REQUIRED PRIOR TO ANY ADDITIONAL REFILLS

## 2020-02-18 NOTE — TELEPHONE ENCOUNTER
Patient had appt with Ming Howell today, it is not clear if printed RX was given to patient or not      [email or web address], spoke with Thais Tony if [facility] has a printed RX from today's  [facility] Marrero does not have the printed Aron Bashir ca

## 2020-03-02 VITALS
TEMPERATURE: 98 F | SYSTOLIC BLOOD PRESSURE: 138 MMHG | HEIGHT: 63 IN | DIASTOLIC BLOOD PRESSURE: 64 MMHG | HEART RATE: 73 BPM | BODY MASS INDEX: 26.22 KG/M2 | WEIGHT: 148 LBS

## 2020-03-02 NOTE — PROGRESS NOTES
HPI:    Patient ID: Fantasam Tsai is a 80year old female.     HPI    HTN  Long standing history of hypertension     sympotms  :        Headache no  dizziness        no                             Blurred vision no  palpitaionsSyncope no  Chest pain  no  PN Sig Dispense Refill   • TURMERIC OR Take by mouth. • MELATONIN OR Take by mouth.      • tramadol-acetaminophen 37.5-325 MG Oral Tab TAKE ONE TABLET BY MOUTH EVERY 8 HOURS AS NEEDED FOR PAIN 90 tablet 0   • AMLODIPINE BESYLATE 10 MG Oral Tab TAKE 1 TABLE due to age also egd then but needs them intermittently.    • ESOPHAGOGASTRODUODENOSCOPY (EGD) N/A 2/15/2019    Performed by Jessica Mac MD at [facility] [address]    umbilical hernia    • [address] edema.   Lymphadenopathy:     She has no cervical adenopathy. Neurological: She is alert. Skin: No rash noted. She is not diaphoretic. No erythema. Nursing note and vitals reviewed.            ASSESSMENT/PLAN:   (I10) Essential hypertension  (primary

## 2020-03-04 ENCOUNTER — APPOINTMENT (OUTPATIENT)
Dept: LAB | Age: 85
End: 2020-03-04
Attending: INTERNAL MEDICINE
Payer: MEDICARE

## 2020-03-04 DIAGNOSIS — E53.8 VITAMIN B12 DEFICIENCY: ICD-10-CM

## 2020-03-04 DIAGNOSIS — E55.9 VITAMIN D DEFICIENCY: ICD-10-CM

## 2020-03-04 DIAGNOSIS — I10 ESSENTIAL HYPERTENSION: ICD-10-CM

## 2020-03-04 DIAGNOSIS — E78.5 HYPERLIPIDEMIA, UNSPECIFIED HYPERLIPIDEMIA TYPE: ICD-10-CM

## 2020-03-04 DIAGNOSIS — E11.9 TYPE 2 DIABETES MELLITUS WITHOUT COMPLICATION, WITHOUT LONG-TERM CURRENT USE OF INSULIN (HCC): ICD-10-CM

## 2020-03-04 LAB
ALBUMIN SERPL-MCNC: 3.5 G/DL (ref 3.4–5)
ALBUMIN/GLOB SERPL: 0.9 {RATIO} (ref 1–2)
ALP LIVER SERPL-CCNC: 105 U/L (ref 55–142)
ALT SERPL-CCNC: 18 U/L (ref 13–56)
ANION GAP SERPL CALC-SCNC: 7 MMOL/L (ref 0–18)
AST SERPL-CCNC: 15 U/L (ref 15–37)
BACTERIA UR QL AUTO: NEGATIVE /HPF
BILIRUB SERPL-MCNC: 0.3 MG/DL (ref 0.1–2)
BUN BLD-MCNC: 19 MG/DL (ref 7–18)
BUN/CREAT SERPL: 21.6 (ref 10–20)
CALCIUM BLD-MCNC: 8.5 MG/DL (ref 8.5–10.1)
CHLORIDE SERPL-SCNC: 112 MMOL/L (ref 98–112)
CO2 SERPL-SCNC: 24 MMOL/L (ref 21–32)
CREAT BLD-MCNC: 0.88 MG/DL (ref 0.55–1.02)
DEPRECATED RDW RBC AUTO: 49.9 FL (ref 35.1–46.3)
ERYTHROCYTE [DISTWIDTH] IN BLOOD BY AUTOMATED COUNT: 16.6 % (ref 11–15)
FOLATE SERPL-MCNC: >20 NG/ML (ref 8.7–?)
GLOBULIN PLAS-MCNC: 3.7 G/DL (ref 2.8–4.4)
GLUCOSE BLD-MCNC: 161 MG/DL (ref 70–99)
HCT VFR BLD AUTO: 23.7 % (ref 35–48)
HGB BLD-MCNC: 7.2 G/DL (ref 12–16)
HYALINE CASTS #/AREA URNS AUTO: 3 /LPF
M PROTEIN MFR SERPL ELPH: 7.2 G/DL (ref 6.4–8.2)
MCH RBC QN AUTO: 25.1 PG (ref 26–34)
MCHC RBC AUTO-ENTMCNC: 30.4 G/DL (ref 31–37)
MCV RBC AUTO: 82.6 FL (ref 80–100)
OSMOLALITY SERPL CALC.SUM OF ELEC: 302 MOSM/KG (ref 275–295)
PATIENT FASTING Y/N/NP: NO
PLATELET # BLD AUTO: 371 10(3)UL (ref 150–450)
POTASSIUM SERPL-SCNC: 4.6 MMOL/L (ref 3.5–5.1)
RBC # BLD AUTO: 2.87 X10(6)UL (ref 3.8–5.3)
RBC #/AREA URNS AUTO: 1 /HPF
SODIUM SERPL-SCNC: 143 MMOL/L (ref 136–145)
T4 FREE SERPL-MCNC: 0.9 NG/DL (ref 0.8–1.7)
TSI SER-ACNC: 2.01 MIU/ML (ref 0.36–3.74)
VIT B12 SERPL-MCNC: 163 PG/ML (ref 193–986)
WBC # BLD AUTO: 6.5 X10(3) UL (ref 4–11)
WBC #/AREA URNS AUTO: 1 /HPF

## 2020-03-04 PROCEDURE — 36415 COLL VENOUS BLD VENIPUNCTURE: CPT

## 2020-03-04 PROCEDURE — 82746 ASSAY OF FOLIC ACID SERUM: CPT

## 2020-03-04 PROCEDURE — 84439 ASSAY OF FREE THYROXINE: CPT

## 2020-03-04 PROCEDURE — 82607 VITAMIN B-12: CPT

## 2020-03-04 PROCEDURE — 82306 VITAMIN D 25 HYDROXY: CPT

## 2020-03-04 PROCEDURE — 81015 MICROSCOPIC EXAM OF URINE: CPT

## 2020-03-04 PROCEDURE — 80053 COMPREHEN METABOLIC PANEL: CPT

## 2020-03-04 PROCEDURE — 83036 HEMOGLOBIN GLYCOSYLATED A1C: CPT

## 2020-03-04 PROCEDURE — 84443 ASSAY THYROID STIM HORMONE: CPT

## 2020-03-04 PROCEDURE — 85027 COMPLETE CBC AUTOMATED: CPT

## 2020-03-05 LAB
EST. AVERAGE GLUCOSE BLD GHB EST-MCNC: 151 MG/DL (ref 68–126)
HBA1C MFR BLD HPLC: 6.9 % (ref ?–5.7)

## 2020-03-06 LAB — 25(OH)D3 SERPL-MCNC: 22.5 NG/ML (ref 30–100)

## 2020-03-07 DIAGNOSIS — E53.8 VITAMIN B12 DEFICIENCY: Primary | ICD-10-CM

## 2020-03-07 DIAGNOSIS — E55.9 VITAMIN D DEFICIENCY: ICD-10-CM

## 2020-03-09 ENCOUNTER — OFFICE VISIT (OUTPATIENT)
Dept: RHEUMATOLOGY | Facility: CLINIC | Age: 85
End: 2020-03-09
Payer: MEDICARE

## 2020-03-09 VITALS
DIASTOLIC BLOOD PRESSURE: 50 MMHG | WEIGHT: 145 LBS | HEART RATE: 76 BPM | SYSTOLIC BLOOD PRESSURE: 126 MMHG | BODY MASS INDEX: 25.69 KG/M2 | HEIGHT: 63 IN

## 2020-03-09 DIAGNOSIS — M25.562 CHRONIC PAIN OF LEFT KNEE: ICD-10-CM

## 2020-03-09 DIAGNOSIS — G89.29 CHRONIC PAIN OF LEFT KNEE: ICD-10-CM

## 2020-03-09 DIAGNOSIS — M17.0 BILATERAL PRIMARY OSTEOARTHRITIS OF KNEE: ICD-10-CM

## 2020-03-09 DIAGNOSIS — M15.9 GENERALIZED OSTEOARTHRITIS: Primary | ICD-10-CM

## 2020-03-09 LAB
HCT VFR BLD CALC: 25.2 %
HGB BLD-MCNC: 7.8 G/DL
PLATELET # BLD: 293 10*3/UL
RBC # BLD: 3.15 10*6/UL
WBC # BLD: 5.8 10*3/UL

## 2020-03-09 PROCEDURE — L3908 WHO COCK-UP NONMOLDE PRE OTS: HCPCS | Performed by: INTERNAL MEDICINE

## 2020-03-09 PROCEDURE — 20610 DRAIN/INJ JOINT/BURSA W/O US: CPT | Performed by: INTERNAL MEDICINE

## 2020-03-09 PROCEDURE — 99204 OFFICE O/P NEW MOD 45 MIN: CPT | Performed by: INTERNAL MEDICINE

## 2020-03-09 RX ORDER — TRIAMCINOLONE ACETONIDE 40 MG/ML
40 INJECTION, SUSPENSION INTRA-ARTICULAR; INTRAMUSCULAR ONCE
Status: COMPLETED | OUTPATIENT
Start: 2020-03-09 | End: 2020-03-09

## 2020-03-09 RX ORDER — TRAMADOL HYDROCHLORIDE 50 MG/1
50 TABLET ORAL EVERY 8 HOURS PRN
Qty: 90 TABLET | Refills: 2 | Status: SHIPPED | OUTPATIENT
Start: 2020-03-09 | End: 2020-07-06

## 2020-03-09 RX ADMIN — TRIAMCINOLONE ACETONIDE 40 MG: 40 INJECTION, SUSPENSION INTRA-ARTICULAR; INTRAMUSCULAR at 14:55:00

## 2020-03-09 NOTE — PATIENT INSTRUCTIONS
You were seen for osteoarthritis  We injected your left knee with cortisone  Plan to discontinue tramadol/acetaminophen and start tramadol 50 mg every 8 hours as needed  Come back as needed for cortisone injections

## 2020-03-09 NOTE — PROGRESS NOTES
Matt Kim is a 80year old female who presents for No chief complaint on file. Chad Covington    HPI:   CC: joint pain  Consult: referred by PCP Dr. Purvi Xiao    This is a 79 yo F with hx of HTN, GERD, Uterine Cancer s/p hysterectomy, Vitamin D deficiency referred by TAKE 1 TABLET(40 MG) BY MOUTH DAILY 90 tablet 0   • OMEPRAZOLE 40 MG Oral Capsule Delayed Release TAKE 1 CAPSULE(40 MG) BY MOUTH DAILY 90 capsule 3   • ergocalciferol 89720 units Oral Cap Take 1 capsule (50,000 Units total) by mouth every 30 (thirty) days. RADIATION THERAPY MANAGEMENT        Family History   Problem Relation Age of Onset   • Heart Disorder Father         mi at 59   • Hypertension Mother         cva at 80      Social History:  Social History    Tobacco Use      Smoking status: Former Smoker pain or swelling or warmth on palpation  Elbow: FROM, no pain or swelling or warmth on palpation  Shoulders: Flexion limited to 90 degrees bilaterally  Hip: normal log roll, no lateral hip pain, ELSIE test negative b/l  Knees: Hypertrophy of both knees, no 22.5 (L)     IMAGING:     XR L spine 2018:  CONCLUSION:   1. Moderate S-shaped scoliosis dorsolumbar spine and superimposed advanced multilevel spondylosis.   2. Degenerative retrolisthesis T12 relative to L1 and L1 relative to L2.  3. Chronic wedge dorina Ambrose Watkins MD  3/9/2020  2:09 PM

## 2020-03-12 ENCOUNTER — TELEPHONE (OUTPATIENT)
Dept: INTERNAL MEDICINE CLINIC | Facility: CLINIC | Age: 85
End: 2020-03-12

## 2020-03-13 ENCOUNTER — TELEPHONE (OUTPATIENT)
Dept: RHEUMATOLOGY | Facility: CLINIC | Age: 85
End: 2020-03-13

## 2020-03-13 NOTE — TELEPHONE ENCOUNTER
Called and left a VM for patient to call back. Per provider and health safety precautions recommendation advised to reschedule patients 61 yrs of age and older between 3 weeks- 1 month from scheduled appointment.

## 2020-03-27 ENCOUNTER — APPOINTMENT (OUTPATIENT)
Dept: GENERAL RADIOLOGY | Facility: HOSPITAL | Age: 85
DRG: 291 | End: 2020-03-27
Payer: MEDICARE

## 2020-03-27 ENCOUNTER — HOSPITAL ENCOUNTER (INPATIENT)
Facility: HOSPITAL | Age: 85
LOS: 2 days | Discharge: HOME | DRG: 291 | End: 2020-03-29
Attending: EMERGENCY MEDICINE | Admitting: HOSPITALIST
Payer: MEDICARE

## 2020-03-27 DIAGNOSIS — R06.02 SHORTNESS OF BREATH: Primary | ICD-10-CM

## 2020-03-27 PROCEDURE — 99223 1ST HOSP IP/OBS HIGH 75: CPT | Performed by: HOSPITALIST

## 2020-03-27 PROCEDURE — 71045 X-RAY EXAM CHEST 1 VIEW: CPT

## 2020-03-27 RX ORDER — SODIUM CHLORIDE 9 MG/ML
INJECTION, SOLUTION INTRAVENOUS ONCE
Status: COMPLETED | OUTPATIENT
Start: 2020-03-27 | End: 2020-03-27

## 2020-03-27 RX ORDER — SODIUM CHLORIDE 0.9 % (FLUSH) 0.9 %
3 SYRINGE (ML) INJECTION AS NEEDED
Status: DISCONTINUED | OUTPATIENT
Start: 2020-03-27 | End: 2020-03-29

## 2020-03-27 RX ORDER — LISINOPRIL 40 MG/1
40 TABLET ORAL DAILY
Status: DISCONTINUED | OUTPATIENT
Start: 2020-03-28 | End: 2020-03-29

## 2020-03-27 RX ORDER — MAGNESIUM OXIDE 400 MG (241.3 MG MAGNESIUM) TABLET
400 TABLET DAILY
Status: DISCONTINUED | OUTPATIENT
Start: 2020-03-28 | End: 2020-03-29

## 2020-03-27 RX ORDER — FUROSEMIDE 10 MG/ML
40 INJECTION INTRAMUSCULAR; INTRAVENOUS 2 TIMES DAILY
Status: DISCONTINUED | OUTPATIENT
Start: 2020-03-27 | End: 2020-03-29

## 2020-03-27 RX ORDER — ONDANSETRON 2 MG/ML
4 INJECTION INTRAMUSCULAR; INTRAVENOUS EVERY 6 HOURS PRN
Status: DISCONTINUED | OUTPATIENT
Start: 2020-03-27 | End: 2020-03-29

## 2020-03-27 RX ORDER — HEPARIN SODIUM 5000 [USP'U]/ML
5000 INJECTION, SOLUTION INTRAVENOUS; SUBCUTANEOUS EVERY 12 HOURS SCHEDULED
Status: DISCONTINUED | OUTPATIENT
Start: 2020-03-27 | End: 2020-03-29

## 2020-03-27 RX ORDER — NITROGLYCERIN 0.4 MG/1
0.4 TABLET SUBLINGUAL EVERY 5 MIN PRN
Status: DISCONTINUED | OUTPATIENT
Start: 2020-03-27 | End: 2020-03-29

## 2020-03-27 RX ORDER — DEXTROSE MONOHYDRATE 25 G/50ML
50 INJECTION, SOLUTION INTRAVENOUS
Status: DISCONTINUED | OUTPATIENT
Start: 2020-03-27 | End: 2020-03-29

## 2020-03-27 RX ORDER — TRAMADOL HYDROCHLORIDE 50 MG/1
50 TABLET ORAL EVERY 8 HOURS PRN
Status: DISCONTINUED | OUTPATIENT
Start: 2020-03-27 | End: 2020-03-29

## 2020-03-27 RX ORDER — FUROSEMIDE 10 MG/ML
40 INJECTION INTRAMUSCULAR; INTRAVENOUS
Status: DISCONTINUED | OUTPATIENT
Start: 2020-03-27 | End: 2020-03-27

## 2020-03-27 RX ORDER — SODIUM CHLORIDE 0.9 % (FLUSH) 0.9 %
10 SYRINGE (ML) INJECTION AS NEEDED
Status: DISCONTINUED | OUTPATIENT
Start: 2020-03-27 | End: 2020-03-29

## 2020-03-27 RX ORDER — DIPHENHYDRAMINE HCL 25 MG
25 CAPSULE ORAL ONCE
Status: COMPLETED | OUTPATIENT
Start: 2020-03-27 | End: 2020-03-27

## 2020-03-27 RX ORDER — AMLODIPINE BESYLATE 10 MG/1
10 TABLET ORAL DAILY
Status: DISCONTINUED | OUTPATIENT
Start: 2020-03-28 | End: 2020-03-29

## 2020-03-27 RX ORDER — ACETAMINOPHEN 325 MG/1
650 TABLET ORAL EVERY 6 HOURS PRN
Status: DISCONTINUED | OUTPATIENT
Start: 2020-03-27 | End: 2020-03-29

## 2020-03-27 RX ORDER — LOPERAMIDE HYDROCHLORIDE 2 MG/1
2 CAPSULE ORAL 4 TIMES DAILY PRN
Status: DISCONTINUED | OUTPATIENT
Start: 2020-03-27 | End: 2020-03-29

## 2020-03-27 RX ORDER — METOPROLOL TARTRATE 50 MG/1
50 TABLET, FILM COATED ORAL
Status: DISCONTINUED | OUTPATIENT
Start: 2020-03-27 | End: 2020-03-29

## 2020-03-27 RX ORDER — PANTOPRAZOLE SODIUM 40 MG/1
40 TABLET, DELAYED RELEASE ORAL
Status: DISCONTINUED | OUTPATIENT
Start: 2020-03-28 | End: 2020-03-29

## 2020-03-27 NOTE — ED PROVIDER NOTES
Patient Seen in: [facility] AND [facility] Emergency Department      History   Patient presents with:  Dyspnea DESHAWN SOB    Stated Complaint: SOB    HPI    49-year-old pleasant female presenting for evaluation of difficulty breathing.   Her past medical history inc Smoking status: Former Smoker        Packs/day: 0.20        Years: 3.00        Pack years: .6        Types: Cigarettes        Quit date: 1967        Years since quittin.2      Smokeless tobacco: Never Used    Alcohol use: Yes      Alcohol/week: 1. 298 (*)     GFR, Non- 59 (*)     All other components within normal limits   FERRITIN - Abnormal; Notable for the following components:    Ferritin 10.3 (*)     All other components within normal limits   HEPATIC FUNCTION PANEL (7) - Abnorm with Shortness of breath. Differential diagnosis includes CHF, ACS/MI, pneumonia, viral syndrome. On arrival, she was brought in on CPAP by EMS. This was transitioned to Vapotherm in the ED then weaned to regular nasal cannula.   Patient is symptomatic Prescribed:  Current Discharge Medication List                   Present on Admission  Date Reviewed: 3/9/2020          ICD-10-CM Noted POA    Shortness of breath R06.02 3/27/2020 Unknown

## 2020-03-27 NOTE — CONSULTS
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  Lungs: Clear without wheezes, rales, rhonchi or dullness. Normal excursions and effort. Abdomen: Soft, non-tender. BS-present. Extremities: Without clubbing, cyanosis or edema. Peripheral pulses are 2+.   Neurologic: Non-focal  Skin: Warm and

## 2020-03-27 NOTE — H&P
[facility]    PATIENT'S NAME: Torrey Has   ATTENDING PHYSICIAN: Brooklyn Gutierrez MD   PATIENT ACCOUNT#:   [id number]    LOCATION:  [place] [address]  MEDICAL RECORD #:   [id number]       YOB: 1932  ADMISSION DATE:       03/27/202 allergies. FAMILY HISTORY:  Mother had cerebrovascular accident and hypertension. Father had coronary artery disease. SOCIAL HISTORY:  Ex-tobacco user. No current tobacco, alcohol, or drug use. Lives with her family.   Independent in her basic acti presentation, she is probably low suspicion for COVID 19, but considering her comorbidities, that will be ruled out as well. She will be on droplet isolation. , IV Lasix. 4.   Diabetes mellitus type 2.     PLAN:  The patient will be admitted to telemetry f

## 2020-03-27 NOTE — ED INITIAL ASSESSMENT (HPI)
Patient here from home with SOB starting this morning. May have had some contact with a family member that traveled. States she felt a little tired last might. Per medics, patient had a room air saturation of 90%. Placed on bipap by medics.

## 2020-03-28 ENCOUNTER — APPOINTMENT (OUTPATIENT)
Dept: CV DIAGNOSTICS | Facility: HOSPITAL | Age: 85
DRG: 291 | End: 2020-03-28
Attending: HOSPITALIST
Payer: MEDICARE

## 2020-03-28 PROCEDURE — 93306 TTE W/DOPPLER COMPLETE: CPT | Performed by: HOSPITALIST

## 2020-03-28 PROCEDURE — 99233 SBSQ HOSP IP/OBS HIGH 50: CPT | Performed by: HOSPITALIST

## 2020-03-28 RX ORDER — MAGNESIUM OXIDE 400 MG (241.3 MG MAGNESIUM) TABLET
800 TABLET ONCE
Status: COMPLETED | OUTPATIENT
Start: 2020-03-28 | End: 2020-03-28

## 2020-03-28 RX ORDER — POTASSIUM CHLORIDE 20 MEQ/1
40 TABLET, EXTENDED RELEASE ORAL ONCE
Status: COMPLETED | OUTPATIENT
Start: 2020-03-28 | End: 2020-03-28

## 2020-03-28 NOTE — PROGRESS NOTES
[facility] AND [facility]  [facility] Cardiology Progress Note    Gaurav Leon Patient Status:  Inpatient    10/25/1932 MRN [id number]   Location [address] [place] [address] Attending Michelle Newman MD   Hosp Day # 1 PCP Colleen Barboza MD     80year old female, consulted f • CATARACT      bilat   • COLONOSCOPY  2014    normal per pt. need records. per pt no more needed due to age also egd then but needs them intermittently.    • ESOPHAGOGASTRODUODENOSCOPY (EGD) N/A 2/15/2019    Performed by Karen Guy MD at

## 2020-03-28 NOTE — PLAN OF CARE
Repeat Hgb 7.8 s/p 1 unit PRBC. VSS. Denies SOB after blood transfusion. Melatonin administered for sleep.      Problem: Diabetes/Glucose Control  Goal: Glucose maintained within prescribed range  Description  INTERVENTIONS:  - Monitor Blood Glucose as orde care and decision-making at the level they choose  - Honor patient and family perspectives and choices   3/27/2020 2314 by Lew White RN  Outcome: Progressing  3/27/2020 2313 by Lew White, RN  Outcome: Progressing     Problem: CARDIOVASCULAR Spirometry  - Assess the need for suctioning and perform as needed  - Assess and instruct to report SOB or any respiratory difficulty  - Respiratory Therapy support as indicated  - Manage/alleviate anxiety  - Monitor for signs/symptoms of CO2 retention  3/

## 2020-03-28 NOTE — PLAN OF CARE
1 unit PRBC given. COVID results pending. IV lasix; good urine output. Tramadol given for pain.      Problem: Patient/Family Goals  Goal: Patient/Family Long Term Goal  Description  Patient's Long Term Goal: discharge home    Interventions:  - CHF managemen

## 2020-03-28 NOTE — PLAN OF CARE
VSS. Afebrile. No complaints of SOB at this time. L knee and hip pain 8/10 tonight, received tramadol. Fluid restriction maintained. Pt refusing insulin while here in the hospital. Plan for 2D echo later today.      Problem: Diabetes/Glucose Control  Goal: and hemodynamic stability  Description  INTERVENTIONS:  - Monitor vital signs, rhythm, and trends  - Monitor for bleeding, hypotension and signs of decreased cardiac output  - Evaluate effectiveness of vasoactive medications to optimize hemodynamic stabili

## 2020-03-28 NOTE — PROGRESS NOTES
[facility]  Progress Note     Mynor Catalan  : 10/25/1932    Status: Inpatient  Day #: 1    Attending: Rufina Mann MD  PCP: Raul Gonzalez MD      Assessment and Plan     Acute CHF, possible diastolic  -cardiology on consult  -f/u echo  - 109*   MG  --   --  1.2*   BILT 0.3  --   --    AST 18  --   --    ALT 18  --   --    ALKPHO 108  --   --    TP 7.0  --   --    TROP <0.045  --   --    B12  --  307  --        Xr Chest Ap Portable  (cpt=71045)    Result Date: 3/27/2020  CONCLUSION:  1.  Inc

## 2020-03-28 NOTE — PLAN OF CARE
Echo abnormal - moderately reduced EF with severe MR. Still elevated filling pressures.     Continue diuresis, will discuss medical management vs mitral clip evaluation based on overall goals of care    [facility], 03/28/20, 2:51 PM  George Quintanilla

## 2020-03-29 VITALS
BODY MASS INDEX: 23.39 KG/M2 | TEMPERATURE: 98 F | OXYGEN SATURATION: 99 % | WEIGHT: 132 LBS | HEART RATE: 83 BPM | HEIGHT: 63 IN | DIASTOLIC BLOOD PRESSURE: 53 MMHG | SYSTOLIC BLOOD PRESSURE: 125 MMHG | RESPIRATION RATE: 16 BRPM

## 2020-03-29 LAB
ANION GAP SERPL CALC-SCNC: 7 MMOL/L
BUN SERPL-MCNC: 18 MG/DL
BUN/CREAT SERPL: 20.9
CALCIUM SERPL-MCNC: 8.2 MG/DL
CHLORIDE SERPL-SCNC: 106 MMOL/L
CO2 SERPL-SCNC: 27 MMOL/L
CREAT SERPL-MCNC: 0.86 MG/DL
GLUCOSE SERPL-MCNC: 107 MG/DL
MAGNESIUM SERPL-MCNC: 1.2 MG/DL
POTASSIUM SERPL-SCNC: 3.3 MMOL/L
SODIUM SERPL-SCNC: 140 MMOL/L

## 2020-03-29 PROCEDURE — 99239 HOSP IP/OBS DSCHRG MGMT >30: CPT | Performed by: HOSPITALIST

## 2020-03-29 RX ORDER — POTASSIUM CHLORIDE 20 MEQ/1
40 TABLET, EXTENDED RELEASE ORAL EVERY 4 HOURS
Status: COMPLETED | OUTPATIENT
Start: 2020-03-29 | End: 2020-03-29

## 2020-03-29 RX ORDER — MAGNESIUM OXIDE 400 MG (241.3 MG MAGNESIUM) TABLET
800 TABLET ONCE
Status: COMPLETED | OUTPATIENT
Start: 2020-03-29 | End: 2020-03-29

## 2020-03-29 RX ORDER — FERROUS SULFATE 325(65) MG
325 TABLET ORAL
Qty: 30 TABLET | Refills: 0 | Status: SHIPPED | OUTPATIENT
Start: 2020-03-29 | End: 2021-07-23

## 2020-03-29 RX ORDER — TORSEMIDE 10 MG/1
10 TABLET ORAL DAILY
Qty: 30 TABLET | Refills: 1 | Status: SHIPPED | OUTPATIENT
Start: 2020-03-30 | End: 2020-06-01

## 2020-03-29 RX ORDER — TORSEMIDE 5 MG/1
10 TABLET ORAL DAILY
Status: DISCONTINUED | OUTPATIENT
Start: 2020-03-30 | End: 2020-03-29

## 2020-03-29 NOTE — DISCHARGE SUMMARY
[facility] - [facility]  Discharge Summary     Shelton Olszewski  : 10/25/1932    Status: Inpatient  Day #: 2    Attending: David Calhoun MD  PCP: Nasir Rodríguez MD     Date of Admission: 3/27/2020  Date of Discharge: 3/29/2020     Hospital Discharge Diagno source Oral, resp. rate 16, height 5' 3\" (1.6 m), weight 132 lb (59.9 kg), SpO2 99 %, not currently breastfeeding.   General:  Alert, no distress  HEENT:  Normocephalic, atraumatic  Neck:  Supple, symmetrical  Cardiac:  Regular rate, regular rhythm  Pulmon tablet  Refills:  0     MELATONIN OR      Take by mouth.    Refills:  0     Metoprolol Tartrate 50 MG Tabs  Commonly known as:  LOPRESSOR      TAKE 1 TABLET(50 MG) BY MOUTH TWICE DAILY   Quantity:  60 tablet  Refills:  11     Multi Vitamin/Minerals Tabs

## 2020-03-29 NOTE — PLAN OF CARE
Problem: Diabetes/Glucose Control  Goal: Glucose maintained within prescribed range  Description  INTERVENTIONS:  - Monitor Blood Glucose as ordered  - Assess for signs and symptoms of hyperglycemia and hypoglycemia  - Administer ordered medications to m medications to optimize hemodynamic stability  - Monitor arterial and/or venous puncture sites for bleeding and/or hematoma  - Assess quality of pulses, skin color and temperature  - Assess for signs of decreased coronary artery perfusion - ex.  Angina  - E

## 2020-03-29 NOTE — PROGRESS NOTES
[facility] AND [facility]  [facility] Cardiology Progress Note    [place] Patient Status:  Inpatient    10/25/1932 MRN [id number]   Location [facility] Attending Lore Corona MD   Hosp Day # 2 PCP Nancie Dominguez MD     80year old female, consulted f controlled   • Essential hypertension    • GERD (gastroesophageal reflux disease)    • Hiatal hernia 1998   • Hyperlipidemia    • Osteoarthritis    • Spinal stenosis    • Spinal stenosis 2003   • Uterine cancer ([facility] [address] 1995    hyst with bso and s/p radiation or edema. Peripheral pulses are 2+. Neurologic: Non-focal  Skin: Warm and dry.      WHITNEY Gonzalez  S Cardiology  03/29/20

## 2020-03-29 NOTE — PLAN OF CARE
Patient alert and oriented, compliant with plan of care, room air, immodium given as patient states she gets diarrhea due to part of her small intestine missing, fluid restriction, cardiac diet, will cont to monitor  Problem: Diabetes/Glucose Control  Goal output and hemodynamic stability  Description  INTERVENTIONS:  - Monitor vital signs, rhythm, and trends  - Monitor for bleeding, hypotension and signs of decreased cardiac output  - Evaluate effectiveness of vasoactive medications to optimize hemodynamic

## 2020-03-30 ENCOUNTER — PATIENT OUTREACH (OUTPATIENT)
Dept: CASE MANAGEMENT | Age: 85
End: 2020-03-30

## 2020-03-30 NOTE — PAYOR COMM NOTE
--------------  ADMISSION REVIEW     Payor: [employer] ADV PPO  Subscriber #:  [id number]  Authorization Number: [id number]    Admit date: 3/27/20  Admit time: 1252       Patient Seen in: [facility] Emergency Department    History   Patient pre and Rhythm: Normal rate and regular rhythm. Heart sounds: Normal heart sounds. Comments: Trace pitting edema bilaterally  Pulmonary:      Effort: Pulmonary effort is normal.      Breath sounds: Normal breath sounds.    Abdominal:      General: The AND PHYSICAL EXAMINATION    CHIEF COMPLAINT:  Acute respiratory distress, heart failure, and anemia.     HISTORY OF PRESENT ILLNESS:  The patient is an 61-year-old  female who has been having progressive difficulty breathing for the last 2 to 3 day Progressively, she has been more short of breath with activity. Last night she was having orthopnea. This morning woke up with wheezing. Also, she has been noticing increased swelling of her legs. No recent fever, chills, cough, or sore throat.   No rec echo  -daily wt, I/O - wt down, -2L since admission  -on lasix IV  -covid neg     Anemia  -hgb improved  -rectal exam on admission hemoccult negative  -monitor  -iron def - venofer  -vit B12 ok     DM2  -monitor BS, cover ISS     Other:  -h/o PUD  -HL  -HT furosemide  40 mg Intravenous BID   • Insulin Aspart Pen  1-7 Units Subcutaneous TID CC   • amLODIPine Besylate  10 mg Oral Daily   • lisinopril  40 mg Oral Daily   • magnesium oxide  400 mg Oral Daily   • Metoprolol Tartrate  50 mg Oral 2x Daily(Beta Bloc

## 2020-03-30 NOTE — PROGRESS NOTES
1st attempt SCC/HF apt request    [facility]   [place]  [phone]  [facility]--4-6 wk apt   [address] [place] [address]

## 2020-03-31 NOTE — PAYOR COMM NOTE
--------------  DISCHARGE REVIEW    Payor: [employer] MED[employer]ARE ADV PPO  Subscriber #:  [id number]  Authorization Number: Bunny Less date: 3/27/20  Admit time:  1252  Discharge Date: 3/29/2020  1:48 PM    Please confirm inpatient authorization.   Thank CHF  Severe MR  -cardiology on consult  -echo shows EF 40-45% and severe MR  -diuresed well with lasix IV.  Torsemide 10mg po daily on discharge.   -covid neg     Anemia  -hgb improved  -rectal exam on admission hemoccult negative  -monitor - has been stabl LOTENSIN      TAKE 1 TABLET(40 MG) BY MOUTH DAILY   Quantity:  90 tablet  Refills:  0     cyanocobalamin 1000 MCG/ML Soln  Commonly known as:  VITAMIN B12      Givdge gi1 ML IM once a week for 4 weeks  Then monthly thereafter   Quantity:  4 vial  Refills: [facility] [id number]  [phone]           Neela Lin MD.    Specialty:  Internal Medicine  Contact information:  [address]  [address] Discharge Diagnoses: acute diastolic CHF    Lace+ Score: 50

## 2020-03-31 NOTE — PROGRESS NOTES
2nd attempt SCC/HF apt request     [facility] [place]  [phone]  [facility]--4-6 wk apt   [address] [facility] [address] [facility]

## 2020-04-01 NOTE — PROGRESS NOTES
3rd attempt SCC/HF apt request     [facility] [place]  [phone]  [facility]--4-6 wk apt   [address] [facility] [place] [facility] [address] [facility]  [place]

## 2020-04-06 NOTE — PROGRESS NOTES
Patient left  on Friday 4/3 around 5:30 pm.  LMTCB for post hospital follow up. NCM contact information provided.

## 2020-05-01 RX ORDER — AMLODIPINE BESYLATE 10 MG/1
TABLET ORAL
Qty: 90 TABLET | Refills: 0 | Status: SHIPPED | OUTPATIENT
Start: 2020-05-01 | End: 2020-07-30

## 2020-05-01 RX ORDER — BENAZEPRIL HYDROCHLORIDE 40 MG/1
TABLET, FILM COATED ORAL
Qty: 90 TABLET | Refills: 0 | Status: SHIPPED | OUTPATIENT
Start: 2020-05-01 | End: 2020-07-30

## 2020-05-05 ENCOUNTER — VIRTUAL PHONE E/M (OUTPATIENT)
Dept: INTERNAL MEDICINE CLINIC | Facility: CLINIC | Age: 85
End: 2020-05-05
Payer: MEDICARE

## 2020-05-05 DIAGNOSIS — E53.8 VITAMIN B12 DEFICIENCY: ICD-10-CM

## 2020-05-05 DIAGNOSIS — E55.9 VITAMIN D DEFICIENCY: ICD-10-CM

## 2020-05-05 DIAGNOSIS — E78.5 HYPERLIPIDEMIA, UNSPECIFIED HYPERLIPIDEMIA TYPE: ICD-10-CM

## 2020-05-05 DIAGNOSIS — M15.9 PRIMARY OSTEOARTHRITIS INVOLVING MULTIPLE JOINTS: ICD-10-CM

## 2020-05-05 DIAGNOSIS — I10 ESSENTIAL HYPERTENSION: Primary | ICD-10-CM

## 2020-05-05 PROCEDURE — 99443 PHONE E/M BY PHYS 21-30 MIN: CPT | Performed by: INTERNAL MEDICINE

## 2020-05-05 NOTE — PROGRESS NOTES
Virtual Telephone Check-In    Jerrica Edmonds verbally consents to a Virtual/Telephone Check-In visit on 05/5/20. Patient understands and accepts financial responsibility for any deductible, co-insurance and/or co-pays associated with this service.     Dur constipation, nausea and vomiting.         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 • Multiple Vitamins-Minerals (MULTI VITAMIN/MINERALS) Oral Tab Take by mouth daily.        Allergies:No Known Allergies    HISTORY:  Past Medical History:   Diagnosis Date   • Arthritis    • B12 deficiency PHYSICAL EXAM:    Physical Exam    Pt is alert  speaks in full sentences without shortness of breath           ASSESSMENT/PLAN:   (I10) Essential hypertension  (primary encounter diagnosis)  Plan: COMP METABOLIC PANEL (14), CBC, PLATELET; NO         DI

## 2020-05-11 RX ORDER — METOPROLOL TARTRATE 50 MG/1
TABLET, FILM COATED ORAL
Qty: 60 TABLET | Refills: 11 | Status: SHIPPED | OUTPATIENT
Start: 2020-05-11 | End: 2021-02-16

## 2020-05-28 RX ORDER — TORSEMIDE 10 MG/1
10 TABLET ORAL DAILY
COMMUNITY
Start: 2020-03-30 | End: 2020-06-01 | Stop reason: SDUPTHER

## 2020-05-28 RX ORDER — BENAZEPRIL HYDROCHLORIDE 40 MG/1
1 TABLET, FILM COATED ORAL DAILY
COMMUNITY
Start: 2020-05-01

## 2020-05-28 RX ORDER — FERROUS SULFATE 325(65) MG
325 TABLET ORAL DAILY
COMMUNITY
Start: 2020-03-29 | End: 2020-09-10

## 2020-05-28 RX ORDER — CYANOCOBALAMIN 1000 UG/ML
INJECTION, SOLUTION INTRAMUSCULAR; SUBCUTANEOUS
COMMUNITY
Start: 2020-03-18

## 2020-05-28 RX ORDER — LOPERAMIDE HYDROCHLORIDE 2 MG/1
2 CAPSULE ORAL PRN
COMMUNITY

## 2020-05-28 RX ORDER — MAGNESIUM OXIDE 400 MG/1
400 TABLET ORAL DAILY
COMMUNITY
Start: 2019-03-21 | End: 2020-09-10

## 2020-05-28 RX ORDER — OMEPRAZOLE 40 MG/1
1 CAPSULE, DELAYED RELEASE ORAL DAILY
COMMUNITY
Start: 2020-01-22

## 2020-05-28 RX ORDER — AMLODIPINE BESYLATE 10 MG/1
1 TABLET ORAL DAILY
COMMUNITY
Start: 2020-05-01

## 2020-05-28 RX ORDER — METOPROLOL TARTRATE 50 MG/1
1 TABLET, FILM COATED ORAL DAILY
COMMUNITY
Start: 2020-05-11 | End: 2020-09-10 | Stop reason: ALTCHOICE

## 2020-05-28 RX ORDER — TRAMADOL HYDROCHLORIDE 50 MG/1
50 TABLET ORAL
COMMUNITY
Start: 2020-03-09

## 2020-05-28 RX ORDER — ERGOCALCIFEROL 1.25 MG/1
50000 CAPSULE ORAL
COMMUNITY
Start: 2020-03-06 | End: 2020-06-04

## 2020-05-29 ENCOUNTER — TELEPHONE (OUTPATIENT)
Dept: INTERNAL MEDICINE CLINIC | Facility: CLINIC | Age: 85
End: 2020-05-29

## 2020-06-01 ENCOUNTER — OFFICE VISIT (OUTPATIENT)
Dept: CARDIOLOGY | Age: 85
End: 2020-06-01

## 2020-06-01 VITALS
HEART RATE: 68 BPM | WEIGHT: 137 LBS | DIASTOLIC BLOOD PRESSURE: 70 MMHG | OXYGEN SATURATION: 99 % | SYSTOLIC BLOOD PRESSURE: 150 MMHG | BODY MASS INDEX: 24.27 KG/M2 | HEIGHT: 63 IN

## 2020-06-01 DIAGNOSIS — I50.22 CHRONIC SYSTOLIC HEART FAILURE (CMD): ICD-10-CM

## 2020-06-01 DIAGNOSIS — I34.0 NONRHEUMATIC MITRAL VALVE REGURGITATION: ICD-10-CM

## 2020-06-01 DIAGNOSIS — E78.2 MIXED HYPERLIPIDEMIA: ICD-10-CM

## 2020-06-01 DIAGNOSIS — I10 ESSENTIAL HYPERTENSION: Primary | ICD-10-CM

## 2020-06-01 DIAGNOSIS — D50.8 IRON DEFICIENCY ANEMIA SECONDARY TO INADEQUATE DIETARY IRON INTAKE: ICD-10-CM

## 2020-06-01 PROCEDURE — 99214 OFFICE O/P EST MOD 30 MIN: CPT | Performed by: INTERNAL MEDICINE

## 2020-06-01 RX ORDER — TORSEMIDE 10 MG/1
10 TABLET ORAL DAILY
Qty: 90 TABLET | Refills: 3 | Status: SHIPPED | OUTPATIENT
Start: 2020-06-01 | End: 2020-06-01 | Stop reason: SDUPTHER

## 2020-06-01 RX ORDER — TORSEMIDE 10 MG/1
10 TABLET ORAL DAILY
Qty: 90 TABLET | Refills: 3 | Status: SHIPPED | OUTPATIENT
Start: 2020-06-01

## 2020-06-01 ASSESSMENT — PATIENT HEALTH QUESTIONNAIRE - PHQ9
SUM OF ALL RESPONSES TO PHQ9 QUESTIONS 1 AND 2: 0
CLINICAL INTERPRETATION OF PHQ2 SCORE: NO FURTHER SCREENING NEEDED
2. FEELING DOWN, DEPRESSED OR HOPELESS: NOT AT ALL
SUM OF ALL RESPONSES TO PHQ9 QUESTIONS 1 AND 2: 0
CLINICAL INTERPRETATION OF PHQ9 SCORE: NO FURTHER SCREENING NEEDED
1. LITTLE INTEREST OR PLEASURE IN DOING THINGS: NOT AT ALL

## 2020-06-02 ENCOUNTER — TELEPHONE (OUTPATIENT)
Dept: CARDIOLOGY | Age: 85
End: 2020-06-02

## 2020-06-02 DIAGNOSIS — D50.8 IRON DEFICIENCY ANEMIA SECONDARY TO INADEQUATE DIETARY IRON INTAKE: Primary | ICD-10-CM

## 2020-06-08 ENCOUNTER — DOCUMENTATION (OUTPATIENT)
Dept: CARDIOLOGY | Age: 85
End: 2020-06-08

## 2020-06-08 PROBLEM — D50.8 IRON DEFICIENCY ANEMIA SECONDARY TO INADEQUATE DIETARY IRON INTAKE: Status: ACTIVE | Noted: 2020-06-08

## 2020-06-11 ENCOUNTER — TELEPHONE (OUTPATIENT)
Dept: HEMATOLOGY/ONCOLOGY | Facility: HOSPITAL | Age: 85
End: 2020-06-11

## 2020-06-16 ENCOUNTER — OFFICE VISIT (OUTPATIENT)
Dept: HEMATOLOGY/ONCOLOGY | Facility: HOSPITAL | Age: 85
End: 2020-06-16
Attending: INTERNAL MEDICINE
Payer: MEDICARE

## 2020-06-16 VITALS
HEART RATE: 57 BPM | DIASTOLIC BLOOD PRESSURE: 43 MMHG | TEMPERATURE: 99 F | BODY MASS INDEX: 24 KG/M2 | RESPIRATION RATE: 16 BRPM | SYSTOLIC BLOOD PRESSURE: 143 MMHG | WEIGHT: 137 LBS | OXYGEN SATURATION: 98 %

## 2020-06-16 DIAGNOSIS — D50.8 IRON DEFICIENCY ANEMIA SECONDARY TO INADEQUATE DIETARY IRON INTAKE: Primary | ICD-10-CM

## 2020-06-16 PROCEDURE — 96365 THER/PROPH/DIAG IV INF INIT: CPT

## 2020-06-16 NOTE — PROGRESS NOTES
Patient to center for MS [facility] 1/2   Labs reviewed from 6/2 . Darin Mares states she is asymptomatic at this time. denies any fatigue or SOB.   Discuss medication profile and possible SE   Bobby information sheet provided  Injectafer  administered over 15 minut

## 2020-06-23 ENCOUNTER — OFFICE VISIT (OUTPATIENT)
Dept: HEMATOLOGY/ONCOLOGY | Facility: HOSPITAL | Age: 85
End: 2020-06-23
Attending: INTERNAL MEDICINE
Payer: MEDICARE

## 2020-06-23 VITALS
SYSTOLIC BLOOD PRESSURE: 122 MMHG | OXYGEN SATURATION: 97 % | DIASTOLIC BLOOD PRESSURE: 43 MMHG | HEART RATE: 59 BPM | RESPIRATION RATE: 16 BRPM | TEMPERATURE: 98 F

## 2020-06-23 DIAGNOSIS — D50.8 IRON DEFICIENCY ANEMIA SECONDARY TO INADEQUATE DIETARY IRON INTAKE: Primary | ICD-10-CM

## 2020-06-23 PROCEDURE — 96374 THER/PROPH/DIAG INJ IV PUSH: CPT

## 2020-06-23 NOTE — PROGRESS NOTES
Patient to center for MS [facility] 2/2   Labs reviewed from 6/2 . Catie Fields states she is asymptomatic at this time. denies any fatigue or SOB.   No health changes since last visit   Injectafer  administered over 15 minutes and tolerated well  Patient observed for

## 2020-07-06 RX ORDER — TRAMADOL HYDROCHLORIDE 50 MG/1
50 TABLET ORAL EVERY 8 HOURS PRN
Qty: 90 TABLET | Refills: 2 | Status: SHIPPED | OUTPATIENT
Start: 2020-07-06 | End: 2020-10-12

## 2020-07-06 NOTE — TELEPHONE ENCOUNTER
LOV: 3/9/2020  Last filled: 3/9/2020  #90 tablet with 2 refills   No future appointments. Schedule f/u by Sept 2020?    Labs:     Component      Latest Ref Rng & Units 3/29/2020   Glucose      70 - 99 mg/dL 107 (H)   Sodium      136 - 145 mmol/L 140   Pot

## 2020-07-06 NOTE — TELEPHONE ENCOUNTER
Patient is returning phone call and advised message below. Patient declined to make follow up appointment at this moment and states she will call back once she talks to daughter to schedule appointment. [facility] office, no answer.

## 2020-07-06 NOTE — TELEPHONE ENCOUNTER
Patient is requesting a refill, she has requested a refill through her pharmacy multiple times and has not received a response. She is out of the medication.      traMADol HCl 50 MG Oral Tab

## 2020-07-30 RX ORDER — BENAZEPRIL HYDROCHLORIDE 40 MG/1
TABLET, FILM COATED ORAL
Qty: 90 TABLET | Refills: 0 | Status: SHIPPED | OUTPATIENT
Start: 2020-07-30 | End: 2020-10-31

## 2020-07-30 RX ORDER — AMLODIPINE BESYLATE 10 MG/1
TABLET ORAL
Qty: 90 TABLET | Refills: 0 | Status: SHIPPED | OUTPATIENT
Start: 2020-07-30 | End: 2020-10-31

## 2020-09-10 ENCOUNTER — OFFICE VISIT (OUTPATIENT)
Dept: CARDIOLOGY | Age: 85
End: 2020-09-10

## 2020-09-10 VITALS
HEART RATE: 68 BPM | BODY MASS INDEX: 25.21 KG/M2 | HEIGHT: 62 IN | DIASTOLIC BLOOD PRESSURE: 52 MMHG | WEIGHT: 137 LBS | SYSTOLIC BLOOD PRESSURE: 144 MMHG | OXYGEN SATURATION: 99 %

## 2020-09-10 DIAGNOSIS — I34.0 NONRHEUMATIC MITRAL VALVE REGURGITATION: ICD-10-CM

## 2020-09-10 DIAGNOSIS — I10 ESSENTIAL HYPERTENSION: Primary | ICD-10-CM

## 2020-09-10 DIAGNOSIS — I50.22 CHRONIC SYSTOLIC HEART FAILURE (CMD): ICD-10-CM

## 2020-09-10 PROCEDURE — 99214 OFFICE O/P EST MOD 30 MIN: CPT | Performed by: INTERNAL MEDICINE

## 2020-09-10 RX ORDER — CARVEDILOL 12.5 MG/1
12.5 TABLET ORAL 2 TIMES DAILY WITH MEALS
Qty: 180 TABLET | Refills: 3 | Status: SHIPPED | OUTPATIENT
Start: 2020-09-10

## 2020-09-10 SDOH — HEALTH STABILITY: MENTAL HEALTH: HOW MANY STANDARD DRINKS CONTAINING ALCOHOL DO YOU HAVE ON A TYPICAL DAY?: 1 OR 2

## 2020-09-10 SDOH — HEALTH STABILITY: MENTAL HEALTH: HOW OFTEN DO YOU HAVE A DRINK CONTAINING ALCOHOL?: 4 OR MORE TIMES A WEEK

## 2020-09-10 ASSESSMENT — PATIENT HEALTH QUESTIONNAIRE - PHQ9
CLINICAL INTERPRETATION OF PHQ9 SCORE: NO FURTHER SCREENING NEEDED
SUM OF ALL RESPONSES TO PHQ9 QUESTIONS 1 AND 2: 2
CLINICAL INTERPRETATION OF PHQ2 SCORE: NO FURTHER SCREENING NEEDED
1. LITTLE INTEREST OR PLEASURE IN DOING THINGS: SEVERAL DAYS
2. FEELING DOWN, DEPRESSED OR HOPELESS: SEVERAL DAYS
SUM OF ALL RESPONSES TO PHQ9 QUESTIONS 1 AND 2: 2

## 2020-10-01 ENCOUNTER — ANCILLARY PROCEDURE (OUTPATIENT)
Dept: CARDIOLOGY | Age: 85
End: 2020-10-01
Attending: INTERNAL MEDICINE

## 2020-10-01 DIAGNOSIS — I50.22 CHRONIC SYSTOLIC HEART FAILURE (CMD): ICD-10-CM

## 2020-10-01 PROCEDURE — 93306 TTE W/DOPPLER COMPLETE: CPT | Performed by: INTERNAL MEDICINE

## 2020-10-12 RX ORDER — TRAMADOL HYDROCHLORIDE 50 MG/1
50 TABLET ORAL EVERY 8 HOURS PRN
Qty: 90 TABLET | Refills: 0 | Status: SHIPPED | OUTPATIENT
Start: 2020-10-12 | End: 2020-11-20

## 2020-10-12 NOTE — TELEPHONE ENCOUNTER
Patient requesting refill, states is out of medication and the \"pharmacy has tried to contact the office multiple times for two weeks\"          traMADol HCl 50 MG Oral Tab

## 2020-10-12 NOTE — TELEPHONE ENCOUNTER
LOV: 3/9/20  No future appointments. Last refill: 7/6/20 #90, refill: 2  Left message to call back to schedule appt. Pt is on narcotics:   The patient is not undergoing treatment for chronic non-cancer pain  Patient has confirmed that there is a patient

## 2020-10-14 NOTE — TELEPHONE ENCOUNTER
CHIEF COMPLAINT:   Chief Complaint   Patient presents with   • Urinary Frequency       HISTORY OF PRESENT ILLNESS:   Patient is a 75-year-old female who comes to Urgent Care today with concerns for possible urinary tract infection.  The patient urinary frequency 2 days ago and burning today.  She has had some slight chills and lower abdominal cramping.  No fevers, body aches, shortness of breath, chest pain, upper respiratory symptoms.  She has had constipation and reports that over the last few days she has not been good at staying well hydrated.  Today she has drank plenty of fluids but she believes the constipation is related to not drinking fluids over the last few days.  Patient does have frequent UTIs.  Last Urinary tract infection was in August.    Past Medical History:   Diagnosis Date   • Adhesive capsulitis    • Allergy    • BCC (basal cell carcinoma of skin)    • Cavernous hemangioma    • Fractures    • GERD (gastroesophageal reflux disease)    • Gitelman syndrome     adrenal gland issues   • Hearing loss of both ears    • Hyperlipidemia    • Hypokalemia    • Hypomagnesemia    • Keratosis, actinic    • Osteopenia 06/20/2012   • Pneumonia     lingular   • SCC (squamous cell carcinoma), leg 5/18/2015   • TIA (transient ischemic attack) 2003   • Urinary tract infection, site not specified    • Vitamin D deficiency        Past Surgical History:   Procedure Laterality Date   • Breast biopsy Right 01/01/2000   • Colonoscopy  02/03/2020    Repeat 10 years    • Colonoscopy diagnostic  04/16/2009   • Dexa bone density axial skeleton  06/20/2012   • Esophagogastroduodenoscopy  03/09/2015   • Esophagogastroduodenoscopy  02/02/2018   • Hysterectomy  06/21/2011    w/bladder suspension   • Incision and drainage  12/28/2016    incision and drainge of lower back abscess   • Laparoscopic cholecystectomy  03/01/2001       Current Outpatient Medications   Medication Sig Dispense Refill   • lansoprazole (PREVACID) 30 MG  Pt calling back.  Would like a return  call capsule TAKE 1 CAPSULE EVERY DAY 90 capsule 1   • potassium chloride (KLOR-CON) 10 MEQ ER tablet Take 2 tablets by mouth 3 times daily. 12 tablet 0   • spironolactone (ALDACTONE) 25 MG tablet Take 1 tablet by mouth 2 times daily. 4 tablet 0   • calcium carbonate (TUMS) 500 MG chewable tablet Chew 1 tablet by mouth as needed for Heartburn.     • loratadine (CLARITIN) 10 MG tablet Take 10 mg by mouth daily.     • Probiotic Product (PROBIOTIC PO) Take 1 capsule by mouth daily.      • Magnesium Oxide 400 (241.3 Mg) MG Tab Take 2 tablets by mouth daily. 90 tablet 0   • CRANBERRY PO Take 1 tablet by mouth daily.      • meloxicam (MOBIC) 7.5 MG tablet TAKE 1 TABLET BY MOUTH DAILY 90 tablet 0   • Valacyclovir HCl (VALTREX) 1000 MG Tab At first onset of breakout take 2 TAB PO, then take 2 TAB PO 12 hours later 8 tablet 3   • VITAMIN D, CHOLECALCIFEROL, PO Take 2,000 Int'l Units by mouth daily.     • calcium carbonate-vitamin D (CALTRATE+D) 600-400 MG-UNIT per tablet Take 1 tablet by mouth daily.     • Multiple Vitamins-Minerals (MULTIVITAMIN PO) Take 1 tablet by mouth every other day.      • sulfamethoxazole-trimethoprim (Bactrim DS) 800-160 MG per tablet Take 1 tablet by mouth 2 times daily for 7 days. 14 tablet 0   • 5-fluorouracil 4.5% cream Apply twice daily to affected areas on the face for 2 weeks. 30 g 1     No current facility-administered medications for this visit.        Allergies as of 10/14/2020 - Reviewed 10/14/2020   Allergen Reaction Noted   • Augmentin [amoclan] RASH 10/28/2013   • Macrobid [nitrofurantoin monohydrate macrocrystals] RASH 10/28/2013       Social History     Socioeconomic History   • Marital status: /Civil Union     Spouse name: Not on file   • Number of children: Not on file   • Years of education: Not on file   • Highest education level: Not on file   Occupational History   • Not on file   Social Needs   • Financial resource strain: Not on file   • Food insecurity     Worry: Not on  file     Inability: Not on file   • Transportation needs     Medical: Not on file     Non-medical: Not on file   Tobacco Use   • Smoking status: Never Smoker   • Smokeless tobacco: Never Used   Substance and Sexual Activity   • Alcohol use: Yes     Frequency: Monthly or less     Drinks per session: 1 or 2     Binge frequency: Never     Comment: 5 glasses of wine per year   • Drug use: No   • Sexual activity: Not Currently   Lifestyle   • Physical activity     Days per week: Not on file     Minutes per session: Not on file   • Stress: Not on file   Relationships   • Social connections     Talks on phone: Not on file     Gets together: Not on file     Attends Advent service: Not on file     Active member of club or organization: Not on file     Attends meetings of clubs or organizations: Not on file     Relationship status: Not on file   • Intimate partner violence     Fear of current or ex partner: Not on file     Emotionally abused: Not on file     Physically abused: Not on file     Forced sexual activity: Not on file   Other Topics Concern   •  Service Not Asked   • Blood Transfusions Not Asked   • Caffeine Concern Not Asked   • Occupational Exposure Not Asked   • Hobby Hazards Not Asked   • Sleep Concern Not Asked   • Stress Concern Not Asked   • Weight Concern Not Asked   • Special Diet Not Asked   • Back Care Not Asked   • Exercise Not Asked   • Bike Helmet Not Asked   • Seat Belt Yes   • Self-Exams Not Asked   Social History Narrative   • Not on file       Family History   Problem Relation Age of Onset   • Heart disease Father        I have reviewed the past medical history, family history, social history, medications and allergies listed in the medical record as obtained by my nursing staff and support staff and agree with their documentation    REVIEW OF SYSTEMS:  Limited by: none  As per HPI unless otherwise noted      Physical Exam:  VITAL SIGNS:      Visit Vitals  /80 (BP Location: [facility]  Left upper extremity, Patient Position: Sitting, Cuff Size: Regular)   Pulse 76   Temp 98.6 °F (37 °C) (Tympanic)   Resp 12   SpO2 98%     GENERAL:  Comfortable, minimal distress, non toxic appearing  CARDIOVASCULAR: Regular rate and rhythm, no murmurs/gallops/rubs  RESPIRATORY:  Clear to auscultation bilaterally, no wheezing, rhonchi, rales; no increased respiratory effort. Breath sounds equal bilaterally.  GASTROINTESTINAL: Soft, reports mild pressure sensation with palpation of suprapubic area, no pain to palpation in all other areas, non-distended, bowel sounds present and normal throughout.   :  no costovertebral tenderness   MUSCULOSKELETAL: Moving extremities equally, normal gait.    SKIN:   Warm and dry, well perfused.  NEUROLOGICAL:  Alert and awake  PSYCHIATRIC:  Speech and behavior appropriate.       Clinical Course:  Orders Placed This Encounter   • POCT Urine Dip Auto   • Urine, Bacterial Culture   • sulfamethoxazole-trimethoprim (Bactrim DS) 800-160 MG per tablet         Rebecca was seen today for urinary frequency.    Diagnoses and all orders for this visit:    Acute UTI  -     POCT URINE DIP AUTO  -     URINE, BACTERIAL CULTURE  -     sulfamethoxazole-trimethoprim (Bactrim DS) 800-160 MG per tablet; Take 1 tablet by mouth 2 times daily for 7 days.        Discussed with patient the physical exam findings  History and physical consistent with acute Urinary tract infection  Urine dip results trace intact blood, moderate leukocytes  Patient will be called with results of urine culture in 2-3 days    Patient treated with Bactrim for 7 days, I recommend that she have a follow-up urinalysis with micro done 3 days after completion of the antibiotic.  She is to call her primary care provider to have order placed and for follow-up.    Written Instructions Provided to the patient and reviewed in detail, all questions answered.    Follow up with Primary MD in the next 5-7 days if no improvement -sooner if  worse. Return or go to the ER with any worsening symptoms, problems, concerns.    Patient acknowledged shared decision making at the end of our conversation.    Discharge plan: Attached and as above    Provider wore:  Mask and face shield    Please note  that [employer] dictation software was used to transcribe this note. Attempts are made to proofread but transcription errors can and do occur.

## 2020-10-19 ENCOUNTER — TELEPHONE (OUTPATIENT)
Dept: CARDIOLOGY | Age: 85
End: 2020-10-19

## 2020-10-31 RX ORDER — BENAZEPRIL HYDROCHLORIDE 40 MG/1
TABLET, FILM COATED ORAL
Qty: 90 TABLET | Refills: 0 | Status: SHIPPED | OUTPATIENT
Start: 2020-10-31 | End: 2021-01-26

## 2020-10-31 RX ORDER — AMLODIPINE BESYLATE 10 MG/1
TABLET ORAL
Qty: 90 TABLET | Refills: 0 | Status: SHIPPED | OUTPATIENT
Start: 2020-10-31 | End: 2021-01-26

## 2020-11-23 RX ORDER — TRAMADOL HYDROCHLORIDE 50 MG/1
50 TABLET ORAL EVERY 8 HOURS PRN
Qty: 90 TABLET | Refills: 0 | Status: SHIPPED | OUTPATIENT
Start: 2020-11-23 | End: 2020-12-22

## 2020-12-02 RX ORDER — METOPROLOL TARTRATE 50 MG/1
50 TABLET, FILM COATED ORAL DAILY
COMMUNITY
Start: 2020-09-28 | End: 2020-12-02 | Stop reason: ALTCHOICE

## 2020-12-10 ENCOUNTER — OFFICE VISIT (OUTPATIENT)
Dept: CARDIOLOGY | Age: 85
End: 2020-12-10

## 2020-12-10 VITALS
SYSTOLIC BLOOD PRESSURE: 136 MMHG | OXYGEN SATURATION: 98 % | WEIGHT: 140 LBS | BODY MASS INDEX: 25.76 KG/M2 | DIASTOLIC BLOOD PRESSURE: 56 MMHG | HEART RATE: 83 BPM | HEIGHT: 62 IN

## 2020-12-10 DIAGNOSIS — I50.22 CHRONIC SYSTOLIC HEART FAILURE (CMD): ICD-10-CM

## 2020-12-10 DIAGNOSIS — I10 ESSENTIAL HYPERTENSION: Primary | ICD-10-CM

## 2020-12-10 DIAGNOSIS — E78.2 MIXED HYPERLIPIDEMIA: ICD-10-CM

## 2020-12-10 DIAGNOSIS — I34.0 NONRHEUMATIC MITRAL VALVE REGURGITATION: ICD-10-CM

## 2020-12-10 PROCEDURE — 99214 OFFICE O/P EST MOD 30 MIN: CPT | Performed by: INTERNAL MEDICINE

## 2020-12-10 ASSESSMENT — PATIENT HEALTH QUESTIONNAIRE - PHQ9
SUM OF ALL RESPONSES TO PHQ9 QUESTIONS 1 AND 2: 0
SUM OF ALL RESPONSES TO PHQ9 QUESTIONS 1 AND 2: 0
CLINICAL INTERPRETATION OF PHQ2 SCORE: NO FURTHER SCREENING NEEDED
CLINICAL INTERPRETATION OF PHQ9 SCORE: NO FURTHER SCREENING NEEDED
1. LITTLE INTEREST OR PLEASURE IN DOING THINGS: NOT AT ALL
2. FEELING DOWN, DEPRESSED OR HOPELESS: NOT AT ALL

## 2020-12-21 ENCOUNTER — MED REC SCAN ONLY (OUTPATIENT)
Dept: INTERNAL MEDICINE CLINIC | Facility: CLINIC | Age: 85
End: 2020-12-21

## 2020-12-22 RX ORDER — TRAMADOL HYDROCHLORIDE 50 MG/1
TABLET ORAL
Qty: 90 TABLET | Refills: 0 | Status: SHIPPED | OUTPATIENT
Start: 2020-12-22 | End: 2021-01-25

## 2021-01-25 RX ORDER — TRAMADOL HYDROCHLORIDE 50 MG/1
TABLET ORAL
Qty: 90 TABLET | Refills: 0 | Status: SHIPPED | OUTPATIENT
Start: 2021-01-25 | End: 2021-02-16

## 2021-01-26 RX ORDER — BENAZEPRIL HYDROCHLORIDE 40 MG/1
TABLET, FILM COATED ORAL
Qty: 90 TABLET | Refills: 0 | Status: SHIPPED | OUTPATIENT
Start: 2021-01-26 | End: 2021-04-26

## 2021-01-26 RX ORDER — AMLODIPINE BESYLATE 10 MG/1
TABLET ORAL
Qty: 90 TABLET | Refills: 0 | Status: SHIPPED | OUTPATIENT
Start: 2021-01-26 | End: 2021-04-26

## 2021-02-02 DIAGNOSIS — Z23 NEED FOR VACCINATION: ICD-10-CM

## 2021-02-17 ENCOUNTER — TELEPHONE (OUTPATIENT)
Dept: INTERNAL MEDICINE CLINIC | Facility: CLINIC | Age: 86
End: 2021-02-17

## 2021-02-17 NOTE — TELEPHONE ENCOUNTER
[employer] message sent to patient. Patient Review of Clinical Information    Problems   The patient or proxy has not reviewed this information.    Medications   The patient or proxy has not reviewed this information, and there are updates pending:   Jenny

## 2021-02-28 RX ORDER — TRAMADOL HYDROCHLORIDE 50 MG/1
50 TABLET ORAL EVERY 8 HOURS PRN
Qty: 90 TABLET | Refills: 0 | Status: SHIPPED | OUTPATIENT
Start: 2021-02-28 | End: 2021-03-31

## 2021-03-31 RX ORDER — TRAMADOL HYDROCHLORIDE 50 MG/1
50 TABLET ORAL EVERY 8 HOURS PRN
Qty: 90 TABLET | Refills: 0 | Status: SHIPPED | OUTPATIENT
Start: 2021-03-31 | End: 2021-05-07

## 2021-03-31 NOTE — TELEPHONE ENCOUNTER
•  traMADol HCl 50 MG Oral Tab, Take 1 tablet (50 mg total) by mouth every 8 (eight) hours as needed for Pain., Disp: 90 tablet, Rfl: 0

## 2021-04-21 RX ORDER — OMEPRAZOLE 40 MG/1
40 CAPSULE, DELAYED RELEASE ORAL DAILY
Qty: 90 CAPSULE | Refills: 0 | Status: SHIPPED | OUTPATIENT
Start: 2021-04-21 | End: 2021-07-14

## 2021-04-21 NOTE — TELEPHONE ENCOUNTER
•  OMEPRAZOLE 40 MG Oral Capsule Delayed Release, TAKE 1 CAPSULE(40 MG) BY MOUTH DAILY, Disp: 90 capsule, Rfl: 3

## 2021-04-22 NOTE — TELEPHONE ENCOUNTER
1st attempt-[employer] message sent to patient to contact the office to schedule appointment.

## 2021-04-26 RX ORDER — BENAZEPRIL HYDROCHLORIDE 40 MG/1
TABLET, FILM COATED ORAL
Qty: 90 TABLET | Refills: 0 | Status: SHIPPED | OUTPATIENT
Start: 2021-04-26 | End: 2021-07-16

## 2021-04-26 RX ORDER — AMLODIPINE BESYLATE 10 MG/1
TABLET ORAL
Qty: 90 TABLET | Refills: 0 | Status: SHIPPED | OUTPATIENT
Start: 2021-04-26 | End: 2021-07-16

## 2021-04-26 NOTE — TELEPHONE ENCOUNTER
Advised patient of 's note. Patient verbalized understanding. Patient states she will call back after she talks to her daughter to see when she can get a ride.

## 2021-05-04 RX ORDER — TRAMADOL HYDROCHLORIDE 50 MG/1
TABLET ORAL
Qty: 90 TABLET | Refills: 0 | OUTPATIENT
Start: 2021-05-04

## 2021-05-07 ENCOUNTER — TELEPHONE (OUTPATIENT)
Dept: INTERNAL MEDICINE CLINIC | Facility: CLINIC | Age: 86
End: 2021-05-07

## 2021-05-07 ENCOUNTER — TELEMEDICINE (OUTPATIENT)
Dept: INTERNAL MEDICINE CLINIC | Facility: CLINIC | Age: 86
End: 2021-05-07
Payer: MEDICARE

## 2021-05-07 DIAGNOSIS — M15.9 PRIMARY OSTEOARTHRITIS INVOLVING MULTIPLE JOINTS: Primary | ICD-10-CM

## 2021-05-07 DIAGNOSIS — E78.5 HYPERLIPIDEMIA, UNSPECIFIED HYPERLIPIDEMIA TYPE: ICD-10-CM

## 2021-05-07 DIAGNOSIS — E55.9 VITAMIN D DEFICIENCY: ICD-10-CM

## 2021-05-07 DIAGNOSIS — I10 ESSENTIAL HYPERTENSION: ICD-10-CM

## 2021-05-07 DIAGNOSIS — E11.9 TYPE 2 DIABETES MELLITUS WITHOUT COMPLICATION, WITHOUT LONG-TERM CURRENT USE OF INSULIN (HCC): ICD-10-CM

## 2021-05-07 DIAGNOSIS — E53.8 VITAMIN B12 DEFICIENCY: ICD-10-CM

## 2021-05-07 PROCEDURE — 99443 PHONE E/M BY PHYS 21-30 MIN: CPT | Performed by: INTERNAL MEDICINE

## 2021-05-07 RX ORDER — TRAMADOL HYDROCHLORIDE 50 MG/1
50 TABLET ORAL EVERY 8 HOURS PRN
Qty: 90 TABLET | Refills: 0 | Status: SHIPPED | OUTPATIENT
Start: 2021-05-07 | End: 2021-06-09

## 2021-05-07 NOTE — TELEPHONE ENCOUNTER
Pt states that she was not able to connect with the doctor. Pt would like a call back at [phone].

## 2021-05-07 NOTE — TELEPHONE ENCOUNTER
Controlled substance  Need OV prior to refill   Last seen a year ago
Dr. Tiffanie Hoskins   Please see message below and advise.
Patient's daughter calling back in per patient's request. Esme Wright can take her mother in to the office on Friday for an in person visit if Dr. Scotty Gordon prefers, but there are no openings on the schedule other than res 24 slots.  Otherwise pt will keep virtual
Pt had video visit schedule for today 05/07/21 .
The patient called and stated she cannot make it into the office at this time and needs her Tramadol. 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 A virtual appointment was made for 5/7/21.
Low Risk (score 7-11)

## 2021-05-07 NOTE — PROGRESS NOTES
Virtual Telephone Check-In    Elisha Humphrey verbally consents to a Virtual/Telephone Check-In visit on 05/07/21. Patient has been referred to the [facility] website at [email or web address] to review the yearly Consent to Treat document.     Patient Mahnaz Jose • Omeprazole 40 MG Oral Capsule Delayed Release Take 1 capsule (40 mg total) by mouth daily. 90 capsule 0   • carvedilol 12.5 MG Oral Tab Take 12.5 mg by mouth 2 (two) times daily with meals.      • torsemide 10 MG Oral Tab Take 1 tablet (10 mg total) by • Heart Disorder Father         mi at 59   • Hypertension Mother         cva at 80      Social History: Social History    Tobacco Use      Smoking status: Former Smoker        Packs/day: 0.20        Years: 3.00        Pack years: .6        Types: Cigaret ordered in this encounter       Imaging & Referrals:  None        EZ#[id number]

## 2021-06-03 ENCOUNTER — ANCILLARY PROCEDURE (OUTPATIENT)
Dept: CARDIOLOGY | Age: 86
End: 2021-06-03
Attending: INTERNAL MEDICINE

## 2021-06-03 DIAGNOSIS — I50.22 CHRONIC SYSTOLIC HEART FAILURE (CMD): ICD-10-CM

## 2021-06-03 PROCEDURE — 93306 TTE W/DOPPLER COMPLETE: CPT | Performed by: INTERNAL MEDICINE

## 2021-06-09 RX ORDER — TRAMADOL HYDROCHLORIDE 50 MG/1
TABLET ORAL
Qty: 90 TABLET | Refills: 0 | Status: SHIPPED | OUTPATIENT
Start: 2021-06-09 | End: 2021-07-14

## 2021-06-10 ENCOUNTER — OFFICE VISIT (OUTPATIENT)
Dept: CARDIOLOGY | Age: 86
End: 2021-06-10

## 2021-06-10 ENCOUNTER — LAB ENCOUNTER (OUTPATIENT)
Dept: LAB | Age: 86
End: 2021-06-10
Attending: INTERNAL MEDICINE
Payer: MEDICARE

## 2021-06-10 VITALS
WEIGHT: 136 LBS | DIASTOLIC BLOOD PRESSURE: 62 MMHG | HEART RATE: 69 BPM | OXYGEN SATURATION: 96 % | BODY MASS INDEX: 25.03 KG/M2 | HEIGHT: 62 IN | SYSTOLIC BLOOD PRESSURE: 130 MMHG

## 2021-06-10 DIAGNOSIS — I50.22 CHRONIC SYSTOLIC HEART FAILURE (CMD): ICD-10-CM

## 2021-06-10 DIAGNOSIS — E78.2 MIXED HYPERLIPIDEMIA: ICD-10-CM

## 2021-06-10 DIAGNOSIS — E53.8 VITAMIN B12 DEFICIENCY: ICD-10-CM

## 2021-06-10 DIAGNOSIS — E55.9 VITAMIN D DEFICIENCY: ICD-10-CM

## 2021-06-10 DIAGNOSIS — E78.5 HYPERLIPIDEMIA, UNSPECIFIED HYPERLIPIDEMIA TYPE: ICD-10-CM

## 2021-06-10 DIAGNOSIS — I10 ESSENTIAL HYPERTENSION: Primary | ICD-10-CM

## 2021-06-10 DIAGNOSIS — E11.9 TYPE 2 DIABETES MELLITUS WITHOUT COMPLICATION, WITHOUT LONG-TERM CURRENT USE OF INSULIN (HCC): ICD-10-CM

## 2021-06-10 DIAGNOSIS — I10 ESSENTIAL HYPERTENSION: ICD-10-CM

## 2021-06-10 PROCEDURE — 99214 OFFICE O/P EST MOD 30 MIN: CPT | Performed by: INTERNAL MEDICINE

## 2021-06-10 PROCEDURE — 80061 LIPID PANEL: CPT

## 2021-06-10 PROCEDURE — 87086 URINE CULTURE/COLONY COUNT: CPT

## 2021-06-10 PROCEDURE — 84439 ASSAY OF FREE THYROXINE: CPT

## 2021-06-10 PROCEDURE — 82746 ASSAY OF FOLIC ACID SERUM: CPT

## 2021-06-10 PROCEDURE — 80053 COMPREHEN METABOLIC PANEL: CPT

## 2021-06-10 PROCEDURE — 82607 VITAMIN B-12: CPT

## 2021-06-10 PROCEDURE — 81015 MICROSCOPIC EXAM OF URINE: CPT

## 2021-06-10 PROCEDURE — 84443 ASSAY THYROID STIM HORMONE: CPT

## 2021-06-10 PROCEDURE — 85027 COMPLETE CBC AUTOMATED: CPT

## 2021-06-10 PROCEDURE — 82306 VITAMIN D 25 HYDROXY: CPT

## 2021-06-10 PROCEDURE — 36415 COLL VENOUS BLD VENIPUNCTURE: CPT

## 2021-06-10 PROCEDURE — 83036 HEMOGLOBIN GLYCOSYLATED A1C: CPT

## 2021-06-10 ASSESSMENT — PATIENT HEALTH QUESTIONNAIRE - PHQ9
2. FEELING DOWN, DEPRESSED OR HOPELESS: NOT AT ALL
CLINICAL INTERPRETATION OF PHQ2 SCORE: NO FURTHER SCREENING NEEDED
SUM OF ALL RESPONSES TO PHQ9 QUESTIONS 1 AND 2: 0
CLINICAL INTERPRETATION OF PHQ9 SCORE: NO FURTHER SCREENING NEEDED
SUM OF ALL RESPONSES TO PHQ9 QUESTIONS 1 AND 2: 0
1. LITTLE INTEREST OR PLEASURE IN DOING THINGS: NOT AT ALL

## 2021-06-15 DIAGNOSIS — R82.90 ABNORMAL URINE FINDING: Primary | ICD-10-CM

## 2021-06-22 ENCOUNTER — NURSE ONLY (OUTPATIENT)
Dept: INTERNAL MEDICINE CLINIC | Facility: CLINIC | Age: 86
End: 2021-06-22
Payer: MEDICARE

## 2021-06-22 DIAGNOSIS — E53.8 B12 DEFICIENCY: Primary | ICD-10-CM

## 2021-06-22 PROCEDURE — 96372 THER/PROPH/DIAG INJ SC/IM: CPT | Performed by: INTERNAL MEDICINE

## 2021-06-22 RX ADMIN — CYANOCOBALAMIN 1000 MCG: 1000 INJECTION INTRAMUSCULAR; SUBCUTANEOUS at 13:57:00

## 2021-06-22 NOTE — PROGRESS NOTES
Patient present to office for nurse visit name and date of birth verified patient received a b12 injection on the right deltoid, pt tolerated well vaccine.

## 2021-06-29 ENCOUNTER — TELEPHONE (OUTPATIENT)
Dept: CARDIOLOGY | Age: 86
End: 2021-06-29

## 2021-07-14 RX ORDER — OMEPRAZOLE 40 MG/1
CAPSULE, DELAYED RELEASE ORAL
Qty: 90 CAPSULE | Refills: 0 | Status: SHIPPED | OUTPATIENT
Start: 2021-07-14 | End: 2021-10-04

## 2021-07-14 RX ORDER — TRAMADOL HYDROCHLORIDE 50 MG/1
TABLET ORAL
Qty: 90 TABLET | Refills: 0 | Status: SHIPPED | OUTPATIENT
Start: 2021-07-14 | End: 2021-08-20

## 2021-07-14 NOTE — TELEPHONE ENCOUNTER
Patient calling and states she was not approved for refill on Tramadol and Omeprazole.  Patient has a lot of pain and is completely out of her Tramadol    Dr. Danica Jerome for your review/ aprroval  Please advise  Thank you  Please reply to pool: EM

## 2021-07-16 RX ORDER — AMLODIPINE BESYLATE 10 MG/1
TABLET ORAL
Qty: 90 TABLET | Refills: 0 | Status: SHIPPED | OUTPATIENT
Start: 2021-07-16 | End: 2021-12-09

## 2021-07-16 RX ORDER — BENAZEPRIL HYDROCHLORIDE 40 MG/1
TABLET, FILM COATED ORAL
Qty: 90 TABLET | Refills: 0 | Status: SHIPPED | OUTPATIENT
Start: 2021-07-16 | End: 2021-12-09

## 2021-07-23 ENCOUNTER — OFFICE VISIT (OUTPATIENT)
Dept: INTERNAL MEDICINE CLINIC | Facility: CLINIC | Age: 86
End: 2021-07-23
Payer: MEDICARE

## 2021-07-23 VITALS
BODY MASS INDEX: 23.74 KG/M2 | HEART RATE: 87 BPM | SYSTOLIC BLOOD PRESSURE: 145 MMHG | DIASTOLIC BLOOD PRESSURE: 69 MMHG | HEIGHT: 63 IN | WEIGHT: 134 LBS

## 2021-07-23 DIAGNOSIS — E11.9 CONTROLLED TYPE 2 DIABETES MELLITUS WITHOUT COMPLICATION, WITHOUT LONG-TERM CURRENT USE OF INSULIN (HCC): ICD-10-CM

## 2021-07-23 DIAGNOSIS — E53.8 B12 DEFICIENCY: ICD-10-CM

## 2021-07-23 DIAGNOSIS — E53.8 VITAMIN B 12 DEFICIENCY: ICD-10-CM

## 2021-07-23 DIAGNOSIS — H91.90 HEARING LOSS, UNSPECIFIED HEARING LOSS TYPE, UNSPECIFIED LATERALITY: ICD-10-CM

## 2021-07-23 DIAGNOSIS — R92.8 ABNORMAL MAMMOGRAM: ICD-10-CM

## 2021-07-23 DIAGNOSIS — H61.23 BILATERAL IMPACTED CERUMEN: ICD-10-CM

## 2021-07-23 DIAGNOSIS — I10 ESSENTIAL HYPERTENSION: Primary | ICD-10-CM

## 2021-07-23 PROCEDURE — 3078F DIAST BP <80 MM HG: CPT | Performed by: INTERNAL MEDICINE

## 2021-07-23 PROCEDURE — 96372 THER/PROPH/DIAG INJ SC/IM: CPT | Performed by: INTERNAL MEDICINE

## 2021-07-23 PROCEDURE — 3077F SYST BP >= 140 MM HG: CPT | Performed by: INTERNAL MEDICINE

## 2021-07-23 PROCEDURE — 3008F BODY MASS INDEX DOCD: CPT | Performed by: INTERNAL MEDICINE

## 2021-07-23 PROCEDURE — 99214 OFFICE O/P EST MOD 30 MIN: CPT | Performed by: INTERNAL MEDICINE

## 2021-07-23 RX ORDER — CYANOCOBALAMIN 1000 UG/ML
1000 INJECTION INTRAMUSCULAR; SUBCUTANEOUS
Status: SHIPPED | OUTPATIENT
Start: 2021-07-23 | End: 2022-07-18

## 2021-07-23 RX ADMIN — CYANOCOBALAMIN 1000 MCG: 1000 INJECTION INTRAMUSCULAR; SUBCUTANEOUS at 16:18:00

## 2021-07-23 NOTE — PROGRESS NOTES
HPI:    Patient ID: Bianka Dumont is a 80year old female.     HPI    HTN  Long standing history of hypertension     sympotms  :        Headache no  dizziness        no                             Blurred vision no  palpitaionsSyncope no  Chest pain  no  PN torsemide 10 MG Oral Tab Take 1 tablet (10 mg total) by mouth daily. 30 tablet 1   • Loperamide HCl 2 MG Oral Cap Take 2 mg by mouth 4 (four) times daily as needed. • Multiple Vitamins-Minerals (MULTI VITAMIN/MINERALS) Oral Tab Take by mouth daily. Former User    Alcohol use: Yes      Alcohol/week: 1.0 standard drinks      Types: 1 Glasses of wine per week      Comment: wine with dinner    Drug use: No       PHYSICAL EXAM:    Physical Exam  Constitutional:       Appearance: She is well-developed.    C 26.0 - 34.0 pg   32.0   MCHC      31.0 - 37.0 g/dL   32.7   RDW      11.0 - 15.0 %   12.7   RDW-SD      35.1 - 46.3 fL   45.4   Platelet Count      583.8 - 450.0 10(3)uL   250.0   Cholesterol, Total      <200 mg/dL   202 (H)   HDL Cholesterol      40 - 59 diabetes mellitus without complication, without long-term current use of insulin ([facility] [address]  Plan:   HGBA1C:    Lab Results   Component Value Date    A1C 6.3 (H) 06/10/2021    A1C 6.9 (H) 03/04/2020    A1C 7.0 (H) 02/12/2019     (H) 06/10/2021     Contro

## 2021-08-01 PROBLEM — I34.0 NONRHEUMATIC MITRAL VALVE REGURGITATION: Status: ACTIVE | Noted: 2020-06-01

## 2021-08-01 PROBLEM — I10 ESSENTIAL HYPERTENSION: Status: ACTIVE | Noted: 2021-08-01

## 2021-08-01 PROBLEM — R06.02 SHORTNESS OF BREATH: Status: RESOLVED | Noted: 2020-03-27 | Resolved: 2021-08-01

## 2021-08-01 PROBLEM — D50.8 IRON DEFICIENCY ANEMIA SECONDARY TO INADEQUATE DIETARY IRON INTAKE: Status: RESOLVED | Noted: 2020-06-08 | Resolved: 2021-08-01

## 2021-08-01 PROBLEM — I50.22 CHRONIC SYSTOLIC HEART FAILURE (HCC): Status: ACTIVE | Noted: 2020-06-01

## 2021-08-03 NOTE — ANESTHESIA POSTPROCEDURE EVALUATION
Patient: Kelley Garcia    Procedure Summary     Date:  02/15/19 Room / Location:  [facility] 01 / [facility]    Anesthesia Start:  2650 Anesthesia Stop:  1640    Procedure:  ESOPHAGOGASTRODUODENOSCOPY (EGD) (N/A ) Diagnosis:       Gastrointestinal he 5

## 2021-08-20 ENCOUNTER — TELEPHONE (OUTPATIENT)
Dept: INTERNAL MEDICINE CLINIC | Facility: CLINIC | Age: 86
End: 2021-08-20

## 2021-08-20 RX ORDER — TRAMADOL HYDROCHLORIDE 50 MG/1
50 TABLET ORAL EVERY 8 HOURS PRN
Qty: 90 TABLET | Refills: 0 | Status: SHIPPED | OUTPATIENT
Start: 2021-08-20 | End: 2021-09-27

## 2021-08-20 RX ORDER — TRAMADOL HYDROCHLORIDE 50 MG/1
50 TABLET ORAL EVERY 8 HOURS PRN
Qty: 10 TABLET | Refills: 0 | Status: SHIPPED | OUTPATIENT
Start: 2021-08-20

## 2021-08-20 NOTE — TELEPHONE ENCOUNTER
On call   received page stating pt needs tramadol   Reviewed chart - temporary refill given to cover for the weekend and then pt to f/u with pcp   Pt should be aware of s/e and possible addictive properties   pls let pt know and then encounter can be close

## 2021-08-20 NOTE — TELEPHONE ENCOUNTER
Please review; protocol failed/no protocol    Requested Prescriptions   Pending Prescriptions Disp Refills    TRAMADOL 50 MG Oral Tab [Pharmacy Med Name: TRAMADOL 50MG TABLETS] 90 tablet 0     Sig: TAKE 1 TABLET(50 MG) BY MOUTH EVERY 8 HOURS AS NEEDED FOR

## 2021-08-21 NOTE — TELEPHONE ENCOUNTER
Advised patient of Dr. Sourav Day note. Patient verbalized understanding  Dr. Veronica Graf sent script for Tramadol 50 mg, 90 tabs yesterday.    Called [facility] (spoke to [place])  and cancelled script that was sent by Dr. Fritz Amado for 10 tablets since patient r

## 2021-09-27 RX ORDER — TRAMADOL HYDROCHLORIDE 50 MG/1
TABLET ORAL
Qty: 90 TABLET | Refills: 0 | Status: SHIPPED | OUTPATIENT
Start: 2021-09-27 | End: 2021-11-03

## 2021-10-04 RX ORDER — OMEPRAZOLE 40 MG/1
40 CAPSULE, DELAYED RELEASE ORAL DAILY
Qty: 90 CAPSULE | Refills: 1 | Status: SHIPPED | OUTPATIENT
Start: 2021-10-04 | End: 2022-02-14

## 2021-10-04 NOTE — TELEPHONE ENCOUNTER
[facility].     Requested Prescriptions   Pending Prescriptions Disp Refills    OMEPRAZOLE 40 MG Oral Capsule Delayed Release [Pharmacy Med Name: OMEPRAZOLE 40MG CAPSULES] 90 capsule 0     Sig: TAKE 1 CAPSULE(40 MG) BY MOUTH DAILY        Gastrointestional Medication Protocol Passed - 10/3/2021  6:42 PM        Passed - Appointment in past 12 or next 3 months              Recent Outpatient Visits              2 months ago Essential hypertension    Charley Dandy, MD    Office Visit    3 months ago B12 deficiency    [facility] [address] [facility]    [facility] Only    5 months ago Primary osteoarthritis involving multiple joints    [facility], [address] Jerry MD    Telemedicine    7 months ago Encounter for vaccination    [facility] [address]    Nurse Only    7 months ago Encounter for vaccination    [facility] [address], [place]    Nurse Only            Future Appointments         Provider Department Appt Notes    In 5 days [address], [facility] Vaccine

## 2021-11-03 ENCOUNTER — TELEPHONE (OUTPATIENT)
Dept: CARDIOLOGY | Age: 86
End: 2021-11-03

## 2021-11-03 NOTE — TELEPHONE ENCOUNTER
Please review. Protocol Failed / No Protocol.     Requested Prescriptions   Pending Prescriptions Disp Refills    TRAMADOL 50 MG Oral Tab [Pharmacy Med Name: TRAMADOL 50MG TABLETS] 90 tablet 0     Sig: TAKE 1 TABLET(50 MG) BY MOUTH EVERY 8 HOURS AS NEEDED

## 2021-11-04 RX ORDER — TRAMADOL HYDROCHLORIDE 50 MG/1
50 TABLET ORAL EVERY 8 HOURS PRN
Qty: 90 TABLET | Refills: 0 | Status: SHIPPED | OUTPATIENT
Start: 2021-11-04 | End: 2021-12-09

## 2021-12-09 RX ORDER — AMLODIPINE BESYLATE 10 MG/1
TABLET ORAL
Qty: 90 TABLET | Refills: 0 | Status: SHIPPED | OUTPATIENT
Start: 2021-12-09

## 2021-12-09 RX ORDER — TRAMADOL HYDROCHLORIDE 50 MG/1
TABLET ORAL
Qty: 90 TABLET | Refills: 0 | Status: SHIPPED | OUTPATIENT
Start: 2021-12-09 | End: 2022-01-05

## 2021-12-09 RX ORDER — BENAZEPRIL HYDROCHLORIDE 40 MG/1
TABLET, FILM COATED ORAL
Qty: 90 TABLET | Refills: 0 | Status: SHIPPED | OUTPATIENT
Start: 2021-12-09

## 2022-01-05 RX ORDER — TRAMADOL HYDROCHLORIDE 50 MG/1
TABLET ORAL
Qty: 90 TABLET | Refills: 0 | Status: SHIPPED | OUTPATIENT
Start: 2022-01-05

## 2022-02-14 RX ORDER — OMEPRAZOLE 40 MG/1
CAPSULE, DELAYED RELEASE ORAL
Qty: 90 CAPSULE | Refills: 1 | Status: SHIPPED | OUTPATIENT
Start: 2022-02-14

## 2022-02-14 RX ORDER — TRAMADOL HYDROCHLORIDE 50 MG/1
50 TABLET ORAL EVERY 8 HOURS PRN
Qty: 90 TABLET | Refills: 0 | Status: SHIPPED | OUTPATIENT
Start: 2022-02-14 | End: 2022-03-21

## 2022-02-14 RX ORDER — AMLODIPINE BESYLATE 10 MG/1
10 TABLET ORAL DAILY
Qty: 90 TABLET | Refills: 0 | Status: SHIPPED | OUTPATIENT
Start: 2022-02-14

## 2022-02-14 NOTE — TELEPHONE ENCOUNTER
Please review. Protocol failed / No protocol.    Requested Prescriptions   Pending Prescriptions Disp Refills    TRAMADOL 50 MG Oral Tab [Pharmacy Med Name: TRAMADOL 50MG TABLETS] 90 tablet 0     Sig: TAKE 1 TABLET(50 MG) BY MOUTH EVERY 8 HOURS AS NEEDED FOR PAIN        There is no refill protocol information for this order        AMLODIPINE 10 MG Oral Tab [Pharmacy Med Name: AMLODIPINE BESYLATE 10MG TABLETS] 90 tablet 0     Sig: TAKE 1 TABLET(10 MG) BY MOUTH DAILY        Hypertensive Medications Protocol Failed - 2/11/2022  5:32 PM        Failed - Appointment in past 6 or next 3 months        Passed - CMP or BMP in past 12 months        Passed - GFR Non- > 50     Lab Results   Component Value Date    GFRNAA 57 (L) 06/10/2021                    OMEPRAZOLE 40 MG Oral Capsule Delayed Release [Pharmacy Med Name: OMEPRAZOLE 40MG CAPSULES] 90 capsule 1     Sig: TAKE 1 CAPSULE(40 MG) BY MOUTH DAILY        Gastrointestional Medication Protocol Passed - 2/11/2022  5:32 PM        Passed - Appointment in past 12 or next 3 months                Recent Outpatient Visits              4 months ago Encounter for vaccination    [facility]    Nurse Only    6 months ago Essential hypertension    Cheryle Mech, MD    Office Visit    7 months ago B12 deficiency    [facility], [facility], [address] [facility]    Nurse Only    9 months ago Primary osteoarthritis involving multiple joints    [facility], [facility], [address] MD    Telemedicine    11 months ago Encounter for vaccination    [facility] [address]    Nurse Only

## 2022-02-14 NOTE — TELEPHONE ENCOUNTER
Please review. Protocol failed / No protocol. Requested Prescriptions   Pending Prescriptions Disp Refills    traMADol 50 MG Oral Tab [Pharmacy Med Name: TRAMADOL 50MG TABLETS] 90 tablet 0     Sig: Take 1 tablet (50 mg total) by mouth every 8 (eight) hours as needed for Pain. There is no refill protocol information for this order       Signed Prescriptions Disp Refills    amLODIPine 10 MG Oral Tab 90 tablet 0     Sig: Take 1 tablet (10 mg total) by mouth daily.         Hypertensive Medications Protocol Failed - 2/11/2022  5:32 PM        Failed - Appointment in past 6 or next 3 months        Passed - CMP or BMP in past 12 months        Passed - GFR Non- > 50     Lab Results   Component Value Date    GFRNAA 57 (L) 06/10/2021                    OMEPRAZOLE 40 MG Oral Capsule Delayed Release 90 capsule 1     Sig: TAKE 1 CAPSULE(40 MG) BY MOUTH DAILY        Gastrointestional Medication Protocol Passed - 2/11/2022  5:32 PM        Passed - Appointment in past 12 or next 3 months                Recent Outpatient Visits              4 months ago Encounter for vaccination    [place], [address]    Nurse Only    6 months ago Essential hypertension    Huan Roy MD    Office Visit    7 months ago B12 deficiency    [place] [employer] [place], [facility], [address]    Nurse Only    9 months ago Primary osteoarthritis involving multiple joints    [facility], [facility], [address] MD    Telemedicine    11 months ago Encounter for vaccination    [facility] [address], [place]    Nurse Only

## 2022-02-14 NOTE — TELEPHONE ENCOUNTER
Please review. Protocol failed / No protocol. Requested Prescriptions   Pending Prescriptions Disp Refills    TRAMADOL 50 MG Oral Tab [Pharmacy Med Name: TRAMADOL 50MG TABLETS] 90 tablet 0     Sig: TAKE 1 TABLET(50 MG) BY MOUTH EVERY 8 HOURS AS NEEDED FOR PAIN        There is no refill protocol information for this order       Signed Prescriptions Disp Refills    amLODIPine 10 MG Oral Tab 90 tablet 0     Sig: Take 1 tablet (10 mg total) by mouth daily.         Hypertensive Medications Protocol Failed - 2/11/2022  5:32 PM        Failed - Appointment in past 6 or next 3 months        Passed - CMP or BMP in past 12 months        Passed - GFR Non- > 50     Lab Results   Component Value Date    GFRNAA 57 (L) 06/10/2021                    OMEPRAZOLE 40 MG Oral Capsule Delayed Release 90 capsule 1     Sig: TAKE 1 CAPSULE(40 MG) BY MOUTH DAILY        Gastrointestional Medication Protocol Passed - 2/11/2022  5:32 PM        Passed - Appointment in past 12 or next 3 months                Recent Outpatient Visits              4 months ago Encounter for vaccination    [facility]    Nurse Only    6 months ago Essential hypertension    Luis Williamson MD    Office Visit    7 months ago B12 deficiency    [address], [place] [facility]    Nurse Only    9 months ago Primary osteoarthritis involving multiple joints    [address] J[address] Rickers, MD    Telemedicine    11 months ago Encounter for vaccination    [facility] [address]    Nurse Only

## 2022-03-21 RX ORDER — TRAMADOL HYDROCHLORIDE 50 MG/1
50 TABLET ORAL EVERY 8 HOURS PRN
Qty: 90 TABLET | Refills: 0 | Status: SHIPPED | OUTPATIENT
Start: 2022-03-21

## 2022-03-22 NOTE — TELEPHONE ENCOUNTER
Please review refill protocol failed/ no protocol  Requested Prescriptions   Pending Prescriptions Disp Refills    TRAMADOL 50 MG Oral Tab [Pharmacy Med Name: TRAMADOL 50MG TABLETS] 90 tablet 0     Sig: TAKE 1 TABLET(50 MG) BY MOUTH EVERY 8 HOURS AS NEEDED FOR PAIN        There is no refill protocol information for this order

## 2022-04-08 RX ORDER — TRAMADOL HYDROCHLORIDE 50 MG/1
TABLET ORAL
Qty: 90 TABLET | Refills: 0 | OUTPATIENT
Start: 2022-04-08

## 2022-04-08 NOTE — TELEPHONE ENCOUNTER
[address] [facility] [id number] [facility], [place] - [address], [phone], [phone]        Disp Refills Start End    traMADol 50 MG Oral Tab 90 tablet 0 3/21/2022     Sig - Route: Take 1 tablet (50 mg total) by mouth every 8 (eight) hours as needed for Pain. - Oral    Sent to pharmacy as: traMADol HCl 50 MG Oral Tablet Albino Jose Angel)    E-Prescribing Status: Receipt confirmed by pharmacy (3/21/2022 11:47 PM CDT)       Laverne Layton indicated that patient just refilled the tramadol on 3/24/2022 too early for request. She will contact the patient.

## 2022-04-18 ENCOUNTER — APPOINTMENT (OUTPATIENT)
Dept: CV DIAGNOSTICS | Facility: HOSPITAL | Age: 87
End: 2022-04-18
Attending: HOSPITALIST
Payer: MEDICARE

## 2022-04-18 ENCOUNTER — APPOINTMENT (OUTPATIENT)
Dept: GENERAL RADIOLOGY | Facility: HOSPITAL | Age: 87
End: 2022-04-18
Attending: EMERGENCY MEDICINE
Payer: MEDICARE

## 2022-04-18 ENCOUNTER — HOSPITAL ENCOUNTER (INPATIENT)
Facility: HOSPITAL | Age: 87
LOS: 2 days | Discharge: HOME | End: 2022-04-20
Attending: EMERGENCY MEDICINE | Admitting: HOSPITALIST
Payer: MEDICARE

## 2022-04-18 DIAGNOSIS — J98.01 BRONCHOSPASM: ICD-10-CM

## 2022-04-18 DIAGNOSIS — R06.89 RESPIRATORY INSUFFICIENCY: Primary | ICD-10-CM

## 2022-04-18 DIAGNOSIS — I50.43 ACUTE ON CHRONIC COMBINED SYSTOLIC AND DIASTOLIC CONGESTIVE HEART FAILURE (HCC): ICD-10-CM

## 2022-04-18 LAB
ANION GAP SERPL CALC-SCNC: 5 MMOL/L (ref 0–18)
BASOPHILS # BLD AUTO: 0.04 X10(3) UL (ref 0–0.2)
BASOPHILS NFR BLD AUTO: 0.7 %
BUN BLD-MCNC: 35 MG/DL (ref 7–18)
BUN/CREAT SERPL: 39.8 (ref 10–20)
CALCIUM BLD-MCNC: 8.7 MG/DL (ref 8.5–10.1)
CHLORIDE SERPL-SCNC: 110 MMOL/L (ref 98–112)
CHOLEST SERPL-MCNC: 141 MG/DL (ref ?–200)
CO2 SERPL-SCNC: 24 MMOL/L (ref 21–32)
CREAT BLD-MCNC: 0.88 MG/DL
DEPRECATED RDW RBC AUTO: 46.3 FL (ref 35.1–46.3)
EOSINOPHIL # BLD AUTO: 0.26 X10(3) UL (ref 0–0.7)
EOSINOPHIL NFR BLD AUTO: 4.6 %
ERYTHROCYTE [DISTWIDTH] IN BLOOD BY AUTOMATED COUNT: 12.8 % (ref 11–15)
GLUCOSE BLD-MCNC: 173 MG/DL (ref 70–99)
GLUCOSE BLDC GLUCOMTR-MCNC: 158 MG/DL (ref 70–99)
GLUCOSE BLDC GLUCOMTR-MCNC: 180 MG/DL (ref 70–99)
HCT VFR BLD AUTO: 32.7 %
HDLC SERPL-MCNC: 63 MG/DL (ref 40–59)
HGB BLD-MCNC: 10.4 G/DL
IMM GRANULOCYTES # BLD AUTO: 0.02 X10(3) UL (ref 0–1)
IMM GRANULOCYTES NFR BLD: 0.4 %
LDLC SERPL CALC-MCNC: 65 MG/DL (ref ?–100)
LYMPHOCYTES # BLD AUTO: 1.49 X10(3) UL (ref 1–4)
LYMPHOCYTES NFR BLD AUTO: 26.2 %
MCH RBC QN AUTO: 31.4 PG (ref 26–34)
MCHC RBC AUTO-ENTMCNC: 31.8 G/DL (ref 31–37)
MCV RBC AUTO: 98.8 FL
MONOCYTES # BLD AUTO: 0.58 X10(3) UL (ref 0.1–1)
MONOCYTES NFR BLD AUTO: 10.2 %
NEUTROPHILS # BLD AUTO: 3.3 X10 (3) UL (ref 1.5–7.7)
NEUTROPHILS # BLD AUTO: 3.3 X10(3) UL (ref 1.5–7.7)
NEUTROPHILS NFR BLD AUTO: 57.9 %
NONHDLC SERPL-MCNC: 78 MG/DL (ref ?–130)
NT-PROBNP SERPL-MCNC: 3079 PG/ML (ref ?–450)
OSMOLALITY SERPL CALC.SUM OF ELEC: 300 MOSM/KG (ref 275–295)
PLATELET # BLD AUTO: 255 10(3)UL (ref 150–450)
POTASSIUM SERPL-SCNC: 4 MMOL/L (ref 3.5–5.1)
PROCALCITONIN SERPL-MCNC: 0.06 NG/ML (ref ?–0.16)
RBC # BLD AUTO: 3.31 X10(6)UL
SARS-COV-2 RNA RESP QL NAA+PROBE: NOT DETECTED
SODIUM SERPL-SCNC: 139 MMOL/L (ref 136–145)
TRIGL SERPL-MCNC: 60 MG/DL (ref 30–149)
TROPONIN I HIGH SENSITIVITY: 162 NG/L
TROPONIN I HIGH SENSITIVITY: 221 NG/L
TROPONIN I HIGH SENSITIVITY: 489 NG/L
VLDLC SERPL CALC-MCNC: 9 MG/DL (ref 0–30)
WBC # BLD AUTO: 5.7 X10(3) UL (ref 4–11)

## 2022-04-18 PROCEDURE — 93306 TTE W/DOPPLER COMPLETE: CPT | Performed by: HOSPITALIST

## 2022-04-18 PROCEDURE — 71045 X-RAY EXAM CHEST 1 VIEW: CPT | Performed by: EMERGENCY MEDICINE

## 2022-04-18 PROCEDURE — 99223 1ST HOSP IP/OBS HIGH 75: CPT | Performed by: HOSPITALIST

## 2022-04-18 RX ORDER — HEPARIN SODIUM 5000 [USP'U]/ML
5000 INJECTION, SOLUTION INTRAVENOUS; SUBCUTANEOUS EVERY 12 HOURS SCHEDULED
Status: DISCONTINUED | OUTPATIENT
Start: 2022-04-18 | End: 2022-04-20

## 2022-04-18 RX ORDER — CARVEDILOL 12.5 MG/1
12.5 TABLET ORAL 2 TIMES DAILY WITH MEALS
Status: DISCONTINUED | OUTPATIENT
Start: 2022-04-18 | End: 2022-04-20

## 2022-04-18 RX ORDER — METOCLOPRAMIDE HYDROCHLORIDE 5 MG/ML
5 INJECTION INTRAMUSCULAR; INTRAVENOUS EVERY 8 HOURS PRN
Status: DISCONTINUED | OUTPATIENT
Start: 2022-04-18 | End: 2022-04-20

## 2022-04-18 RX ORDER — AMLODIPINE BESYLATE 10 MG/1
10 TABLET ORAL DAILY
Status: DISCONTINUED | OUTPATIENT
Start: 2022-04-18 | End: 2022-04-20

## 2022-04-18 RX ORDER — ONDANSETRON 2 MG/ML
4 INJECTION INTRAMUSCULAR; INTRAVENOUS ONCE
Status: COMPLETED | OUTPATIENT
Start: 2022-04-18 | End: 2022-04-18

## 2022-04-18 RX ORDER — ASPIRIN 325 MG
325 TABLET ORAL DAILY
Status: DISCONTINUED | OUTPATIENT
Start: 2022-04-18 | End: 2022-04-19

## 2022-04-18 RX ORDER — FUROSEMIDE 10 MG/ML
40 INJECTION INTRAMUSCULAR; INTRAVENOUS ONCE
Status: COMPLETED | OUTPATIENT
Start: 2022-04-18 | End: 2022-04-18

## 2022-04-18 RX ORDER — PANTOPRAZOLE SODIUM 40 MG/1
40 TABLET, DELAYED RELEASE ORAL
Status: DISCONTINUED | OUTPATIENT
Start: 2022-04-19 | End: 2022-04-20

## 2022-04-18 RX ORDER — ONDANSETRON 2 MG/ML
4 INJECTION INTRAMUSCULAR; INTRAVENOUS EVERY 6 HOURS PRN
Status: DISCONTINUED | OUTPATIENT
Start: 2022-04-18 | End: 2022-04-20

## 2022-04-18 RX ORDER — LOPERAMIDE HYDROCHLORIDE 2 MG/1
2 CAPSULE ORAL 4 TIMES DAILY PRN
Status: DISCONTINUED | OUTPATIENT
Start: 2022-04-18 | End: 2022-04-20

## 2022-04-18 RX ORDER — NITROGLYCERIN 0.4 MG/1
0.4 TABLET SUBLINGUAL EVERY 5 MIN PRN
Status: DISCONTINUED | OUTPATIENT
Start: 2022-04-18 | End: 2022-04-20

## 2022-04-18 RX ORDER — LISINOPRIL 40 MG/1
40 TABLET ORAL NIGHTLY
Status: DISCONTINUED | OUTPATIENT
Start: 2022-04-18 | End: 2022-04-20

## 2022-04-18 RX ORDER — TRAMADOL HYDROCHLORIDE 50 MG/1
50 TABLET ORAL EVERY 8 HOURS PRN
Status: DISCONTINUED | OUTPATIENT
Start: 2022-04-18 | End: 2022-04-20

## 2022-04-18 RX ORDER — ACETAMINOPHEN 325 MG/1
650 TABLET ORAL EVERY 6 HOURS PRN
Status: DISCONTINUED | OUTPATIENT
Start: 2022-04-18 | End: 2022-04-20

## 2022-04-18 RX ORDER — ASPIRIN 81 MG/1
324 TABLET, CHEWABLE ORAL ONCE
Status: COMPLETED | OUTPATIENT
Start: 2022-04-18 | End: 2022-04-18

## 2022-04-18 RX ORDER — IPRATROPIUM BROMIDE AND ALBUTEROL SULFATE 2.5; .5 MG/3ML; MG/3ML
3 SOLUTION RESPIRATORY (INHALATION) EVERY 6 HOURS PRN
Status: DISCONTINUED | OUTPATIENT
Start: 2022-04-18 | End: 2022-04-20

## 2022-04-18 RX ORDER — FUROSEMIDE 10 MG/ML
40 INJECTION INTRAMUSCULAR; INTRAVENOUS
Status: DISCONTINUED | OUTPATIENT
Start: 2022-04-18 | End: 2022-04-19

## 2022-04-18 NOTE — ED INITIAL ASSESSMENT (HPI)
Pt arrived per EMS from home. Pt woke up with shortness of breath. Pt was 83% on RA. EMS placed placed on 6L and gave 2 neb treatments. Pt continues to be 87% on RA.

## 2022-04-18 NOTE — SPIRITUAL CARE NOTE
The  initiated Pastoral Care visit. The Pt was reclined in bed. RN was at bedside taking vitals. Pt was on oxygen. Pt is alert and oriented and able to articulate her needs. The Pt stated that she feels that she is doing better since this morning. Pt shared that she lives with her daughter and family  Is supportive. The Pt was in the process of being transported for [facility]    Pt stated that she has no needs at this time. The  provided calm non anxious presence and active listening. No further follow up needed at this time.     Jaydon Calero  Resident   Ext. [phone]

## 2022-04-18 NOTE — H&P
[facility]    PATIENT'S NAME: Ana Paula Pineda   ATTENDING PHYSICIAN: Miriam Bunn MD   PATIENT ACCOUNT#:   [de-identified]    LOCATION:  [address]  MEDICAL RECORD #:   [id number]       YOB: 1932  ADMISSION DATE:       04/18/2022    HISTORY AND PHYSICAL EXAMINATION    CHIEF COMPLAINT:  Acute on chronic diastolic heart failure, acute hypoxemic respiratory failure. HISTORY OF PRESENT ILLNESS:  The patient is an 15-year-old  female with a known underlying chronic diastolic heart failure who came in today to the emergency room for evaluation of progressive wheezing, dyspnea on exertion, and orthopnea. CBC was unremarkable. Chemistry showed GFR of 58, which is below her baseline. Troponin 162. EKG showed sinus tachycardia and nonspecific ST-T changes. ProBNP 3000. Chest x-ray showed cardiomegaly with heart failure changes. Was started on IV Lasix in the emergency room. She will be admitted to the hospital for further management. PAST MEDICAL HISTORY:  Chronic left ventricular diastolic dysfunction with initial diagnosis in 2020. At that time ejection fraction was 45% to 50%. Started on medical treatment at that time, and her ejection fraction improved to 50%. Diabetes mellitus type 2, non-insulin-dependent; osteoarthritis; hypertension; hyperlipidemia; gastroesophageal reflux disease; peptic ulcer disease; anemia of iron deficiency. PAST SURGICAL HISTORY:  Endometrial cancer, status post total abdominal hysterectomy and bilateral salpingo-oophorectomy, followed by radiation. Ileal radiation enteritis, requiring distal ileal resection. Umbilical hernia repair, cataract procedure, and appendectomy. ALLERGIES:  No known drug allergies. FAMILY HISTORY:  Mother had cerebrovascular accident and hypertension. Father had coronary artery disease. SOCIAL HISTORY:  Ex-tobacco user. No current tobacco, alcohol, or drug use. Lives with her family.   At baseline independent for basic activities of daily living. REVIEW OF SYSTEMS:  The patient reported noncompliance with low-salt diet nor her diuretics. For the last few days, has been having progressive dyspnea on exertion, occasional wheezing, orthopnea. Symptoms became much worse this morning, and she decided to come into the emergency room for evaluation. Denies any chest pain. No fever or chills. No cough. Other 12-point review of systems is negative. PHYSICAL EXAMINATION:    GENERAL:  Alert and oriented to time, place, and person. Visibly dyspneic. VITAL SIGNS:  Temperature 98.5, pulse 99, respiratory rate 24, blood pressure 171/81, pulse ox 88% on room air. HEENT:  Atraumatic. Oropharynx clear. Moist mucous membranes. Normal hard and soft palate. Eyes:  Anicteric sclerae. NECK:  Supple. No lymphadenopathy. Positive jugular venous distention. Trachea midline. LUNGS:  Crackles and wheezes auscultated in both lung fields. Increased respiratory effort and accessory muscle use. HEART:  Regular rate and rhythm. S1 and S2 auscultated. No murmur. ABDOMEN:  Soft, nondistended. No tenderness. EXTREMITIES:  Edema +2, both legs. No clubbing or cyanosis. NEUROLOGIC:  Motor and sensory intact. ASSESSMENT:    1. Acute on chronic diastolic heart failure. 2.   Acute hypoxemic respiratory failure. 3.   Abnormal troponin; no chest pain. Continue to monitor. 4.   Hypertension. 5.   Diabetes mellitus type 2. PLAN:  The patient will be admitted to telemetry floor. IV Lasix. Obtain 2D echocardiogram with Doppler. Monitor her electrolytes, hemodynamic status, and monitor her kidney function. Trend her troponins, and ischemic workup. Cardiology consult was notified. Further recommendations to follow.       Dictated By Cheyenne Seymour MD  d: 04/18/2022 11:13:01  t: 04/18/2022 11:22:20  Job [id number]  FB/

## 2022-04-18 NOTE — ED QUICK NOTES
Orders for admission, patient is aware of plan and ready to go upstairs. Any questions, please call ED RN Kelsey Host  at extension [phone].    Type of COVID test sent:rapid  COVID Suspicion level: neg    Titratable drug(s) infusing:n/a  Rate:    LOC at time of transport:A&O X4    Other pertinent informationn/a    CIWA score=n/a  NIH score=n/a

## 2022-04-19 ENCOUNTER — APPOINTMENT (OUTPATIENT)
Dept: INTERVENTIONAL RADIOLOGY/VASCULAR | Facility: HOSPITAL | Age: 87
End: 2022-04-19
Payer: MEDICARE

## 2022-04-19 ENCOUNTER — TELEPHONE (OUTPATIENT)
Dept: INTERNAL MEDICINE CLINIC | Facility: CLINIC | Age: 87
End: 2022-04-19

## 2022-04-19 LAB
ANION GAP SERPL CALC-SCNC: 8 MMOL/L (ref 0–18)
BASOPHILS # BLD AUTO: 0.03 X10(3) UL (ref 0–0.2)
BASOPHILS NFR BLD AUTO: 0.5 %
BUN BLD-MCNC: 22 MG/DL (ref 7–18)
BUN/CREAT SERPL: 32.4 (ref 10–20)
CALCIUM BLD-MCNC: 8.3 MG/DL (ref 8.5–10.1)
CHLORIDE SERPL-SCNC: 107 MMOL/L (ref 98–112)
CO2 SERPL-SCNC: 29 MMOL/L (ref 21–32)
CREAT BLD-MCNC: 0.68 MG/DL
DEPRECATED RDW RBC AUTO: 44.5 FL (ref 35.1–46.3)
EOSINOPHIL # BLD AUTO: 0.15 X10(3) UL (ref 0–0.7)
EOSINOPHIL NFR BLD AUTO: 2.6 %
ERYTHROCYTE [DISTWIDTH] IN BLOOD BY AUTOMATED COUNT: 12.6 % (ref 11–15)
GLUCOSE BLD-MCNC: 101 MG/DL (ref 70–99)
GLUCOSE BLDC GLUCOMTR-MCNC: 124 MG/DL (ref 70–99)
GLUCOSE BLDC GLUCOMTR-MCNC: 175 MG/DL (ref 70–99)
HCT VFR BLD AUTO: 26.6 %
HGB BLD-MCNC: 8.9 G/DL
IMM GRANULOCYTES # BLD AUTO: 0.01 X10(3) UL (ref 0–1)
IMM GRANULOCYTES NFR BLD: 0.2 %
LYMPHOCYTES # BLD AUTO: 0.92 X10(3) UL (ref 1–4)
LYMPHOCYTES NFR BLD AUTO: 16.1 %
MAGNESIUM SERPL-MCNC: 1.3 MG/DL (ref 1.6–2.6)
MCH RBC QN AUTO: 32.1 PG (ref 26–34)
MCHC RBC AUTO-ENTMCNC: 33.5 G/DL (ref 31–37)
MCV RBC AUTO: 96 FL
MONOCYTES # BLD AUTO: 0.64 X10(3) UL (ref 0.1–1)
MONOCYTES NFR BLD AUTO: 11.2 %
NEUTROPHILS # BLD AUTO: 3.96 X10 (3) UL (ref 1.5–7.7)
NEUTROPHILS # BLD AUTO: 3.96 X10(3) UL (ref 1.5–7.7)
NEUTROPHILS NFR BLD AUTO: 69.4 %
OSMOLALITY SERPL CALC.SUM OF ELEC: 301 MOSM/KG (ref 275–295)
PLATELET # BLD AUTO: 199 10(3)UL (ref 150–450)
POTASSIUM SERPL-SCNC: 3.2 MMOL/L (ref 3.5–5.1)
POTASSIUM SERPL-SCNC: 3.6 MMOL/L (ref 3.5–5.1)
RBC # BLD AUTO: 2.77 X10(6)UL
SODIUM SERPL-SCNC: 144 MMOL/L (ref 136–145)
TROPONIN I HIGH SENSITIVITY: 438 NG/L
WBC # BLD AUTO: 5.7 X10(3) UL (ref 4–11)

## 2022-04-19 PROCEDURE — 4A023N8 MEASUREMENT OF CARDIAC SAMPLING AND PRESSURE, BILATERAL, PERCUTANEOUS APPROACH: ICD-10-PCS | Performed by: INTERNAL MEDICINE

## 2022-04-19 PROCEDURE — B2111ZZ FLUOROSCOPY OF MULTIPLE CORONARY ARTERIES USING LOW OSMOLAR CONTRAST: ICD-10-PCS | Performed by: INTERNAL MEDICINE

## 2022-04-19 PROCEDURE — B41F1ZZ FLUOROSCOPY OF RIGHT LOWER EXTREMITY ARTERIES USING LOW OSMOLAR CONTRAST: ICD-10-PCS | Performed by: INTERNAL MEDICINE

## 2022-04-19 PROCEDURE — B2151ZZ FLUOROSCOPY OF LEFT HEART USING LOW OSMOLAR CONTRAST: ICD-10-PCS | Performed by: INTERNAL MEDICINE

## 2022-04-19 PROCEDURE — 99233 SBSQ HOSP IP/OBS HIGH 50: CPT | Performed by: HOSPITALIST

## 2022-04-19 RX ORDER — TORSEMIDE 5 MG/1
10 TABLET ORAL DAILY
Status: DISCONTINUED | OUTPATIENT
Start: 2022-04-19 | End: 2022-04-20

## 2022-04-19 RX ORDER — POTASSIUM CHLORIDE 20 MEQ/1
40 TABLET, EXTENDED RELEASE ORAL EVERY 4 HOURS
Status: COMPLETED | OUTPATIENT
Start: 2022-04-19 | End: 2022-04-19

## 2022-04-19 RX ORDER — CHLORHEXIDINE GLUCONATE 4 G/100ML
30 SOLUTION TOPICAL
Status: ACTIVE | OUTPATIENT
Start: 2022-04-20 | End: 2022-04-20

## 2022-04-19 RX ORDER — POTASSIUM CHLORIDE 20 MEQ/1
40 TABLET, EXTENDED RELEASE ORAL EVERY 4 HOURS
Status: COMPLETED | OUTPATIENT
Start: 2022-04-19 | End: 2022-04-20

## 2022-04-19 RX ORDER — SODIUM CHLORIDE 9 MG/ML
INJECTION, SOLUTION INTRAVENOUS
Status: ACTIVE | OUTPATIENT
Start: 2022-04-20 | End: 2022-04-20

## 2022-04-19 RX ORDER — LIDOCAINE HYDROCHLORIDE 20 MG/ML
INJECTION, SOLUTION EPIDURAL; INFILTRATION; INTRACAUDAL; PERINEURAL
Status: COMPLETED
Start: 2022-04-19 | End: 2022-04-19

## 2022-04-19 RX ORDER — MIDAZOLAM HYDROCHLORIDE 1 MG/ML
INJECTION INTRAMUSCULAR; INTRAVENOUS
Status: COMPLETED
Start: 2022-04-19 | End: 2022-04-19

## 2022-04-19 RX ORDER — MAGNESIUM OXIDE 400 MG (241.3 MG MAGNESIUM) TABLET
800 TABLET ONCE
Status: COMPLETED | OUTPATIENT
Start: 2022-04-19 | End: 2022-04-19

## 2022-04-19 NOTE — PLAN OF CARE
Problem: Patient Centered Care  Goal: Patient preferences are identified and integrated in the patient's plan of care  Description: Interventions:  - What would you like us to know as we care for you?  Im from home   - Provide timely, complete, and accurate information to patient/family  - Incorporate patient and family knowledge, values, beliefs, and cultural backgrounds into the planning and delivery of care  - Encourage patient/family to participate in care and decision-making at the level they choose  - Honor patient and family perspectives and choices  Outcome: Progressing     Problem: Patient/Family Goals  Goal: Patient/Family Long Term Goal  Description: Patient's Long Term Goal:Get stronger     Interventions:  - See additional Care Plan goals for specific interventions  Outcome: Progressing  Goal: Patient/Family Short Term Goal  Description: Patient's Short Term Goal: Go home     Interventions:   - See additional Care Plan goals for specific interventions  Outcome: Progressing     Problem: PAIN - ADULT  Goal: Verbalizes/displays adequate comfort level or patient's stated pain goal  Description: INTERVENTIONS:  - Encourage pt to monitor pain and request assistance  - Assess pain using appropriate pain scale  - Administer analgesics based on type and severity of pain and evaluate response  - Implement non-pharmacological measures as appropriate and evaluate response  - Consider cultural and social influences on pain and pain management  - Manage/alleviate anxiety  - Utilize distraction and/or relaxation techniques  - Monitor for opioid side effects  - Notify MD/LIP if interventions unsuccessful or patient reports new pain  - Anticipate increased pain with activity and pre-medicate as appropriate  Outcome: Progressing     Problem: RISK FOR INFECTION - ADULT  Goal: Absence of fever/infection during anticipated neutropenic period  Description: INTERVENTIONS  - Monitor WBC  - Administer growth factors as ordered  - Implement neutropenic guidelines  Outcome: Progressing     Problem: SAFETY ADULT - FALL  Goal: Free from fall injury  Description: INTERVENTIONS:  - Assess pt frequently for physical needs  - Identify cognitive and physical deficits and behaviors that affect risk of falls.   - [place] fall precautions as indicated by assessment.  - Educate pt/family on patient safety including physical limitations  - Instruct pt to call for assistance with activity based on assessment  - Modify environment to reduce risk of injury  - Provide assistive devices as appropriate  - Consider OT/PT consult to assist with strengthening/mobility  - Encourage toileting schedule  Outcome: Progressing     Problem: DISCHARGE PLANNING  Goal: Discharge to home or other facility with appropriate resources  Description: INTERVENTIONS:  - Identify barriers to discharge w/pt and caregiver  - Include patient/family/discharge partner in discharge planning  - Arrange for needed discharge resources and transportation as appropriate  - Identify discharge learning needs (meds, wound care, etc)  - Arrange for interpreters to assist at discharge as needed  - Consider post-discharge preferences of patient/family/discharge partner  - Complete POLST form as appropriate  - Assess patient's ability to be responsible for managing their own health  - Refer to Case Management Department for coordinating discharge planning if the patient needs post-hospital services based on physician/LIP order or complex needs related to functional status, cognitive ability or social support system  Outcome: Progressing     Problem: Altered Communication/Language Barrier  Goal: Patient/Family is able to understand and participate in their care  Description: Interventions:  - Assess communication ability and preferred communication style  - Implement communication aides and strategies  - Use visual cues when possible  - Listen attentively, be patient, do not interrupt  - Minimize distractions  - Allow time for understanding and response  - Establish method for patient to ask for assistance (call light)  - Provide an  as needed  - Communicate barriers and strategies to overcome with those who interact with patient  Outcome: Progressing     Problem: CARDIOVASCULAR - ADULT  Goal: Maintains optimal cardiac output and hemodynamic stability  Description: INTERVENTIONS:  - Monitor vital signs, rhythm, and trends  - Monitor for bleeding, hypotension and signs of decreased cardiac output  - Evaluate effectiveness of vasoactive medications to optimize hemodynamic stability  - Monitor arterial and/or venous puncture sites for bleeding and/or hematoma  - Assess quality of pulses, skin color and temperature  - Assess for signs of decreased coronary artery perfusion - ex.  Angina  - Evaluate fluid balance, assess for edema, trend weights  Outcome: Progressing  Goal: Absence of cardiac arrhythmias or at baseline  Description: INTERVENTIONS:  - Continuous cardiac monitoring, monitor vital signs, obtain 12 lead EKG if indicated  - Evaluate effectiveness of antiarrhythmic and heart rate control medications as ordered  - Initiate emergency measures for life threatening arrhythmias  - Monitor electrolytes and administer replacement therapy as ordered  Outcome: Progressing

## 2022-04-19 NOTE — TELEPHONE ENCOUNTER
Per pharmacy pt is requesting refill for the following medication. Drug: Benazepril 40MG tablets  Qty: 90  Sig: Take 1 tablet (40MG) by mouth daily.

## 2022-04-19 NOTE — PLAN OF CARE
Problem: Patient Centered Care  Goal: Patient preferences are identified and integrated in the patient's plan of care  Description: Interventions:  - What would you like us to know as we care for you?  Im from home   - Provide timely, complete, and accurate information to patient/family  - Incorporate patient and family knowledge, values, beliefs, and cultural backgrounds into the planning and delivery of care  - Encourage patient/family to participate in care and decision-making at the level they choose  - Honor patient and family perspectives and choices  Outcome: Progressing     Problem: Patient/Family Goals  Goal: Patient/Family Long Term Goal  Description: Patient's Long Term Goal:Get stronger     Interventions:  - See additional Care Plan goals for specific interventions  Outcome: Progressing  Goal: Patient/Family Short Term Goal  Description: Patient's Short Term Goal: Go home     Interventions:   - See additional Care Plan goals for specific interventions  Outcome: Progressing     Problem: PAIN - ADULT  Goal: Verbalizes/displays adequate comfort level or patient's stated pain goal  Description: INTERVENTIONS:  - Encourage pt to monitor pain and request assistance  - Assess pain using appropriate pain scale  - Administer analgesics based on type and severity of pain and evaluate response  - Implement non-pharmacological measures as appropriate and evaluate response  - Consider cultural and social influences on pain and pain management  - Manage/alleviate anxiety  - Utilize distraction and/or relaxation techniques  - Monitor for opioid side effects  - Notify MD/LIP if interventions unsuccessful or patient reports new pain  - Anticipate increased pain with activity and pre-medicate as appropriate  Outcome: Progressing     Problem: RISK FOR INFECTION - ADULT  Goal: Absence of fever/infection during anticipated neutropenic period  Description: INTERVENTIONS  - Monitor WBC  - Administer growth factors as ordered  - Implement neutropenic guidelines  Outcome: Progressing     Problem: SAFETY ADULT - FALL  Goal: Free from fall injury  Description: INTERVENTIONS:  - Assess pt frequently for physical needs  - Identify cognitive and physical deficits and behaviors that affect risk of falls.   - [place] fall precautions as indicated by assessment.  - Educate pt/family on patient safety including physical limitations  - Instruct pt to call for assistance with activity based on assessment  - Modify environment to reduce risk of injury  - Provide assistive devices as appropriate  - Consider OT/PT consult to assist with strengthening/mobility  - Encourage toileting schedule  Outcome: Progressing     Problem: DISCHARGE PLANNING  Goal: Discharge to home or other facility with appropriate resources  Description: INTERVENTIONS:  - Identify barriers to discharge w/pt and caregiver  - Include patient/family/discharge partner in discharge planning  - Arrange for needed discharge resources and transportation as appropriate  - Identify discharge learning needs (meds, wound care, etc)  - Arrange for interpreters to assist at discharge as needed  - Consider post-discharge preferences of patient/family/discharge partner  - Complete POLST form as appropriate  - Assess patient's ability to be responsible for managing their own health  - Refer to Case Management Department for coordinating discharge planning if the patient needs post-hospital services based on physician/LIP order or complex needs related to functional status, cognitive ability or social support system  Outcome: Progressing     Problem: Altered Communication/Language Barrier  Goal: Patient/Family is able to understand and participate in their care  Description: Interventions:  - Assess communication ability and preferred communication style  - Implement communication aides and strategies  - Use visual cues when possible  - Listen attentively, be patient, do not interrupt  - Minimize distractions  - Allow time for understanding and response  - Establish method for patient to ask for assistance (call light)  - Provide an  as needed  - Communicate barriers and strategies to overcome with those who interact with patient  Outcome: Progressing     Problem: CARDIOVASCULAR - ADULT  Goal: Maintains optimal cardiac output and hemodynamic stability  Description: INTERVENTIONS:  - Monitor vital signs, rhythm, and trends  - Monitor for bleeding, hypotension and signs of decreased cardiac output  - Evaluate effectiveness of vasoactive medications to optimize hemodynamic stability  - Monitor arterial and/or venous puncture sites for bleeding and/or hematoma  - Assess quality of pulses, skin color and temperature  - Assess for signs of decreased coronary artery perfusion - ex. Angina  - Evaluate fluid balance, assess for edema, trend weights  Outcome: Progressing  Goal: Absence of cardiac arrhythmias or at baseline  Description: INTERVENTIONS:  - Continuous cardiac monitoring, monitor vital signs, obtain 12 lead EKG if indicated  - Evaluate effectiveness of antiarrhythmic and heart rate control medications as ordered  - Initiate emergency measures for life threatening arrhythmias  - Monitor electrolytes and administer replacement therapy as ordered  Outcome: Progressing       Patient is alert and oriented, RA, VSS, no complaints of pain. Pt went for a cath, through right groin, no interventions. Iv lasix changed to PO torsemide.

## 2022-04-19 NOTE — PROCEDURES
[facility]    Cardiac Cath Procedure Note    [facility] Patient Status:  Inpatient    10/25/1932 MRN [id number]   Location [facility] 3W/SW Attending Asuncion Subramanian MD   Hosp Day # 1 PCP Leopoldo Fothergill, MD       Cardiologist: Mk Bradford MD  Primary Proceduralist: Mk Bradford MD  Procedure Performed: LHC, RHC and LV  Date of Procedure: 2022   Indication: NSTEMI, heart failure    Summary of procedure: Nonobstructive coronary disease, compensated filling pressures      Left Ventriculography and hemodynamics:   LV EF not done  LV EDP 10 mmHg  No gradient across aortic valve     RA 2  RV 33/1  PA 34/12 mean 19  PCW 13    CO 5.3/ CI 3.3  SVR 1315/       Coronary Angiography  RCA:  Dominant and free of obstructive disease, supplies PDA and PL    Left main:  Free of obstructive disease    Left anterior descending:  Free of obstructive disease, supplies diagonals which are non-obstructive    Circumflex: Codominant, free of obstructive disease, supplies OM branches which are patent      Assessment:  Valvular cardiomyopathy: Compensated, decompensated initially on presentation secondary to diet indiscretions. Filling pressures much improved after diuresis. Nonobstructive coronary disease    Hypertension      Recommendations:  Transition to oral diuretics  Reassess tomorrow  Will reassess mitral regurgitation with TTE in the office and then decide on whether or not she needs a mitral clip      Description of Procedure:   After written informed consent was obtained from the patient, patient was brought to the cardiac catheterization laboratory. Patient was prepped and draped in the usual sterile fashion. Lidocaine 1% was used to infiltrate the right groin for local anesthesia and a 6 [place] of [place] introducer sheath was inserted into the right femoral artery via ultrasound guidance and micropuncture kit. Right femoral vein accessed by palpation. 7 FR sheath placed.   Monitoring swan advanced to RA and pressures recorded in RA, RV, PA and wedge positions under fluoroscopic and hemodynamic guidance. Selective coronary angiography performed with JR4 catheter for RCA and JL4 catheter for LCA. Angiography performed in standard projections. 6 [place] JR4 catheter placed in LV for hemodynamics. Selective right femoral angiogram done assess anatomy for closure. Specimen sent to: No specimen collected  Estimated blood loss: 10 cc  Closure:  Perclose       IV was maintained by RN and moderate conscious sedation of versed and fentanyl was given. Patient was assessed and monitoring of oxygen, heart rate and blood pressure by nurse and myself during the exam 35 minutes.       Manpreet Troncoso MD  04/19/22

## 2022-04-20 VITALS
TEMPERATURE: 98 F | DIASTOLIC BLOOD PRESSURE: 74 MMHG | HEART RATE: 89 BPM | SYSTOLIC BLOOD PRESSURE: 127 MMHG | RESPIRATION RATE: 20 BRPM | WEIGHT: 126.81 LBS | BODY MASS INDEX: 23.34 KG/M2 | OXYGEN SATURATION: 98 % | HEIGHT: 62 IN

## 2022-04-20 LAB
ANION GAP SERPL CALC-SCNC: 5 MMOL/L (ref 0–18)
BASOPHILS # BLD AUTO: 0.02 X10(3) UL (ref 0–0.2)
BASOPHILS NFR BLD AUTO: 0.3 %
BUN BLD-MCNC: 23 MG/DL (ref 7–18)
BUN/CREAT SERPL: 27.1 (ref 10–20)
CALCIUM BLD-MCNC: 8.4 MG/DL (ref 8.5–10.1)
CHLORIDE SERPL-SCNC: 104 MMOL/L (ref 98–112)
CO2 SERPL-SCNC: 31 MMOL/L (ref 21–32)
CREAT BLD-MCNC: 0.85 MG/DL
DEPRECATED RDW RBC AUTO: 43.9 FL (ref 35.1–46.3)
EOSINOPHIL # BLD AUTO: 0.08 X10(3) UL (ref 0–0.7)
EOSINOPHIL NFR BLD AUTO: 1.1 %
ERYTHROCYTE [DISTWIDTH] IN BLOOD BY AUTOMATED COUNT: 12.4 % (ref 11–15)
GLUCOSE BLD-MCNC: 107 MG/DL (ref 70–99)
GLUCOSE BLDC GLUCOMTR-MCNC: 149 MG/DL (ref 70–99)
HCT VFR BLD AUTO: 28.6 %
HGB BLD-MCNC: 9.5 G/DL
IMM GRANULOCYTES # BLD AUTO: 0.02 X10(3) UL (ref 0–1)
IMM GRANULOCYTES NFR BLD: 0.3 %
LYMPHOCYTES # BLD AUTO: 1.18 X10(3) UL (ref 1–4)
LYMPHOCYTES NFR BLD AUTO: 16.6 %
MAGNESIUM SERPL-MCNC: 1.3 MG/DL (ref 1.6–2.6)
MCH RBC QN AUTO: 32.2 PG (ref 26–34)
MCHC RBC AUTO-ENTMCNC: 33.2 G/DL (ref 31–37)
MCV RBC AUTO: 96.9 FL
MONOCYTES # BLD AUTO: 0.99 X10(3) UL (ref 0.1–1)
MONOCYTES NFR BLD AUTO: 13.9 %
NEUTROPHILS # BLD AUTO: 4.83 X10 (3) UL (ref 1.5–7.7)
NEUTROPHILS # BLD AUTO: 4.83 X10(3) UL (ref 1.5–7.7)
NEUTROPHILS NFR BLD AUTO: 67.8 %
OSMOLALITY SERPL CALC.SUM OF ELEC: 294 MOSM/KG (ref 275–295)
PLATELET # BLD AUTO: 217 10(3)UL (ref 150–450)
POTASSIUM SERPL-SCNC: 4.6 MMOL/L (ref 3.5–5.1)
POTASSIUM SERPL-SCNC: 4.6 MMOL/L (ref 3.5–5.1)
RBC # BLD AUTO: 2.95 X10(6)UL
SODIUM SERPL-SCNC: 140 MMOL/L (ref 136–145)
WBC # BLD AUTO: 7.1 X10(3) UL (ref 4–11)

## 2022-04-20 PROCEDURE — 99239 HOSP IP/OBS DSCHRG MGMT >30: CPT | Performed by: HOSPITALIST

## 2022-04-20 RX ORDER — MAGNESIUM OXIDE 400 MG (241.3 MG MAGNESIUM) TABLET
800 TABLET ONCE
Status: COMPLETED | OUTPATIENT
Start: 2022-04-20 | End: 2022-04-20

## 2022-04-20 NOTE — CARDIAC REHAB
Cardiac Rehab Phase I    Activity:  Distance   Assistance needed   Patient tolerated activity . Education:  Handouts provided and reviewed: [address] Diet: Healthy Cardiac diet reviewed. Disease Process: Disease process reviewed. Reviewed the following:       RISK FACTORS: Reviewed      SMOKING CESSATION: Reviewed      HOME EXERCISE ACTIVITY: Reviewed      OUTPATIENT CARDIAC REHAB: Referred to Cardiac Rehabilitation Phase 2.

## 2022-04-20 NOTE — PLAN OF CARE
Problem: Patient Centered Care  Goal: Patient preferences are identified and integrated in the patient's plan of care  Description: Interventions:  - What would you like us to know as we care for you?  Im from home   - Provide timely, complete, and accurate information to patient/family  - Incorporate patient and family knowledge, values, beliefs, and cultural backgrounds into the planning and delivery of care  - Encourage patient/family to participate in care and decision-making at the level they choose  - Honor patient and family perspectives and choices  Outcome: Progressing     Problem: Patient/Family Goals  Goal: Patient/Family Long Term Goal  Description: Patient's Long Term Goal:Get stronger     Interventions:  - See additional Care Plan goals for specific interventions  Outcome: Progressing  Goal: Patient/Family Short Term Goal  Description: Patient's Short Term Goal: Go home     Interventions:   - See additional Care Plan goals for specific interventions  Outcome: Progressing     Problem: PAIN - ADULT  Goal: Verbalizes/displays adequate comfort level or patient's stated pain goal  Description: INTERVENTIONS:  - Encourage pt to monitor pain and request assistance  - Assess pain using appropriate pain scale  - Administer analgesics based on type and severity of pain and evaluate response  - Implement non-pharmacological measures as appropriate and evaluate response  - Consider cultural and social influences on pain and pain management  - Manage/alleviate anxiety  - Utilize distraction and/or relaxation techniques  - Monitor for opioid side effects  - Notify MD/LIP if interventions unsuccessful or patient reports new pain  - Anticipate increased pain with activity and pre-medicate as appropriate  Outcome: Progressing     Problem: RISK FOR INFECTION - ADULT  Goal: Absence of fever/infection during anticipated neutropenic period  Description: INTERVENTIONS  - Monitor WBC  - Administer growth factors as ordered  - Implement neutropenic guidelines  Outcome: Progressing     Problem: SAFETY ADULT - FALL  Goal: Free from fall injury  Description: INTERVENTIONS:  - Assess pt frequently for physical needs  - Identify cognitive and physical deficits and behaviors that affect risk of falls.   - [place] fall precautions as indicated by assessment.  - Educate pt/family on patient safety including physical limitations  - Instruct pt to call for assistance with activity based on assessment  - Modify environment to reduce risk of injury  - Provide assistive devices as appropriate  - Consider OT/PT consult to assist with strengthening/mobility  - Encourage toileting schedule  Outcome: Progressing     Problem: DISCHARGE PLANNING  Goal: Discharge to home or other facility with appropriate resources  Description: INTERVENTIONS:  - Identify barriers to discharge w/pt and caregiver  - Include patient/family/discharge partner in discharge planning  - Arrange for needed discharge resources and transportation as appropriate  - Identify discharge learning needs (meds, wound care, etc)  - Arrange for interpreters to assist at discharge as needed  - Consider post-discharge preferences of patient/family/discharge partner  - Complete POLST form as appropriate  - Assess patient's ability to be responsible for managing their own health  - Refer to Case Management Department for coordinating discharge planning if the patient needs post-hospital services based on physician/LIP order or complex needs related to functional status, cognitive ability or social support system  Outcome: Progressing     Problem: Altered Communication/Language Barrier  Goal: Patient/Family is able to understand and participate in their care  Description: Interventions:  - Assess communication ability and preferred communication style  - Implement communication aides and strategies  - Use visual cues when possible  - Listen attentively, be patient, do not interrupt  - Minimize distractions  - Allow time for understanding and response  - Establish method for patient to ask for assistance (call light)  - Provide an  as needed  - Communicate barriers and strategies to overcome with those who interact with patient  Outcome: Progressing     Problem: CARDIOVASCULAR - ADULT  Goal: Maintains optimal cardiac output and hemodynamic stability  Description: INTERVENTIONS:  - Monitor vital signs, rhythm, and trends  - Monitor for bleeding, hypotension and signs of decreased cardiac output  - Evaluate effectiveness of vasoactive medications to optimize hemodynamic stability  - Monitor arterial and/or venous puncture sites for bleeding and/or hematoma  - Assess quality of pulses, skin color and temperature  - Assess for signs of decreased coronary artery perfusion - ex.  Angina  - Evaluate fluid balance, assess for edema, trend weights  Outcome: Progressing  Goal: Absence of cardiac arrhythmias or at baseline  Description: INTERVENTIONS:  - Continuous cardiac monitoring, monitor vital signs, obtain 12 lead EKG if indicated  - Evaluate effectiveness of antiarrhythmic and heart rate control medications as ordered  - Initiate emergency measures for life threatening arrhythmias  - Monitor electrolytes and administer replacement therapy as ordered  Outcome: Progressing

## 2022-04-20 NOTE — DISCHARGE SUMMARY
[facility] Hospitalist Discharge Summary   Patient ID:  Mariann Best  [id number]  80year old  10/25/1932    Admit date: 4/18/2022  Discharge date: 4/20/2022  Primary Care Physician: Jana Harper MD   Attending Physician: Low Wolf MD   Consults:   Consultants  Chat With All Active Members    Provider Role Specialty    ALICIA Franklin  Consulting Physician  Nurse Practitioner     Debra Aviles., ALICIA  Consulting Physician  Nurse Practitioner          Discharge Diagnoses:   Acute HFrEF   Hypoxia from CHF       Reason for admission  As per H&P: Per Dr. Al Katz    The patient is an 28-year-old  female with a known underlying chronic diastolic heart failure who came in today to the emergency room for evaluation of progressive wheezing, dyspnea on exertion, and orthopnea. CBC was unremarkable. Chemistry showed GFR of 58, which is below her baseline. Troponin 162. EKG showed sinus tachycardia and nonspecific ST-T changes. ProBNP 3000. Chest x-ray showed cardiomegaly with heart failure changes. Was started on IV Lasix in the emergency room. She will be admitted to the hospital for further management. Hospital Course:    Acute on chronic HFrEF  - cards following   - newly reduced EF with severe MR. In party her CHF flare is from dietary indiscretion   - s/p RhC and LHC 4/19 nonobstructive CAD compensated filling pressure  - was diuresed with IV diuretics now switched to PO on discharge  - breathing much improved. No SOB or SALAS. - outpt fu with Dr. Pradeep Huffman for TY and possible Mitral valve clip   - cont coreg, lisinopril, torsemide  ECHO reviewed no WMA, EF reduced to 30-35%, Grade 2DD. Prev EF 40-45%. - dietitian saw pt  - fu with CHF clinic next week. Acute hypoxic respiratory failure  - on RA now  - secondary to above     NSTEMI type II  - secondary to CHF  - troponin chronically elevated. - ECHO as above.      HTN   - cont home meds     DM II   - stable cont home meds.   - accu checks. EXAM:   GENERAL: no apparent distress, comfortable  NEURO: A/A Ox3, no focal deficits  RESP: non labored, CTAB/L  CARDIO: Regular, no murmur  ABD: soft, NT, ND  EXTREMITIES: no edema, no calf tenderness    Operative Procedures:     Discharge Instructions     Medication List      CHANGE how you take these medications    Benazepril HCl 40 MG Tabs  Commonly known as: LOTENSIN  TAKE 1 TABLET(40 MG) BY MOUTH DAILY  What changed: when to take this     traMADol 50 MG Tabs  Commonly known as: Ultram  Take 1 tablet (50 mg total) by mouth every 8 (eight) hours as needed for Pain. What changed: Another medication with the same name was removed. Continue taking this medication, and follow the directions you see here. CONTINUE taking these medications    amLODIPine 10 MG Tabs  Commonly known as: Norvasc  Take 1 tablet (10 mg total) by mouth daily. carvedilol 12.5 MG Tabs  Commonly known as: Coreg     loperamide 2 MG Caps  Commonly known as: Imodium     Multi Vitamin/Minerals Tabs     Omeprazole 40 MG Cpdr  TAKE 1 CAPSULE(40 MG) BY MOUTH DAILY     torsemide 10 MG Tabs  Commonly known as: DEMADEX  Take 1 tablet (10 mg total) by mouth daily. TURMERIC OR            Activity: activity as tolerated  Diet: cardiac diet  Wound Care: NA  Code Status: Full Code        Discharge Instructions       [facility] Dr. Beth Torres on 5/2  FU with PCP in 2 weeks  Take medications as prescribed. Follow heart failure clinic 1 week and Dr. Beth Torres as above. Keep a log at home for daily blood pressure and weight. Check BP and weight in morning, after using bathroom before breakfast. Bring the log with you everytime your see your doctor. When weight gain of 3 pounds in a day or 5 pounds in a week, call Dr. Miguel Ozuna office or CHF clinic  Gaining weight is likely caused by exacerbation of heart failure. Important follow up: Follow-up Information     Stacie Parry MD On 5/2/2022.     Specialties: Interventional, Cardiology, CARDIOLOGY  Why: @ 8:45 AM  Contact information:  [address] [facility]             Prosper Mcfarland MD In 2 weeks. Specialty: Internal Medicine  Contact information:  [place]   [phone]                         -PCP in [] within 7 days [] within 14 days [] other     Disposition: home  Discharged Condition: good    Hospital Discharge Diagnoses: chf    Lace+ Score: 63  59-90 High Risk  29-58 Medium Risk  0-28   Low Risk. TCM Follow-Up Recommendation:  LACE > 58:  High Risk of readmission after discharge from the hospital.            Total Time Coordinating Care: Greater than 30 minutes    Patient had opportunity to ask questions, state understanding, and agree with therapeutic plan as outlined    Priya Stewart MD  Hospitalist  4/20/2022

## 2022-04-20 NOTE — DISCHARGE PLANNING
Patient was provided with discharge instructions, education, and follow up information.  Patient verbalizes understanding of follow up information, specifically next medication doses, follow up appointments and review of discharge instructions for both heart failure and post angiogram. Patient has no questions after reviewing all instructions and will be going home with her daughter around [address], Discharge Leader [id number]

## 2022-04-21 ENCOUNTER — PATIENT OUTREACH (OUTPATIENT)
Dept: CASE MANAGEMENT | Age: 87
End: 2022-04-21

## 2022-04-21 RX ORDER — BENAZEPRIL HYDROCHLORIDE 40 MG/1
TABLET, FILM COATED ORAL
Qty: 90 TABLET | Refills: 0 | Status: SHIPPED | OUTPATIENT
Start: 2022-04-21 | End: 2022-08-05

## 2022-05-03 NOTE — TELEPHONE ENCOUNTER
Dr. Rosalind Rolle pt called and she will like a refill for her tramadol she is completely out of this medication and she needs it as she is have arthritis  Pain.

## 2022-05-04 PROBLEM — I50.22 CHRONIC SYSTOLIC HEART FAILURE (HCC): Status: RESOLVED | Noted: 2020-06-01 | Resolved: 2022-05-04

## 2022-05-04 PROBLEM — E78.5 HYPERLIPIDEMIA: Status: ACTIVE | Noted: 2022-05-04

## 2022-05-04 PROBLEM — J98.01 BRONCHOSPASM: Status: RESOLVED | Noted: 2022-04-18 | Resolved: 2022-05-04

## 2022-05-04 PROBLEM — D50.8 IRON DEFICIENCY ANEMIA SECONDARY TO INADEQUATE DIETARY IRON INTAKE: Status: ACTIVE | Noted: 2020-06-01

## 2022-05-04 RX ORDER — TRAMADOL HYDROCHLORIDE 50 MG/1
50 TABLET ORAL EVERY 8 HOURS PRN
Qty: 90 TABLET | Refills: 0 | OUTPATIENT
Start: 2022-05-04

## 2022-05-04 RX ORDER — TRAMADOL HYDROCHLORIDE 50 MG/1
TABLET ORAL
Qty: 90 TABLET | Refills: 0 | Status: SHIPPED | OUTPATIENT
Start: 2022-05-04

## 2022-05-04 NOTE — TELEPHONE ENCOUNTER
Please review. Protocol failed/ No protocol      Requested Prescriptions   Pending Prescriptions Disp Refills    TRAMADOL 50 MG Oral Tab [Pharmacy Med Name: TRAMADOL 50MG TABLETS] 90 tablet 0     Sig: TAKE 1 TABLET(50 MG) BY MOUTH EVERY 8 HOURS AS NEEDED FOR PAIN        There is no refill protocol information for this order           Future Appointments         Provider Department Appt Notes    In 1 week Danelle He MD [facility], [facility], [address] [facility] f/u \"policy informed\"             Recent Outpatient Visits              6 months ago Encounter for vaccination    [facility], [facility] Lori Vidalia    Nurse Only    9 months ago Essential hypertension    [facility], [address] MD    Office Visit    10 months ago B12 deficiency    [facility], [address] [facility] [place]leen [facility] [place]    Nurse Only    12 months ago Primary osteoarthritis involving multiple joints    Cathi Fleischer, MD    Telemedicine    1 year ago Encounter for vaccination    [facility] [address], [facility]    Nurse Only

## 2022-05-06 NOTE — PROGRESS NOTES
Multiple attempts to reach pt and messages left with no return call. Past TCM timeframe. Encounter closing. (3) no apparent problem

## 2022-05-17 ENCOUNTER — OFFICE VISIT (OUTPATIENT)
Dept: INTERNAL MEDICINE CLINIC | Facility: CLINIC | Age: 87
End: 2022-05-17
Payer: MEDICARE

## 2022-05-17 VITALS
WEIGHT: 122 LBS | HEART RATE: 60 BPM | SYSTOLIC BLOOD PRESSURE: 130 MMHG | BODY MASS INDEX: 22.45 KG/M2 | HEIGHT: 62 IN | DIASTOLIC BLOOD PRESSURE: 63 MMHG

## 2022-05-17 DIAGNOSIS — E11.9 CONTROLLED TYPE 2 DIABETES MELLITUS WITHOUT COMPLICATION, WITHOUT LONG-TERM CURRENT USE OF INSULIN (HCC): ICD-10-CM

## 2022-05-17 DIAGNOSIS — M79.673 PAIN OF FOOT, UNSPECIFIED LATERALITY: ICD-10-CM

## 2022-05-17 DIAGNOSIS — I50.9 CHRONIC CONGESTIVE HEART FAILURE, UNSPECIFIED HEART FAILURE TYPE (HCC): ICD-10-CM

## 2022-05-17 DIAGNOSIS — L60.2 LONG TOENAIL: ICD-10-CM

## 2022-05-17 DIAGNOSIS — I34.0 NONRHEUMATIC MITRAL VALVE REGURGITATION: ICD-10-CM

## 2022-05-17 DIAGNOSIS — E78.5 HYPERLIPIDEMIA, UNSPECIFIED HYPERLIPIDEMIA TYPE: ICD-10-CM

## 2022-05-17 DIAGNOSIS — I10 ESSENTIAL HYPERTENSION: Primary | ICD-10-CM

## 2022-05-17 DIAGNOSIS — E53.8 VITAMIN B 12 DEFICIENCY: ICD-10-CM

## 2022-05-17 PROCEDURE — 3075F SYST BP GE 130 - 139MM HG: CPT | Performed by: INTERNAL MEDICINE

## 2022-05-17 PROCEDURE — 90677 PCV20 VACCINE IM: CPT | Performed by: INTERNAL MEDICINE

## 2022-05-17 PROCEDURE — 3078F DIAST BP <80 MM HG: CPT | Performed by: INTERNAL MEDICINE

## 2022-05-17 PROCEDURE — 1111F DSCHRG MED/CURRENT MED MERGE: CPT | Performed by: INTERNAL MEDICINE

## 2022-05-17 PROCEDURE — G0009 ADMIN PNEUMOCOCCAL VACCINE: HCPCS | Performed by: INTERNAL MEDICINE

## 2022-05-17 PROCEDURE — 99214 OFFICE O/P EST MOD 30 MIN: CPT | Performed by: INTERNAL MEDICINE

## 2022-05-17 PROCEDURE — 3008F BODY MASS INDEX DOCD: CPT | Performed by: INTERNAL MEDICINE

## 2022-05-23 RX ORDER — AMLODIPINE BESYLATE 10 MG/1
10 TABLET ORAL DAILY
Qty: 90 TABLET | Refills: 1 | Status: SHIPPED | OUTPATIENT
Start: 2022-05-23

## 2022-05-23 NOTE — TELEPHONE ENCOUNTER
Refill passed per [facility] protocol.   Requested Prescriptions   Pending Prescriptions Disp Refills    AMLODIPINE 10 MG Oral Tab [Pharmacy Med Name: AMLODIPINE BESYLATE 10MG TABLETS] 90 tablet 0     Sig: TAKE 1 TABLET(10 MG) BY MOUTH DAILY        Hypertensive Medications Protocol Passed - 5/23/2022  5:53 PM        Passed - CMP or BMP in past 12 months        Passed - Appointment in past 6 or next 3 months        Passed - GFR Non- > 50     Lab Results   Component Value Date    GFRNAA 61 04/20/2022                      Recent Outpatient Visits              6 days ago Essential hypertension    Juan Miguel Lancaster MD    Office Visit    2 weeks ago Chronic systolic heart failure [facility])    Brennon Lynch MD    Office Visit    7 months ago Encounter for vaccination    [facility], [address]    Nurse Only    10 months ago Essential hypertension    Juan Miguel Lancaster MD    Office Visit    11 months ago B12 deficiency    [facility], [address]    Nurse Only

## 2022-06-05 ENCOUNTER — TELEPHONE (OUTPATIENT)
Dept: INTERNAL MEDICINE CLINIC | Facility: CLINIC | Age: 87
End: 2022-06-05

## 2022-06-06 NOTE — TELEPHONE ENCOUNTER
Please review Protocol Failed/No Protocol        Requested Prescriptions   Pending Prescriptions Disp Refills    TRAMADOL 50 MG Oral Tab [Pharmacy Med Name: TRAMADOL 50MG TABLETS] 90 tablet 0     Sig: TAKE 1 TABLET(50 MG) BY MOUTH EVERY 8 HOURS AS NEEDED FOR PAIN        There is no refill protocol information for this order               Recent Outpatient Visits              2 weeks ago Essential hypertension    Jose Miguel Roy MD    Office Visit    1 month ago Chronic systolic heart failure [facility])    Alanis Hilliard MD    Office Visit    8 months ago Encounter for vaccination    [facility] Aguilar    Nurse Only    10 months ago Essential hypertension    Jose Miguel Roy MD    Office Visit    11 months ago B12 deficiency    [facility], [facility], [address]    Nurse Only

## 2022-06-09 RX ORDER — OMEPRAZOLE 40 MG/1
40 CAPSULE, DELAYED RELEASE ORAL DAILY
Qty: 90 CAPSULE | Refills: 1 | Status: SHIPPED | OUTPATIENT
Start: 2022-06-09

## 2022-06-09 NOTE — TELEPHONE ENCOUNTER
Dr Shavonne Moses:    Patient requesting tramadol. Uses for arthritis, especially her knee pain. She has been using tylenol and would rather not take too much tylenol. Tramadol helps her get around.      Last rx given 5/4/22

## 2022-06-09 NOTE — TELEPHONE ENCOUNTER
Refill passed per [employer] protocol.     Requested Prescriptions   Pending Prescriptions Disp Refills    OMEPRAZOLE 40 MG Oral Capsule Delayed Release [Pharmacy Med Name: OMEPRAZOLE 40MG CAPSULES] 90 capsule 1     Sig: TAKE 1 CAPSULE(40 MG) BY MOUTH DAILY        Gastrointestional Medication Protocol Passed - 6/9/2022  5:44 AM        Passed - Appointment in past 12 or next 3 months            Recent Outpatient Visits              3 weeks ago Essential hypertension    Peter Daniel MD    Office Visit    1 month ago Chronic systolic heart failure [facility])    Shamika Clark MD    Office Visit    8 months ago Encounter for vaccination    Gray Aguilar    Nurse Only    10 months ago Essential hypertension    Peter Daniel MD    Office Visit    11 months ago B12 deficiency    [place] [facility], [facility], [address]    Nurse Only

## 2022-06-10 RX ORDER — TRAMADOL HYDROCHLORIDE 50 MG/1
50 TABLET ORAL EVERY 8 HOURS PRN
Qty: 90 TABLET | Refills: 0 | Status: SHIPPED | OUTPATIENT
Start: 2022-06-10

## 2022-07-07 NOTE — TELEPHONE ENCOUNTER
Spoke with patient ( verified)--following up on Tramadol refill request--has only 3 tablets left. Pharmacy told her they can fill by tomorrow--please advise.

## 2022-07-08 RX ORDER — TRAMADOL HYDROCHLORIDE 50 MG/1
TABLET ORAL
Qty: 90 TABLET | Refills: 0 | Status: SHIPPED | OUTPATIENT
Start: 2022-07-08

## 2022-08-05 RX ORDER — BENAZEPRIL HYDROCHLORIDE 40 MG/1
TABLET, FILM COATED ORAL
Qty: 90 TABLET | Refills: 0 | Status: SHIPPED | OUTPATIENT
Start: 2022-08-05

## 2022-08-11 RX ORDER — TRAMADOL HYDROCHLORIDE 50 MG/1
TABLET ORAL
Qty: 90 TABLET | Refills: 0 | Status: SHIPPED | OUTPATIENT
Start: 2022-08-11

## 2022-09-15 RX ORDER — TRAMADOL HYDROCHLORIDE 50 MG/1
TABLET ORAL
Qty: 90 TABLET | Refills: 0 | Status: SHIPPED | OUTPATIENT
Start: 2022-09-15

## 2022-09-15 NOTE — TELEPHONE ENCOUNTER
Please review. Protocol failed / No protocol.    Requested Prescriptions   Pending Prescriptions Disp Refills    TRAMADOL 50 MG Oral Tab [Pharmacy Med Name: TRAMADOL 50MG TABLETS] 90 tablet 0     Sig: TAKE 1 TABLET(50 MG) BY MOUTH EVERY 8 HOURS AS NEEDED FOR PAIN        There is no refill protocol information for this order          Recent Outpatient Visits              4 months ago Essential hypertension    Mike Meza MD    Office Visit    11 months ago Encounter for vaccination    [place], [address]    Nurse Only    1 year ago Essential hypertension    Mike Meza MD    Office Visit    1 year ago B12 deficiency    [facility], [facility], [address] [facility] Darshan Macias    Nurse Only    1 year ago Primary osteoarthritis involving multiple joints    Mike Meza MD    Telemedicine           Future Appointments         Provider Department Appt Notes    In 5 days [facility], [address], [place] Pain of foot, Long toenail

## 2022-09-20 ENCOUNTER — OFFICE VISIT (OUTPATIENT)
Dept: PODIATRY CLINIC | Facility: CLINIC | Age: 87
End: 2022-09-20

## 2022-09-20 DIAGNOSIS — B35.1 ONYCHOMYCOSIS: ICD-10-CM

## 2022-09-20 DIAGNOSIS — M79.675 PAIN IN TOES OF BOTH FEET: Primary | ICD-10-CM

## 2022-09-20 DIAGNOSIS — M79.674 PAIN IN TOES OF BOTH FEET: Primary | ICD-10-CM

## 2022-09-20 PROCEDURE — 11721 DEBRIDE NAIL 6 OR MORE: CPT | Performed by: PODIATRIST

## 2022-09-20 PROCEDURE — 99203 OFFICE O/P NEW LOW 30 MIN: CPT | Performed by: PODIATRIST

## 2022-10-25 NOTE — TELEPHONE ENCOUNTER
Please review. Protocol failed/ No protocol.     Requested Prescriptions   Pending Prescriptions Disp Refills    TRAMADOL 50 MG Oral Tab [Pharmacy Med Name: TRAMADOL 50MG TABLETS] 90 tablet 0     Sig: TAKE 1 TABLET(50 MG) BY MOUTH EVERY 8 HOURS AS NEEDED FOR PAIN        There is no refill protocol information for this order           Recent Outpatient Visits              1 month ago Pain in toes of both [address] [facility] [place], [address] [facility], [facility] [place], [place]    Office Visit    5 months ago Essential hypertension    Elsa Martin MD    Office Visit    1 year ago Encounter for vaccination    Dede Aguilar    Nurse Only    1 year ago Essential hypertension    Elsa Martin MD    Office Visit    1 year ago B12 deficiency    [facility], [facility], [address]    Nurse Only

## 2022-10-27 RX ORDER — TRAMADOL HYDROCHLORIDE 50 MG/1
50 TABLET ORAL EVERY 8 HOURS PRN
Qty: 90 TABLET | Refills: 0 | Status: SHIPPED | OUTPATIENT
Start: 2022-10-27

## 2022-11-04 RX ORDER — BENAZEPRIL HYDROCHLORIDE 40 MG/1
TABLET, FILM COATED ORAL
Qty: 90 TABLET | Refills: 0 | Status: SHIPPED | OUTPATIENT
Start: 2022-11-04

## 2022-11-04 NOTE — TELEPHONE ENCOUNTER
Please review. Protocol failed / No Protocol. Requested Prescriptions   Pending Prescriptions Disp Refills    BENAZEPRIL HCL 40 MG Oral Tab [Pharmacy Med Name: BENAZEPRIL 40MG TABLETS] 90 tablet 0     Sig: TAKE 1 TABLET(40 MG) BY MOUTH DAILY       Hypertensive Medications Protocol Failed - 11/3/2022  5:42 AM        Failed - CMP or BMP in past 6 months     No results found for this or any previous visit (from the past 4392 hour(s)).             Passed - In person appointment in the past 12 or next 3 months     Recent Outpatient Visits              1 month ago Pain in toes of both [address] [facility], [address], [address] [place] [address], [facility]    Office Visit    5 months ago Essential hypertension    Juan Miguel Lancaster MD    Office Visit    1 year ago Encounter for vaccination    Jonathan Aguilar    Nurse Only    1 year ago Essential hypertension    Juan Miguel Lancaster MD    Office Visit    1 year ago B12 deficiency    [address], [address] [facility] [address]    Nurse Only                      Passed - Last BP reading less than 140/90     BP Readings from Last 1 Encounters:  05/17/22 : 130/63              Passed - In person appointment or virtual visit in the past 6 months     Recent Outpatient Visits              1 month ago Pain in toes of both feet    [facility], [address], [address] [facility], [facility] [place]    Office Visit    5 months ago Essential hypertension    Juan Miguel Lancaster MD    Office Visit    1 year ago Encounter for vaccination    Dede Aguilar    Nurse Only    1 year ago Essential hypertension    Juan Miguel Lancaster MD    Office Visit    1 year ago B12 deficiency    [address], [address] [facility] [address]    Nurse Only Passed - EGFRCR or GFRNAA > 50     GFR Evaluation  GFRNAA: 61 , resulted on 4/20/2022

## 2022-12-02 NOTE — TELEPHONE ENCOUNTER
Pt wants Dr. Griselda Medel to approve her Tramadol. She is out of medication and cannot get around well because she is in a lot of pain.     Send to [email or web address] in [facility] (on file)    Call pt with questions: [phone]

## 2022-12-03 RX ORDER — AMLODIPINE BESYLATE 10 MG/1
10 TABLET ORAL DAILY
Qty: 90 TABLET | Refills: 1 | Status: SHIPPED | OUTPATIENT
Start: 2022-12-03

## 2022-12-03 RX ORDER — TRAMADOL HYDROCHLORIDE 50 MG/1
TABLET ORAL
Qty: 90 TABLET | Refills: 0 | Status: SHIPPED | OUTPATIENT
Start: 2022-12-03

## 2023-01-03 RX ORDER — TRAMADOL HYDROCHLORIDE 50 MG/1
50 TABLET ORAL EVERY 8 HOURS PRN
Qty: 30 TABLET | Refills: 0 | Status: SHIPPED | OUTPATIENT
Start: 2023-01-03

## 2023-01-03 NOTE — TELEPHONE ENCOUNTER
Patient called and requested tramadol refill, took the last pill today,pharmacy verified. Pended prescription, routed to RN triage to run for protocol , thanks.

## 2023-01-03 NOTE — TELEPHONE ENCOUNTER
Zahira Puente for Dr. Chaparro Khan pt is calling to know the status of her tramadol refill request. Pt will like to have it refilled today as she took her last pill today.  Please advise

## 2023-01-04 NOTE — TELEPHONE ENCOUNTER
Left pt a detailed message that her medication has been sent to the pharmacy can call us back if she has questions or concerns.

## 2023-02-01 RX ORDER — OMEPRAZOLE 40 MG/1
40 CAPSULE, DELAYED RELEASE ORAL DAILY
Qty: 90 CAPSULE | Refills: 1 | Status: SHIPPED | OUTPATIENT
Start: 2023-02-01

## 2023-02-02 NOTE — TELEPHONE ENCOUNTER
Refill passed per [facility] protocol   Requested Prescriptions   Pending Prescriptions Disp Refills    Omeprazole 40 MG Oral Capsule Delayed Release 90 capsule 1     Sig: Take 1 capsule (40 mg total) by mouth daily.        Gastrointestional Medication Protocol Passed - 2/1/2023  2:18 PM        Passed - In person appointment or virtual visit in the past 12 mos or appointment in next 3 mos     Recent Outpatient Visits              4 months ago Pain in toes of both feet    [address] [place] [address] PATT Orellana    Office Visit    8 months ago Essential hypertension    Mary Jane Yoon MD    Office Visit    1 year ago Encounter for vaccination    Dede Aguilar    Nurse Only    1 year ago Essential hypertension    Mary Jane Yoon MD    Office Visit    1 year ago B12 deficiency    [address], [address] Only

## 2023-02-03 RX ORDER — TRAMADOL HYDROCHLORIDE 50 MG/1
50 TABLET ORAL EVERY 8 HOURS PRN
Qty: 30 TABLET | Refills: 5 | Status: SHIPPED | OUTPATIENT
Start: 2023-02-03

## 2023-02-03 NOTE — TELEPHONE ENCOUNTER
Please review refill failed/no protocol     Requested Prescriptions     Pending Prescriptions Disp Refills    TRAMADOL 50 MG Oral Tab [Pharmacy Med Name: TRAMADOL 50MG TABLETS] 30 tablet 0     Sig: TAKE 1 TABLET(50 MG) BY MOUTH EVERY 8 HOURS AS NEEDED FOR PAIN         Recent Visits  Date Type Provider Dept   05/17/22 Office Visit Prosper Mcfarland MD [facility]   Showing recent visits within past 540 days with a meds authorizing provider and meeting all other requirements  Future Appointments  No visits were found meeting these conditions.   Showing future appointments within next 150 days with a meds authorizing provider and meeting all other requirements    Requested Prescriptions   Pending Prescriptions Disp Refills    TRAMADOL 50 MG Oral Tab [Pharmacy Med Name: TRAMADOL 50MG TABLETS] 30 tablet 0     Sig: TAKE 1 TABLET(50 MG) BY MOUTH EVERY 8 HOURS AS NEEDED FOR PAIN       There is no refill protocol information for this order

## 2023-02-24 ENCOUNTER — APPOINTMENT (OUTPATIENT)
Dept: CV DIAGNOSTICS | Facility: HOSPITAL | Age: 88
End: 2023-02-24
Payer: MEDICARE

## 2023-02-24 ENCOUNTER — APPOINTMENT (OUTPATIENT)
Dept: GENERAL RADIOLOGY | Facility: HOSPITAL | Age: 88
End: 2023-02-24
Payer: MEDICARE

## 2023-02-24 ENCOUNTER — HOSPITAL ENCOUNTER (INPATIENT)
Facility: HOSPITAL | Age: 88
LOS: 1 days | Discharge: HOME HEALTH | End: 2023-02-25
Attending: EMERGENCY MEDICINE | Admitting: HOSPITALIST
Payer: MEDICARE

## 2023-02-24 ENCOUNTER — APPOINTMENT (OUTPATIENT)
Dept: CT IMAGING | Facility: HOSPITAL | Age: 88
End: 2023-02-24
Attending: EMERGENCY MEDICINE
Payer: MEDICARE

## 2023-02-24 ENCOUNTER — HOSPITAL ENCOUNTER (OUTPATIENT)
Facility: HOSPITAL | Age: 88
Setting detail: OBSERVATION
Discharge: HOME HEALTH | End: 2023-02-25
Attending: EMERGENCY MEDICINE | Admitting: HOSPITALIST
Payer: MEDICARE

## 2023-02-24 DIAGNOSIS — I24.9 ACUTE CORONARY SYNDROME (HCC): ICD-10-CM

## 2023-02-24 DIAGNOSIS — I50.43 ACUTE ON CHRONIC COMBINED SYSTOLIC AND DIASTOLIC CONGESTIVE HEART FAILURE (HCC): ICD-10-CM

## 2023-02-24 DIAGNOSIS — J96.01 ACUTE RESPIRATORY FAILURE WITH HYPOXIA (HCC): Primary | ICD-10-CM

## 2023-02-24 LAB
ANION GAP SERPL CALC-SCNC: 6 MMOL/L (ref 0–18)
APTT PPP: 29.1 SECONDS (ref 23.3–35.6)
ATRIAL RATE: 110 BPM
BASOPHILS # BLD AUTO: 0.06 X10(3) UL (ref 0–0.2)
BASOPHILS NFR BLD AUTO: 0.5 %
BUN BLD-MCNC: 25 MG/DL (ref 7–18)
BUN/CREAT SERPL: 21.9 (ref 10–20)
CALCIUM BLD-MCNC: 8.9 MG/DL (ref 8.5–10.1)
CHLORIDE SERPL-SCNC: 111 MMOL/L (ref 98–112)
CHOLEST SERPL-MCNC: 148 MG/DL (ref ?–200)
CO2 SERPL-SCNC: 24 MMOL/L (ref 21–32)
CREAT BLD-MCNC: 1.14 MG/DL
D DIMER PPP FEU-MCNC: 2.97 UG/ML FEU (ref ?–0.9)
DEPRECATED RDW RBC AUTO: 52 FL (ref 35.1–46.3)
EOSINOPHIL # BLD AUTO: 0.2 X10(3) UL (ref 0–0.7)
EOSINOPHIL NFR BLD AUTO: 1.6 %
ERYTHROCYTE [DISTWIDTH] IN BLOOD BY AUTOMATED COUNT: 14.6 % (ref 11–15)
EST. AVERAGE GLUCOSE BLD GHB EST-MCNC: 134 MG/DL (ref 68–126)
FLUAV + FLUBV RNA SPEC NAA+PROBE: NEGATIVE
FLUAV + FLUBV RNA SPEC NAA+PROBE: NEGATIVE
GFR SERPLBLD BASED ON 1.73 SQ M-ARVRAT: 46 ML/MIN/1.73M2 (ref 60–?)
GLUCOSE BLD-MCNC: 215 MG/DL (ref 70–99)
GLUCOSE BLDC GLUCOMTR-MCNC: 141 MG/DL (ref 70–99)
GLUCOSE BLDC GLUCOMTR-MCNC: 155 MG/DL (ref 70–99)
GLUCOSE BLDC GLUCOMTR-MCNC: 172 MG/DL (ref 70–99)
GLUCOSE BLDC GLUCOMTR-MCNC: 172 MG/DL (ref 70–99)
HBA1C MFR BLD: 6.3 % (ref ?–5.7)
HCT VFR BLD AUTO: 32.4 %
HDLC SERPL-MCNC: 66 MG/DL (ref 40–59)
HGB BLD-MCNC: 10.6 G/DL
IMM GRANULOCYTES # BLD AUTO: 0.04 X10(3) UL (ref 0–1)
IMM GRANULOCYTES NFR BLD: 0.3 %
LDLC SERPL CALC-MCNC: 62 MG/DL (ref ?–100)
LYMPHOCYTES # BLD AUTO: 2.55 X10(3) UL (ref 1–4)
LYMPHOCYTES NFR BLD AUTO: 20.6 %
MCH RBC QN AUTO: 31.6 PG (ref 26–34)
MCHC RBC AUTO-ENTMCNC: 32.7 G/DL (ref 31–37)
MCV RBC AUTO: 96.7 FL
MONOCYTES # BLD AUTO: 0.97 X10(3) UL (ref 0.1–1)
MONOCYTES NFR BLD AUTO: 7.9 %
NEUTROPHILS # BLD AUTO: 8.53 X10 (3) UL (ref 1.5–7.7)
NEUTROPHILS # BLD AUTO: 8.53 X10(3) UL (ref 1.5–7.7)
NEUTROPHILS NFR BLD AUTO: 69.1 %
NONHDLC SERPL-MCNC: 82 MG/DL (ref ?–130)
NT-PROBNP SERPL-MCNC: 9852 PG/ML (ref ?–450)
OSMOLALITY SERPL CALC.SUM OF ELEC: 303 MOSM/KG (ref 275–295)
P AXIS: 82 DEGREES
P-R INTERVAL: 164 MS
PLATELET # BLD AUTO: 246 10(3)UL (ref 150–450)
POTASSIUM SERPL-SCNC: 4.3 MMOL/L (ref 3.5–5.1)
Q-T INTERVAL: 336 MS
QRS DURATION: 110 MS
QTC CALCULATION (BEZET): 454 MS
R AXIS: -49 DEGREES
RBC # BLD AUTO: 3.35 X10(6)UL
RSV RNA SPEC NAA+PROBE: NEGATIVE
SARS-COV-2 RNA RESP QL NAA+PROBE: NOT DETECTED
SODIUM SERPL-SCNC: 141 MMOL/L (ref 136–145)
T AXIS: 92 DEGREES
TRIGL SERPL-MCNC: 110 MG/DL (ref 30–149)
TROPONIN I HIGH SENSITIVITY: 279 NG/L
TROPONIN I HIGH SENSITIVITY: 296 NG/L
VENTRICULAR RATE: 110 BPM
VLDLC SERPL CALC-MCNC: 16 MG/DL (ref 0–30)
WBC # BLD AUTO: 12.4 X10(3) UL (ref 4–11)

## 2023-02-24 PROCEDURE — 99223 1ST HOSP IP/OBS HIGH 75: CPT | Performed by: HOSPITALIST

## 2023-02-24 PROCEDURE — 71260 CT THORAX DX C+: CPT | Performed by: EMERGENCY MEDICINE

## 2023-02-24 PROCEDURE — 93306 TTE W/DOPPLER COMPLETE: CPT

## 2023-02-24 PROCEDURE — 71045 X-RAY EXAM CHEST 1 VIEW: CPT | Performed by: EMERGENCY MEDICINE

## 2023-02-24 RX ORDER — HEPARIN SODIUM 1000 [USP'U]/ML
60 INJECTION, SOLUTION INTRAVENOUS; SUBCUTANEOUS ONCE
Status: COMPLETED | OUTPATIENT
Start: 2023-02-24 | End: 2023-02-24

## 2023-02-24 RX ORDER — FUROSEMIDE 10 MG/ML
40 INJECTION INTRAMUSCULAR; INTRAVENOUS
Status: DISCONTINUED | OUTPATIENT
Start: 2023-02-24 | End: 2023-02-25

## 2023-02-24 RX ORDER — ACETAMINOPHEN 500 MG
500 TABLET ORAL EVERY 4 HOURS PRN
Status: DISCONTINUED | OUTPATIENT
Start: 2023-02-24 | End: 2023-02-25

## 2023-02-24 RX ORDER — ASPIRIN 81 MG/1
324 TABLET, CHEWABLE ORAL ONCE
Status: COMPLETED | OUTPATIENT
Start: 2023-02-24 | End: 2023-02-24

## 2023-02-24 RX ORDER — PANTOPRAZOLE SODIUM 40 MG/1
40 TABLET, DELAYED RELEASE ORAL
Status: DISCONTINUED | OUTPATIENT
Start: 2023-02-25 | End: 2023-02-25

## 2023-02-24 RX ORDER — HEPARIN SODIUM AND DEXTROSE 10000; 5 [USP'U]/100ML; G/100ML
INJECTION INTRAVENOUS CONTINUOUS
Status: DISCONTINUED | OUTPATIENT
Start: 2023-02-24 | End: 2023-02-24

## 2023-02-24 RX ORDER — FUROSEMIDE 10 MG/ML
40 INJECTION INTRAMUSCULAR; INTRAVENOUS ONCE
Status: COMPLETED | OUTPATIENT
Start: 2023-02-24 | End: 2023-02-24

## 2023-02-24 RX ORDER — TRAMADOL HYDROCHLORIDE 50 MG/1
50 TABLET ORAL EVERY 4 HOURS PRN
Status: DISCONTINUED | OUTPATIENT
Start: 2023-02-24 | End: 2023-02-25

## 2023-02-24 RX ORDER — NICOTINE POLACRILEX 4 MG
15 LOZENGE BUCCAL
Status: DISCONTINUED | OUTPATIENT
Start: 2023-02-24 | End: 2023-02-25

## 2023-02-24 RX ORDER — ONDANSETRON 2 MG/ML
4 INJECTION INTRAMUSCULAR; INTRAVENOUS EVERY 6 HOURS PRN
Status: DISCONTINUED | OUTPATIENT
Start: 2023-02-24 | End: 2023-02-25

## 2023-02-24 RX ORDER — POLYETHYLENE GLYCOL 3350 17 G/17G
17 POWDER, FOR SOLUTION ORAL DAILY PRN
Status: DISCONTINUED | OUTPATIENT
Start: 2023-02-24 | End: 2023-02-25

## 2023-02-24 RX ORDER — DEXTROSE MONOHYDRATE 25 G/50ML
50 INJECTION, SOLUTION INTRAVENOUS
Status: DISCONTINUED | OUTPATIENT
Start: 2023-02-24 | End: 2023-02-25

## 2023-02-24 RX ORDER — HEPARIN SODIUM 5000 [USP'U]/ML
5000 INJECTION, SOLUTION INTRAVENOUS; SUBCUTANEOUS EVERY 12 HOURS SCHEDULED
Status: DISCONTINUED | OUTPATIENT
Start: 2023-02-24 | End: 2023-02-25

## 2023-02-24 RX ORDER — LOPERAMIDE HYDROCHLORIDE 2 MG/1
2 CAPSULE ORAL 4 TIMES DAILY PRN
Status: DISCONTINUED | OUTPATIENT
Start: 2023-02-24 | End: 2023-02-25

## 2023-02-24 RX ORDER — HYDROCODONE BITARTRATE AND ACETAMINOPHEN 5; 325 MG/1; MG/1
1 TABLET ORAL ONCE
Status: DISCONTINUED | OUTPATIENT
Start: 2023-02-24 | End: 2023-02-24

## 2023-02-24 RX ORDER — TEMAZEPAM 15 MG/1
15 CAPSULE ORAL NIGHTLY PRN
Status: DISCONTINUED | OUTPATIENT
Start: 2023-02-24 | End: 2023-02-25

## 2023-02-24 RX ORDER — HEPARIN SODIUM AND DEXTROSE 10000; 5 [USP'U]/100ML; G/100ML
12 INJECTION INTRAVENOUS ONCE
Status: COMPLETED | OUTPATIENT
Start: 2023-02-24 | End: 2023-02-24

## 2023-02-24 RX ORDER — ACETAMINOPHEN 500 MG
1000 TABLET ORAL 2 TIMES DAILY PRN
COMMUNITY

## 2023-02-24 RX ORDER — TRAMADOL HYDROCHLORIDE 50 MG/1
50 TABLET ORAL EVERY 8 HOURS PRN
Status: DISCONTINUED | OUTPATIENT
Start: 2023-02-24 | End: 2023-02-24

## 2023-02-24 RX ORDER — AMLODIPINE BESYLATE 10 MG/1
10 TABLET ORAL DAILY
Status: DISCONTINUED | OUTPATIENT
Start: 2023-02-25 | End: 2023-02-25

## 2023-02-24 RX ORDER — LISINOPRIL 40 MG/1
40 TABLET ORAL DAILY
Status: DISCONTINUED | OUTPATIENT
Start: 2023-02-25 | End: 2023-02-25

## 2023-02-24 RX ORDER — CARVEDILOL 12.5 MG/1
12.5 TABLET ORAL 2 TIMES DAILY WITH MEALS
Status: DISCONTINUED | OUTPATIENT
Start: 2023-02-24 | End: 2023-02-25

## 2023-02-24 RX ORDER — SENNOSIDES 8.6 MG
17.2 TABLET ORAL NIGHTLY PRN
Status: DISCONTINUED | OUTPATIENT
Start: 2023-02-24 | End: 2023-02-25

## 2023-02-24 RX ORDER — NICOTINE POLACRILEX 4 MG
30 LOZENGE BUCCAL
Status: DISCONTINUED | OUTPATIENT
Start: 2023-02-24 | End: 2023-02-25

## 2023-02-24 NOTE — ED QUICK NOTES
Orders for admission, patient is aware of plan and ready to go upstairs. Any questions, please call ED RN jean paul  at extension [phone].    Patient presents with:  Difficulty Breathing      Ambulation: normally ambulates with walker, did not ambulate patient in ed  Belongings: accompanying patient  Medications: see mar  IV: 18g r forearm  Language: english  COVID-19 suspicion level/status: pending  CIWA SCORE: na  NIH: na  Other pertinent information:  Patient on bipap

## 2023-02-24 NOTE — ED INITIAL ASSESSMENT (HPI)
Patient to ed via ems. Per medics patient's family called 911 due to patient having increased work of breathing while ambulating to bathroom. Patient arrives to ed on cpap.  Received 0.4nitroglycerin pta

## 2023-02-25 VITALS
OXYGEN SATURATION: 92 % | HEART RATE: 90 BPM | TEMPERATURE: 98 F | RESPIRATION RATE: 18 BRPM | WEIGHT: 123 LBS | SYSTOLIC BLOOD PRESSURE: 144 MMHG | BODY MASS INDEX: 23 KG/M2 | DIASTOLIC BLOOD PRESSURE: 73 MMHG

## 2023-02-25 LAB
ANION GAP SERPL CALC-SCNC: 6 MMOL/L (ref 0–18)
BASOPHILS # BLD AUTO: 0.04 X10(3) UL (ref 0–0.2)
BASOPHILS NFR BLD AUTO: 0.6 %
BUN BLD-MCNC: 30 MG/DL (ref 7–18)
BUN/CREAT SERPL: 30.9 (ref 10–20)
CALCIUM BLD-MCNC: 8.7 MG/DL (ref 8.5–10.1)
CHLORIDE SERPL-SCNC: 107 MMOL/L (ref 98–112)
CO2 SERPL-SCNC: 27 MMOL/L (ref 21–32)
CREAT BLD-MCNC: 0.97 MG/DL
DEPRECATED RDW RBC AUTO: 48.5 FL (ref 35.1–46.3)
EOSINOPHIL # BLD AUTO: 0.17 X10(3) UL (ref 0–0.7)
EOSINOPHIL NFR BLD AUTO: 2.6 %
ERYTHROCYTE [DISTWIDTH] IN BLOOD BY AUTOMATED COUNT: 14.2 % (ref 11–15)
GFR SERPLBLD BASED ON 1.73 SQ M-ARVRAT: 56 ML/MIN/1.73M2 (ref 60–?)
GLUCOSE BLD-MCNC: 144 MG/DL (ref 70–99)
GLUCOSE BLDC GLUCOMTR-MCNC: 130 MG/DL (ref 70–99)
GLUCOSE BLDC GLUCOMTR-MCNC: 187 MG/DL (ref 70–99)
HCT VFR BLD AUTO: 29.6 %
HGB BLD-MCNC: 9.7 G/DL
IMM GRANULOCYTES # BLD AUTO: 0.03 X10(3) UL (ref 0–1)
IMM GRANULOCYTES NFR BLD: 0.5 %
LYMPHOCYTES # BLD AUTO: 0.93 X10(3) UL (ref 1–4)
LYMPHOCYTES NFR BLD AUTO: 14.2 %
MCH RBC QN AUTO: 30.9 PG (ref 26–34)
MCHC RBC AUTO-ENTMCNC: 32.8 G/DL (ref 31–37)
MCV RBC AUTO: 94.3 FL
MONOCYTES # BLD AUTO: 0.66 X10(3) UL (ref 0.1–1)
MONOCYTES NFR BLD AUTO: 10.1 %
NEUTROPHILS # BLD AUTO: 4.71 X10 (3) UL (ref 1.5–7.7)
NEUTROPHILS # BLD AUTO: 4.71 X10(3) UL (ref 1.5–7.7)
NEUTROPHILS NFR BLD AUTO: 72 %
OSMOLALITY SERPL CALC.SUM OF ELEC: 299 MOSM/KG (ref 275–295)
PLATELET # BLD AUTO: 212 10(3)UL (ref 150–450)
POTASSIUM SERPL-SCNC: 4 MMOL/L (ref 3.5–5.1)
RBC # BLD AUTO: 3.14 X10(6)UL
SODIUM SERPL-SCNC: 140 MMOL/L (ref 136–145)
WBC # BLD AUTO: 6.5 X10(3) UL (ref 4–11)

## 2023-02-25 PROCEDURE — 99239 HOSP IP/OBS DSCHRG MGMT >30: CPT | Performed by: HOSPITALIST

## 2023-02-25 RX ORDER — SPIRONOLACTONE 25 MG/1
12.5 TABLET ORAL DAILY
Status: DISCONTINUED | OUTPATIENT
Start: 2023-02-25 | End: 2023-02-25

## 2023-02-25 RX ORDER — TORSEMIDE 10 MG/1
5 TABLET ORAL DAILY
Qty: 30 TABLET | Refills: 2 | Status: SHIPPED | OUTPATIENT
Start: 2023-02-25

## 2023-02-25 RX ORDER — SPIRONOLACTONE 25 MG/1
12.5 TABLET ORAL DAILY
Qty: 30 TABLET | Refills: 2 | Status: SHIPPED | OUTPATIENT
Start: 2023-02-25

## 2023-02-25 RX ORDER — TORSEMIDE 5 MG/1
5 TABLET ORAL DAILY
Status: DISCONTINUED | OUTPATIENT
Start: 2023-02-25 | End: 2023-02-25

## 2023-02-25 RX ORDER — LISINOPRIL 20 MG/1
20 TABLET ORAL DAILY
Qty: 30 TABLET | Refills: 2 | Status: SHIPPED | OUTPATIENT
Start: 2023-02-26

## 2023-02-25 RX ORDER — LISINOPRIL 20 MG/1
20 TABLET ORAL DAILY
Status: DISCONTINUED | OUTPATIENT
Start: 2023-02-26 | End: 2023-02-25

## 2023-02-25 NOTE — CM/SW NOTE
02/25/23 1600   Discharge disposition   Expected discharge disposition Home-Health   Post Acute Care Provider   ([facility])   Discharge transportation Private car     [facility] only accepting agency. Reserved in [address]. SW spoke w/ pt who is agreeable w/ [facility], as long as it's a covered services. Per [facility], they have to obtain prior authorization which will not be check until Monday the earliest. Pt requested to be notified of any copays or it's not covered, pt will be okay and stated she'll follow up with her PCP.      [facility]   Ph: [phone]  Fax: [phone] [place], [place] - [phone]  (02/25/23, [phone])

## 2023-02-25 NOTE — PLAN OF CARE
Patient admitted 02/24/23 for SOB, diagnosed with volume overload. Currently being treated with Lasix 40 mg BID. Orders for strict I/O, daily weights. Patients most current echo 30-35% EF. Cardiology following patient and adjusting medication regimen. Patient agreeable to changes. No new sxs reported. Does endorse chronic pain to knees. Patient alternating between Tylenol and Tramadol. Educated on non pharmacological pain interventions such as repositioning. Advised to alert nursing staff with any new sxs throughout the night. Problem: Patient Centered Care  Goal: Patient preferences are identified and integrated in the patient's plan of care  Description: Interventions:  - What would you like us to know as we care for you?  I am always in pain   - Provide timely, complete, and accurate information to patient/family  - Incorporate patient and family knowledge, values, beliefs, and cultural backgrounds into the planning and delivery of care  - Encourage patient/family to participate in care and decision-making at the level they choose  - Honor patient and family perspectives and choices  Outcome: Progressing       Problem: Diabetes/Glucose Control  Goal: Glucose maintained within prescribed range  Description: INTERVENTIONS:  - Monitor Blood Glucose as ordered  - Assess for signs and symptoms of hyperglycemia and hypoglycemia  - Administer ordered medications to maintain glucose within target range  - Assess barriers to adequate nutritional intake and initiate nutrition consult as needed  - Instruct patient on self management of diabetes  Outcome: Progressing

## 2023-02-25 NOTE — CM/SW NOTE
Department  notified of request for [facility], pancho referrals started. Assigned CM/SW to follow up with pt/family on further discharge planning.      Emilie Rivas   February 25, 2023   13:56

## 2023-02-25 NOTE — PLAN OF CARE
Ms. Deepak Heck is Aox4 with some forgetfulness, stand-by assist, intermittent of bowel and bladder. VSS, denies pain. Plan:  continue to diurese, collect stool for cdiff, possible discharge pending cardiology clearance. Will continue to monitor and treat.     Problem: Patient Centered Care  Goal: Patient preferences are identified and integrated in the patient's plan of care  Description: Interventions:  - What would you like us to know as we care for you?   - Provide timely, complete, and accurate information to patient/family  - Incorporate patient and family knowledge, values, beliefs, and cultural backgrounds into the planning and delivery of care  - Encourage patient/family to participate in care and decision-making at the level they choose  - Honor patient and family perspectives and choices  Outcome: Progressing     Problem: Diabetes/Glucose Control  Goal: Glucose maintained within prescribed range  Description: INTERVENTIONS:  - Monitor Blood Glucose as ordered  - Assess for signs and symptoms of hyperglycemia and hypoglycemia  - Administer ordered medications to maintain glucose within target range  - Assess barriers to adequate nutritional intake and initiate nutrition consult as needed  - Instruct patient on self management of diabetes  Outcome: Progressing     Problem: Patient/Family Goals  Goal: Patient/Family Long Term Goal  Description: Patient's Long Term Goal: Discharge Home    Interventions:  - medication management, education, and compliance  - See additional Care Plan goals for specific interventions  Outcome: Progressing  Goal: Patient/Family Short Term Goal  Description: Patient's Short Term Goal: Remain free from shortness of breath    Interventions:   - medication management, education, and compliance  - See additional Care Plan goals for specific interventions  Outcome: Progressing     Problem: CARDIOVASCULAR - ADULT  Goal: Maintains optimal cardiac output and hemodynamic stability  Description: INTERVENTIONS:  - Monitor vital signs, rhythm, and trends  - Monitor for bleeding, hypotension and signs of decreased cardiac output  - Evaluate effectiveness of vasoactive medications to optimize hemodynamic stability  - Monitor arterial and/or venous puncture sites for bleeding and/or hematoma  - Assess quality of pulses, skin color and temperature  - Assess for signs of decreased coronary artery perfusion - ex.  Angina  - Evaluate fluid balance, assess for edema, trend weights  Outcome: Progressing  Goal: Absence of cardiac arrhythmias or at baseline  Description: INTERVENTIONS:  - Continuous cardiac monitoring, monitor vital signs, obtain 12 lead EKG if indicated  - Evaluate effectiveness of antiarrhythmic and heart rate control medications as ordered  - Initiate emergency measures for life threatening arrhythmias  - Monitor electrolytes and administer replacement therapy as ordered  Outcome: Progressing     Problem: GASTROINTESTINAL - ADULT  Goal: Minimal or absence of nausea and vomiting  Description: INTERVENTIONS:  - Maintain adequate hydration with IV or PO as ordered and tolerated  - Nasogastric tube to low intermittent suction as ordered  - Evaluate effectiveness of ordered antiemetic medications  - Provide nonpharmacologic comfort measures as appropriate  - Advance diet as tolerated, if ordered  - Obtain nutritional consult as needed  - Evaluate fluid balance  Outcome: Progressing  Goal: Maintains or returns to baseline bowel function  Description: INTERVENTIONS:  - Assess bowel function  - Maintain adequate hydration with IV or PO as ordered and tolerated  - Evaluate effectiveness of GI medications  - Encourage mobilization and activity  - Obtain nutritional consult as needed  - Establish a toileting routine/schedule  - Consider collaborating with pharmacy to review patient's medication profile  Outcome: Progressing     Problem: METABOLIC/FLUID AND ELECTROLYTES - ADULT  Goal: Glucose maintained within prescribed range  Description: INTERVENTIONS:  - Monitor Blood Glucose as ordered  - Assess for signs and symptoms of hyperglycemia and hypoglycemia  - Administer ordered medications to maintain glucose within target range  - Assess barriers to adequate nutritional intake and initiate nutrition consult as needed  - Instruct patient on self management of diabetes  Outcome: Progressing  Goal: Electrolytes maintained within normal limits  Description: INTERVENTIONS:  - Monitor labs and rhythm and assess patient for signs and symptoms of electrolyte imbalances  - Administer electrolyte replacement as ordered  - Monitor response to electrolyte replacements, including rhythm and repeat lab results as appropriate  - Fluid restriction as ordered  - Instruct patient on fluid and nutrition restrictions as appropriate  Outcome: Progressing     Problem: SKIN/TISSUE INTEGRITY - ADULT  Goal: Skin integrity remains intact  Description: INTERVENTIONS  - Assess and document risk factors for pressure ulcer development  - Assess and document skin integrity  - Monitor for areas of redness and/or skin breakdown  - Initiate interventions, skin care algorithm/standards of care as needed  Outcome: Progressing     Problem: HEMATOLOGIC - ADULT  Goal: Maintains hematologic stability  Description: INTERVENTIONS  - Assess for signs and symptoms of bleeding or hemorrhage  - Monitor labs and vital signs for trends  - Administer supportive blood products/factors, fluids and medications as ordered and appropriate  - Administer supportive blood products/factors as ordered and appropriate  Outcome: Progressing  Goal: Free from bleeding injury  Description: (Example usage: patient with low platelets)  INTERVENTIONS:  - Avoid intramuscular injections, enemas and rectal medication administration  - Ensure safe mobilization of patient  - Hold pressure on venipuncture sites to achieve adequate hemostasis  - Assess for signs and symptoms of internal bleeding  - Monitor lab trends  - Patient is to report abnormal signs of bleeding to staff  - Avoid use of toothpicks and dental floss  - Use electric shaver for shaving  - Use soft bristle tooth brush  - Limit straining and forceful nose blowing  Outcome: Progressing     Problem: MUSCULOSKELETAL - ADULT  Goal: Return mobility to safest level of function  Description: INTERVENTIONS:  - Assess patient stability and activity tolerance for standing, transferring and ambulating w/ or w/o assistive devices  - Assist with transfers and ambulation using safe patient handling equipment as needed  - Ensure adequate protection for wounds/incisions during mobilization  - Obtain PT/OT consults as needed  - Advance activity as appropriate  - Communicate ordered activity level and limitations with patient/family  Outcome: Progressing     Problem: Impaired Activities of Daily Living  Goal: Achieve highest/safest level of independence in self care  Description: Interventions:  - Assess ability and encourage patient to participate in ADLs to maximize function  - Promote sitting position while performing ADLs such as feeding, grooming, and bathing  - Educate and encourage patient/family in tolerated functional activity level and precautions during self-care  - Encourage patient to incorporate impaired side during daily activities to promote function  Outcome: Progressing

## 2023-02-26 NOTE — DISCHARGE PLANNING
Patient to be discharged to home with daughter. Discharge instructions printed and endorsed to overnight RN for discharge instructions with daughter upon arrival.  Daughter ETA 20:00.

## 2023-02-27 ENCOUNTER — PATIENT OUTREACH (OUTPATIENT)
Dept: CASE MANAGEMENT | Age: 88
End: 2023-02-27

## 2023-02-28 ENCOUNTER — TELEPHONE (OUTPATIENT)
Dept: INTERNAL MEDICINE CLINIC | Facility: CLINIC | Age: 88
End: 2023-02-28

## 2023-02-28 NOTE — TELEPHONE ENCOUNTER
FYI: Dr Luann Gaines is fully booked, only \"res24\" is available can we offer those appointment,  Please advise, Thank you.     Please reply to pool: EM CC IM FM ALG RHE

## 2023-02-28 NOTE — PROGRESS NOTES
"[employer]hart message sent to the pt for TCM. NCM contact information was included in message. TE will be sent to PCP office to FU on HFU appt as pt is a high risk for readmission.

## 2023-02-28 NOTE — TELEPHONE ENCOUNTER
Attempted to contact pt TCM however unsuccessful. Pt does not have HFU appt scheduled at this time. TCM/HFU appt recommended by 3/4/23 as pt is a high risk for readmission. Please advise. BOOK BY DATE (last date for TCM): 3/11/23    Clinical staff:  Please f/u with pt and try to get them to schedule as pt would greatly benefit from TCM/HFU appt. Thank you!

## 2023-03-03 NOTE — PAYOR COMM NOTE
Discharge Notification    Patient Name: Kayleigh Janette: Peng Espinozamargret MONTE PPO  Subscriber #: [id number]  Authorization Number: [id number]  Admit Date/Time: 2/24/2023 8:05 AM  Discharge Date/Time: 2/25/2023 9:18 PM

## 2023-03-09 ENCOUNTER — MED REC SCAN ONLY (OUTPATIENT)
Dept: INTERNAL MEDICINE CLINIC | Facility: CLINIC | Age: 88
End: 2023-03-09

## 2023-04-12 ENCOUNTER — MED REC SCAN ONLY (OUTPATIENT)
Dept: INTERNAL MEDICINE CLINIC | Facility: CLINIC | Age: 88
End: 2023-04-12

## 2023-04-20 ENCOUNTER — TELEPHONE (OUTPATIENT)
Dept: INTERNAL MEDICINE CLINIC | Facility: CLINIC | Age: 88
End: 2023-04-20

## 2023-04-20 NOTE — TELEPHONE ENCOUNTER
Liberty Mendieta from [facility] would like to know if we received orders she faxed on 4/19/23?  Please advise

## 2023-04-21 ENCOUNTER — HOSPITAL ENCOUNTER (OUTPATIENT)
Facility: HOSPITAL | Age: 88
Setting detail: OBSERVATION
Discharge: HOME HEALTH | End: 2023-04-25
Attending: EMERGENCY MEDICINE | Admitting: HOSPITALIST
Payer: MEDICARE

## 2023-04-21 ENCOUNTER — APPOINTMENT (OUTPATIENT)
Dept: CT IMAGING | Facility: HOSPITAL | Age: 88
End: 2023-04-21
Attending: EMERGENCY MEDICINE
Payer: MEDICARE

## 2023-04-21 DIAGNOSIS — R19.7 DIARRHEA, UNSPECIFIED TYPE: Primary | ICD-10-CM

## 2023-04-21 DIAGNOSIS — R10.9 ABDOMINAL PAIN OF UNKNOWN ETIOLOGY: ICD-10-CM

## 2023-04-21 DIAGNOSIS — K62.5 RECTAL BLEEDING: ICD-10-CM

## 2023-04-21 PROBLEM — R79.89 AZOTEMIA: Status: ACTIVE | Noted: 2023-04-21

## 2023-04-21 PROBLEM — D64.9 ANEMIA: Status: ACTIVE | Noted: 2023-04-21

## 2023-04-21 PROBLEM — R73.9 HYPERGLYCEMIA: Status: ACTIVE | Noted: 2023-04-21

## 2023-04-21 LAB
ALBUMIN SERPL-MCNC: 3.2 G/DL (ref 3.4–5)
ALP LIVER SERPL-CCNC: 80 U/L
ALT SERPL-CCNC: 20 U/L
ANION GAP SERPL CALC-SCNC: 7 MMOL/L (ref 0–18)
AST SERPL-CCNC: 23 U/L (ref 15–37)
BASOPHILS # BLD AUTO: 0.04 X10(3) UL (ref 0–0.2)
BASOPHILS NFR BLD AUTO: 0.5 %
BILIRUB DIRECT SERPL-MCNC: 0.2 MG/DL (ref 0–0.2)
BILIRUB SERPL-MCNC: 0.6 MG/DL (ref 0.1–2)
BILIRUB UR QL: NEGATIVE
BUN BLD-MCNC: 47 MG/DL (ref 7–18)
BUN/CREAT SERPL: 34.1 (ref 10–20)
CALCIUM BLD-MCNC: 8.7 MG/DL (ref 8.5–10.1)
CHLORIDE SERPL-SCNC: 111 MMOL/L (ref 98–112)
CLARITY UR: CLEAR
CO2 SERPL-SCNC: 25 MMOL/L (ref 21–32)
COLOR UR: YELLOW
CREAT BLD-MCNC: 1.38 MG/DL
DEPRECATED RDW RBC AUTO: 50 FL (ref 35.1–46.3)
EOSINOPHIL # BLD AUTO: 0.1 X10(3) UL (ref 0–0.7)
EOSINOPHIL NFR BLD AUTO: 1.1 %
ERYTHROCYTE [DISTWIDTH] IN BLOOD BY AUTOMATED COUNT: 14.4 % (ref 11–15)
GFR SERPLBLD BASED ON 1.73 SQ M-ARVRAT: 36 ML/MIN/1.73M2 (ref 60–?)
GLUCOSE BLD-MCNC: 207 MG/DL (ref 70–99)
GLUCOSE UR-MCNC: NORMAL MG/DL
HCT VFR BLD AUTO: 33.2 %
HGB BLD-MCNC: 10.8 G/DL
HGB UR QL STRIP.AUTO: NEGATIVE
HYALINE CASTS #/AREA URNS AUTO: PRESENT /LPF
IMM GRANULOCYTES # BLD AUTO: 0.04 X10(3) UL (ref 0–1)
IMM GRANULOCYTES NFR BLD: 0.5 %
KETONES UR-MCNC: NEGATIVE MG/DL
LEUKOCYTE ESTERASE UR QL STRIP.AUTO: NEGATIVE
LIPASE SERPL-CCNC: 36 U/L (ref 13–75)
LYMPHOCYTES # BLD AUTO: 0.6 X10(3) UL (ref 1–4)
LYMPHOCYTES NFR BLD AUTO: 6.9 %
MCH RBC QN AUTO: 30.9 PG (ref 26–34)
MCHC RBC AUTO-ENTMCNC: 32.5 G/DL (ref 31–37)
MCV RBC AUTO: 95.1 FL
MONOCYTES # BLD AUTO: 0.27 X10(3) UL (ref 0.1–1)
MONOCYTES NFR BLD AUTO: 3.1 %
NEUTROPHILS # BLD AUTO: 7.69 X10 (3) UL (ref 1.5–7.7)
NEUTROPHILS # BLD AUTO: 7.69 X10(3) UL (ref 1.5–7.7)
NEUTROPHILS NFR BLD AUTO: 87.9 %
NITRITE UR QL STRIP.AUTO: NEGATIVE
OSMOLALITY SERPL CALC.SUM OF ELEC: 314 MOSM/KG (ref 275–295)
PH UR: 5.5 [PH] (ref 5–8)
PLATELET # BLD AUTO: 219 10(3)UL (ref 150–450)
POTASSIUM SERPL-SCNC: 4.6 MMOL/L (ref 3.5–5.1)
PROT SERPL-MCNC: 6.7 G/DL (ref 6.4–8.2)
PROT UR-MCNC: 20 MG/DL
RBC # BLD AUTO: 3.49 X10(6)UL
SODIUM SERPL-SCNC: 143 MMOL/L (ref 136–145)
SP GR UR STRIP: 1.01 (ref 1–1.03)
UROBILINOGEN UR STRIP-ACNC: NORMAL
WBC # BLD AUTO: 8.7 X10(3) UL (ref 4–11)

## 2023-04-21 PROCEDURE — 74177 CT ABD & PELVIS W/CONTRAST: CPT | Performed by: EMERGENCY MEDICINE

## 2023-04-21 RX ORDER — SODIUM CHLORIDE 9 MG/ML
1000 INJECTION, SOLUTION INTRAVENOUS ONCE
Status: COMPLETED | OUTPATIENT
Start: 2023-04-21 | End: 2023-04-23

## 2023-04-21 RX ORDER — TRAMADOL HYDROCHLORIDE 50 MG/1
50 TABLET ORAL ONCE
Status: COMPLETED | OUTPATIENT
Start: 2023-04-21 | End: 2023-04-21

## 2023-04-21 RX ORDER — ACETAMINOPHEN 325 MG/1
650 TABLET ORAL ONCE
Status: COMPLETED | OUTPATIENT
Start: 2023-04-21 | End: 2023-04-21

## 2023-04-21 NOTE — ED INITIAL ASSESSMENT (HPI)
Pt to ED via EMS pt called via her medical alarm d/t being stuck in bed all day with continuous diarrhea watery. Pt received 400cc bolus en route to ED. AXOX4.

## 2023-04-22 PROBLEM — K62.5 RECTAL BLEEDING: Status: ACTIVE | Noted: 2023-04-22

## 2023-04-22 PROBLEM — R10.9 ABDOMINAL PAIN OF UNKNOWN ETIOLOGY: Status: ACTIVE | Noted: 2023-04-22

## 2023-04-22 LAB
ANION GAP SERPL CALC-SCNC: 9 MMOL/L (ref 0–18)
ANTIBODY SCREEN: NEGATIVE
BUN BLD-MCNC: 42 MG/DL (ref 7–18)
BUN/CREAT SERPL: 38.9 (ref 10–20)
CALCIUM BLD-MCNC: 8.4 MG/DL (ref 8.5–10.1)
CHLORIDE SERPL-SCNC: 113 MMOL/L (ref 98–112)
CO2 SERPL-SCNC: 22 MMOL/L (ref 21–32)
CREAT BLD-MCNC: 1.08 MG/DL
DEPRECATED HBV CORE AB SER IA-ACNC: 168.5 NG/ML
GFR SERPLBLD BASED ON 1.73 SQ M-ARVRAT: 49 ML/MIN/1.73M2 (ref 60–?)
GLUCOSE BLD-MCNC: 136 MG/DL (ref 70–99)
HCT VFR BLD AUTO: 27 %
HCT VFR BLD AUTO: 28.8 %
HCT VFR BLD AUTO: 29.5 %
HCT VFR BLD AUTO: 29.5 %
HGB BLD-MCNC: 8.9 G/DL
HGB BLD-MCNC: 9.4 G/DL
HGB BLD-MCNC: 9.7 G/DL
HGB BLD-MCNC: 9.7 G/DL
IRON SATN MFR SERPL: 5 %
IRON SERPL-MCNC: 13 UG/DL
MAGNESIUM SERPL-MCNC: 1.4 MG/DL (ref 1.6–2.6)
OSMOLALITY SERPL CALC.SUM OF ELEC: 311 MOSM/KG (ref 275–295)
PHOSPHATE SERPL-MCNC: 3.6 MG/DL (ref 2.5–4.9)
POTASSIUM SERPL-SCNC: 3.9 MMOL/L (ref 3.5–5.1)
RH BLOOD TYPE: NEGATIVE
SODIUM SERPL-SCNC: 144 MMOL/L (ref 136–145)
TIBC SERPL-MCNC: 274 UG/DL (ref 240–450)
TRANSFERRIN SERPL-MCNC: 184 MG/DL (ref 200–360)

## 2023-04-22 PROCEDURE — 99223 1ST HOSP IP/OBS HIGH 75: CPT | Performed by: INTERNAL MEDICINE

## 2023-04-22 PROCEDURE — 99222 1ST HOSP IP/OBS MODERATE 55: CPT | Performed by: HOSPITALIST

## 2023-04-22 RX ORDER — ACETAMINOPHEN 500 MG
500 TABLET ORAL EVERY 6 HOURS PRN
Status: DISCONTINUED | OUTPATIENT
Start: 2023-04-22 | End: 2023-04-25

## 2023-04-22 RX ORDER — TRAMADOL HYDROCHLORIDE 50 MG/1
50 TABLET ORAL EVERY 12 HOURS PRN
Status: DISCONTINUED | OUTPATIENT
Start: 2023-04-22 | End: 2023-04-25

## 2023-04-22 RX ORDER — CARVEDILOL 12.5 MG/1
12.5 TABLET ORAL 2 TIMES DAILY WITH MEALS
Status: DISCONTINUED | OUTPATIENT
Start: 2023-04-22 | End: 2023-04-25

## 2023-04-22 RX ORDER — ONDANSETRON 2 MG/ML
4 INJECTION INTRAMUSCULAR; INTRAVENOUS EVERY 6 HOURS PRN
Status: DISCONTINUED | OUTPATIENT
Start: 2023-04-22 | End: 2023-04-25

## 2023-04-22 RX ORDER — MAGNESIUM OXIDE 400 MG/1
800 TABLET ORAL ONCE
Status: COMPLETED | OUTPATIENT
Start: 2023-04-22 | End: 2023-04-22

## 2023-04-22 RX ORDER — SODIUM CHLORIDE 9 MG/ML
INJECTION, SOLUTION INTRAVENOUS CONTINUOUS
Status: DISCONTINUED | OUTPATIENT
Start: 2023-04-22 | End: 2023-04-25

## 2023-04-22 NOTE — PROGRESS NOTES
04/22/23 1559   VISIT TYPE   OT Inpatient Visit Type (Documentation Required) Attempted Evaluation     Orders received and chart reviewed. Per pt and PT, pt's symptoms have stabilized and pt has returned to functioning baseline. Pt has no OT needs at this time. Will sign of. Thank you for this consult.

## 2023-04-22 NOTE — ED QUICK NOTES
Orders for admission, patient is aware of plan and ready to go upstairs. Any questions, please call ED RN Valentin Trevino at extension [phone].      Patient Covid vaccination status: Fully vaccinated     COVID Test Ordered in ED: None    COVID Suspicion at Admission: N/A    Running Infusions:  None    Mental Status/LOC at time of transport: A&Ox2-3    Other pertinent information:   CIWA score: N/A   NIH score:  N/A

## 2023-04-22 NOTE — H&P
[facility]    PATIENT'S NAME: Ryan Nicholas   ATTENDING PHYSICIAN: Kasandra Holguin DO   PATIENT ACCOUNT#:   [de-identified]    LOCATION:  [facility] [address] #:   [id number]       YOB: 1932  ADMISSION DATE:       04/21/2023    HISTORY AND PHYSICAL EXAMINATION    DATE OF EXAMINATION:  04/22/2023    CHIEF COMPLAINT:  Abdominal pain and diarrhea. HISTORY OF PRESENT ILLNESS:  This is a very pleasant 42-year-old woman with a past medical history of some intermittent diarrheal episodes at home, for which she typically takes Lomotil. She had 2 very large episodes of diarrhea at home and took Lomotil. As this did not stop her symptoms, she presented to the emergency room for evaluation after calling 911. She also had mild diffuse abdominal discomfort. No nausea or vomiting. No fever or chills. She said her appetite has been normal.  No dysuria or hematuria. Her evaluation in the emergency room included a CT scan of the abdomen and pelvis which revealed no evidence of bowel obstruction. There was mild circumferential wall thickening versus ________ distention of the descending colon as well and a few mild small-bowel loops with areas of fluid in the small and large bowel possibly related to the diarrheal state. Enteritis is a diagnostic consideration. There was borderline gallbladder distention without pericholecystic fluid or edema. There was an indeterminate 1.3 cm hypodense focus in the left lateral kidney. Glucose was 207, sodium 143, potassium 4.6, chloride 111, CO2 of 25, BUN of 47 with a creatinine of 1.38. The patient's previous creatinine was 0.97 on February 25 of this year. Calcium was 8.7. Anion gap was 7. Liver function tests were essentially normal.  Lipase was normal.  Albumin was 3.2. White count was 8.7 with a hemoglobin of 10.8 and a platelet count of 067,495. There were 87% neutrophils. Patient's hemoglobin in February was 9.7.   Urinalysis revealed no signs of infection. The patient was about to be discharged from the emergency room, when she had another bowel movement which was bloody. She has had no further bowel movements since her admission to the medical floor. PAST MEDICAL HISTORY:  Significant for chronic intermittent diarrhea; mitral valve regurgitation; type 2 diabetes mellitus; chronic anemia; renal insufficiency; hypertension; acute on chronic systolic and diastolic congestive heart failure; hyperlipidemia; acute coronary syndrome in the past; history of uterine cancer in the , for which she had radiation therapy and total abdominal hysterectomy; she has subsequently, however, dealt with radiation changes in her small bowel and probably also the colon; history of chronic osteoarthritis; multiple colonoscopies in the past; hernia surgery/umbilical hernia in ; knee arthroscopy in ; total abdominal hysterectomy in ; appendectomy in ; laparotomy and biopsy of stomach in  and ; cataract surgery bilaterally; radiation treatments to the pelvis; B12 deficiency; Garcia esophagus; degenerative disc disease of the lumbar spine. MEDICATIONS:  Prior to admission:  Carvedilol 12.5 mg twice a day; tramadol 50 mg q.12 h. as needed for pain; Tylenol 1000 mg twice a day as needed for pain; amlodipine 10 mg daily; lisinopril 20 mg daily; loperamide 2 mg 4 times a day as needed for diarrhea; multivitamin once a day; Nervive 1 tablet daily for peripheral neuropathy; omeprazole 40 mg daily; spironolactone     ALLERGIES:  No known drug allergies. FAMILY HISTORY:  The patient's father  at 59 of an MI. Her mother  at 80 of a CVA. SOCIAL HISTORY:  The patient is . She was a former smoker with a 0.6 pack-year tobacco history and quit in . She drinks a glass of wine with dinner on occasion. No history of drug use. She is a retired RN, used to work at [employer], was an instructor for nurses as well.   She currently lives with her daughter and her family. She has been  for about 4 years. She walks with the aid of a walker and a stair lift. She has had no falls. REVIEW OF SYSTEMS:  Twelve systems were reviewed. Patient reports her appetite has been good, her weight has been stable. She denies any chest pain or shortness of breath. No nausea or vomiting. No dysuria or increased frequency of urination. There are otherwise no additional pertinent positives or negatives on a 12-point review of systems except as listed in the History of Present Illness. PHYSICAL EXAMINATION:    GENERAL:  She was a pleasant 80-year-old woman who was in no acute distress. She reported feeling better than when she arrived in the emergency room. VITAL SIGNS:  Temperature was 98.7, pulse 66, respiratory rate 18, blood pressure 129/51, O2 saturation 99% on room air. HEENT:  Normocephalic, atraumatic. Pupils were postsurgical bilaterally. There was no sinus tenderness. Mucous membranes were dry. NECK:  Supple. LUNGS:  Essentially clear with easy respiratory excursions. I did not appreciate any wheezes or rhonchi. HEART:  Regular rate and rhythm, normal S1 and S2, and a systolic murmur. ABDOMEN:  Soft and nontender with normoactive bowel sounds. No guarding. No rebound. EXTREMITIES:  No clubbing, cyanosis, calf tenderness, or palpable cords. SKIN:  Warm and dry without any significant rashes. There were some scattered actinic and seborrheic keratoses. NEUROLOGIC:  Patient was generally weak, but there were no focal motor or sensory deficits. Speech was intact. No facial asymmetry. PSYCHIATRIC:  Her mood and affect were pleasant and cooperative. BACK:  There was no costovertebral angle tenderness noted. LABORATORY DATA:  Previously mentioned. ASSESSMENT AND PLAN:    1. Diarrhea, possibly secondary to viral or bacterial process. Stool for C difficile and PCR has been ordered.   Also, consider the possibility of lower GI bleed secondary to inflammatory bowel disease, polyps, AVMs, etc., or radiation enteritis as the patient has had radiation treatments in the past.  Will maintain n.p.o. status, IV fluids, serial hemoglobins. Await results of stool studies and GI evaluation. 2.   History of Garcia esophagus. Continue Protonix. 3.   Mild dehydration. Patient received IV fluids in the emergency room. Recheck blood work in the morning and follow urine output. 4.   Left hypodense kidney lesion. Ultrasound of the kidney is recommended on a nonurgent basis. 5.   History of biventricular heart failure. Currently, I believe the patient is somewhat dehydrated. Will hold diuretics for now and restart once her status stabilizes. Continue carvedilol as blood pressure allows. 6.   DVT prophylaxis. SCDs. Hold anticoagulation because of GI bleed. 7.   Chronic knee pain, left greater than right. Patient has required joint aspirations and injections in the past.  Will address her GI bleed. Once she is stable from that standpoint, consider physical therapy. Continue tramadol for pain as needed. Hold NSAIDs. 8.   The patient's current clinical status and proposed treatment plan were discussed with her. All of her questions were answered and she agreed with the  plan of care as outlined above.      Dictated By Bryce Araujo MD  d: 04/22/2023 08:39:19  t: 04/22/2023 09:36:21  Job [id number]  BQV/

## 2023-04-22 NOTE — PLAN OF CARE
Problem: Patient Centered Care  Goal: Patient preferences are identified and integrated in the patient's plan of care  Description: Interventions:  - What would you like us to know as we care for you?   - Provide timely, complete, and accurate information to patient/family  - Incorporate patient and family knowledge, values, beliefs, and cultural backgrounds into the planning and delivery of care  - Encourage patient/family to participate in care and decision-making at the level they choose  - Honor patient and family perspectives and choices  Outcome: Progressing     Problem: PAIN - ADULT  Goal: Verbalizes/displays adequate comfort level or patient's stated pain goal  Description: INTERVENTIONS:  - Encourage pt to monitor pain and request assistance  - Assess pain using appropriate pain scale  - Administer analgesics based on type and severity of pain and evaluate response  - Implement non-pharmacological measures as appropriate and evaluate response  - Consider cultural and social influences on pain and pain management  - Manage/alleviate anxiety  - Utilize distraction and/or relaxation techniques  - Monitor for opioid side effects  - Notify MD/LIP if interventions unsuccessful or patient reports new pain  - Anticipate increased pain with activity and pre-medicate as appropriate  Outcome: Progressing     Problem: SAFETY ADULT - FALL  Goal: Free from fall injury  Description: INTERVENTIONS:  - Assess pt frequently for physical needs  - Identify cognitive and physical deficits and behaviors that affect risk of falls.   - [place] fall precautions as indicated by assessment.  - Educate pt/family on patient safety including physical limitations  - Instruct pt to call for assistance with activity based on assessment  - Modify environment to reduce risk of injury  - Provide assistive devices as appropriate  - Consider OT/PT consult to assist with strengthening/mobility  - Encourage toileting schedule  Outcome: Progressing     Problem: RISK FOR INFECTION - ADULT  Goal: Absence of fever/infection during anticipated neutropenic period  Description: INTERVENTIONS  - Monitor WBC  - Administer growth factors as ordered  - Implement neutropenic guidelines  Outcome: Progressing     Problem: DISCHARGE PLANNING  Goal: Discharge to home or other facility with appropriate resources  Description: INTERVENTIONS:  - Identify barriers to discharge w/pt and caregiver  - Include patient/family/discharge partner in discharge planning  - Arrange for needed discharge resources and transportation as appropriate  - Identify discharge learning needs (meds, wound care, etc)  - Arrange for interpreters to assist at discharge as needed  - Consider post-discharge preferences of patient/family/discharge partner  - Complete POLST form as appropriate  - Assess patient's ability to be responsible for managing their own health  - Refer to Case Management Department for coordinating discharge planning if the patient needs post-hospital services based on physician/LIP order or complex needs related to functional status, cognitive ability or social support system  Outcome: Progressing     Problem: SKIN/TISSUE INTEGRITY - ADULT  Goal: Skin integrity remains intact  Description: INTERVENTIONS  - Assess and document risk factors for pressure ulcer development  - Assess and document skin integrity  - Monitor for areas of redness and/or skin breakdown  - Initiate interventions, skin care algorithm/standards of care as needed  Outcome: Progressing  Goal: Oral mucous membranes remain intact  Description: INTERVENTIONS  - Assess oral mucosa and hygiene practices  - Implement preventative oral hygiene regimen  - Implement oral medicated treatments as ordered  Outcome: Progressing     Pt alert and oriented. On bedrest. NPO per md. Remote tele ordered, currently no boxes available. Dr. Chika Tellez paged with STAT hemoglobin result, no new orders at this time.  Denies pain/discomfort. No nausea/vomiting/diarrhea since arriving to floor. Call light within reach. Bed in lowest and locked position. GI on consult to see patient.

## 2023-04-22 NOTE — PROGRESS NOTES
[facility] Pharmacy Note:  Renal Dose Adjustment for Tramadol Altaalice Jarvis    Charles Valentine has been prescribed Tramadol (ULTRAM) 50 mg orally every 8 hours as needed for pain. Estimated Creatinine Clearance: 23.5 mL/min (A) (based on SCr of 1.38 mg/dL (H)). Her calculated creatinine clearance is < 30 ml/min, therefore, the dose of Tramadol (ULTRAM) has been changed to 50 mg every 12 hours as needed for pain per P&T approved protocol. Pharmacy will continue to follow, and if renal function improves, will resume the original order.      Thank you,  Frank Teixeira, PharmD  4/22/2023 4:31 AM

## 2023-04-22 NOTE — PROGRESS NOTES
ADMISSION NOTE    80year old female with chronic diarrhea presents with abdominal pain and increased diarrhea. CT suggested enteritis. Patient was about to be dc from ED when she developed a bloody bm . Available medical records partially reviewed. Dictation to follow.     Jayce Jacobs M.D.  4/22/2023

## 2023-04-22 NOTE — PLAN OF CARE
Patient is alert and oriented x4. Monitoring vital signs- stable at this time. Receiving IV fluids per MD order. Trending hemoglobin levels. GI on consult, orders for GI stool panel and C. Diff in, but patient has only had small gelatinous, bloody clusters in the hat that is contaminated with urine. Tolerating clear liquid diet. Voiding freely in the bathroom. Tramadol and tylenol as needed for arthritis pain. Up with standby assist and a gait belt and walker. Encouraged frequent ambulation and movement. Fall precautions maintained- bed alarm and chair alarm on, bed locked in lowest position, call light and personal belongings within reach, non-skid socks in place to bilateral feet. Frequent rounding by nursing staff. GI on consult, plan pending results of tests and labs. Patient's daughter updated on plan of care. Confirmed patient is DNAR, daughter asked to provide paperwork.      Problem: Patient Centered Care  Goal: Patient preferences are identified and integrated in the patient's plan of care  Description: Interventions:  - What would you like us to know as we care for you?   - Provide timely, complete, and accurate information to patient/family  - Incorporate patient and family knowledge, values, beliefs, and cultural backgrounds into the planning and delivery of care  - Encourage patient/family to participate in care and decision-making at the level they choose  - Honor patient and family perspectives and choices  Outcome: Progressing     Problem: Patient/Family Goals  Goal: Patient/Family Long Term Goal  Description: Patient's Long Term Goal: to go home     Interventions:  - follow MD orders  - update patient and family on plan of care  - discharge planning  - GI consult  - diagnostics per order   - See additional Care Plan goals for specific interventions  Outcome: Progressing  Goal: Patient/Family Short Term Goal  Description: Patient's Short Term Goal: to have less pain    Interventions:   - pain medications as needed  - non-pharmacologic pain interventions  - PT/OT  - ambulate  - See additional Care Plan goals for specific interventions  Outcome: Progressing     Problem: PAIN - ADULT  Goal: Verbalizes/displays adequate comfort level or patient's stated pain goal  Description: INTERVENTIONS:  - Encourage pt to monitor pain and request assistance  - Assess pain using appropriate pain scale  - Administer analgesics based on type and severity of pain and evaluate response  - Implement non-pharmacological measures as appropriate and evaluate response  - Consider cultural and social influences on pain and pain management  - Manage/alleviate anxiety  - Utilize distraction and/or relaxation techniques  - Monitor for opioid side effects  - Notify MD/LIP if interventions unsuccessful or patient reports new pain  - Anticipate increased pain with activity and pre-medicate as appropriate  Outcome: Progressing     Problem: SAFETY ADULT - FALL  Goal: Free from fall injury  Description: INTERVENTIONS:  - Assess pt frequently for physical needs  - Identify cognitive and physical deficits and behaviors that affect risk of falls.   - [place] fall precautions as indicated by assessment.  - Educate pt/family on patient safety including physical limitations  - Instruct pt to call for assistance with activity based on assessment  - Modify environment to reduce risk of injury  - Provide assistive devices as appropriate  - Consider OT/PT consult to assist with strengthening/mobility  - Encourage toileting schedule  Outcome: Progressing     Problem: RISK FOR INFECTION - ADULT  Goal: Absence of fever/infection during anticipated neutropenic period  Description: INTERVENTIONS  - Monitor WBC  - Administer growth factors as ordered  - Implement neutropenic guidelines  Outcome: Progressing     Problem: DISCHARGE PLANNING  Goal: Discharge to home or other facility with appropriate resources  Description: INTERVENTIONS:  - Identify barriers to discharge w/pt and caregiver  - Include patient/family/discharge partner in discharge planning  - Arrange for needed discharge resources and transportation as appropriate  - Identify discharge learning needs (meds, wound care, etc)  - Arrange for interpreters to assist at discharge as needed  - Consider post-discharge preferences of patient/family/discharge partner  - Complete POLST form as appropriate  - Assess patient's ability to be responsible for managing their own health  - Refer to Case Management Department for coordinating discharge planning if the patient needs post-hospital services based on physician/LIP order or complex needs related to functional status, cognitive ability or social support system  Outcome: Progressing     Problem: SKIN/TISSUE INTEGRITY - ADULT  Goal: Skin integrity remains intact  Description: INTERVENTIONS  - Assess and document risk factors for pressure ulcer development  - Assess and document skin integrity  - Monitor for areas of redness and/or skin breakdown  - Initiate interventions, skin care algorithm/standards of care as needed  Outcome: Progressing  Goal: Oral mucous membranes remain intact  Description: INTERVENTIONS  - Assess oral mucosa and hygiene practices  - Implement preventative oral hygiene regimen  - Implement oral medicated treatments as ordered  Outcome: Progressing     Problem: HEMATOLOGIC - ADULT  Goal: Maintains hematologic stability  Description: INTERVENTIONS  - Assess for signs and symptoms of bleeding or hemorrhage  - Monitor labs and vital signs for trends  - Administer supportive blood products/factors, fluids and medications as ordered and appropriate  - Administer supportive blood products/factors as ordered and appropriate  Outcome: Progressing  Goal: Free from bleeding injury  Description: (Example usage: patient with low platelets)  INTERVENTIONS:  - Avoid intramuscular injections, enemas and rectal medication administration  - Ensure safe mobilization of patient  - Hold pressure on venipuncture sites to achieve adequate hemostasis  - Assess for signs and symptoms of internal bleeding  - Monitor lab trends  - Patient is to report abnormal signs of bleeding to staff  - Avoid use of toothpicks and dental floss  - Use electric shaver for shaving  - Use soft bristle tooth brush  - Limit straining and forceful nose blowing  Outcome: Progressing

## 2023-04-22 NOTE — DISCHARGE INSTRUCTIONS
Follow-up with your primary care physician next week. You should get a repeat chemistry panel to follow your kidney function in the next few days. [facility]  [address] [facility], [address] [place] [address]  Phone: [phone]  Fax: [phone]

## 2023-04-23 LAB
ANION GAP SERPL CALC-SCNC: 8 MMOL/L (ref 0–18)
BASOPHILS # BLD AUTO: 0.04 X10(3) UL (ref 0–0.2)
BASOPHILS NFR BLD AUTO: 0.3 %
BUN BLD-MCNC: 23 MG/DL (ref 7–18)
BUN/CREAT SERPL: 34.3 (ref 10–20)
C DIFF TOX B STL QL: NEGATIVE
CALCIUM BLD-MCNC: 8.3 MG/DL (ref 8.5–10.1)
CHLORIDE SERPL-SCNC: 113 MMOL/L (ref 98–112)
CO2 SERPL-SCNC: 22 MMOL/L (ref 21–32)
CREAT BLD-MCNC: 0.67 MG/DL
DEPRECATED RDW RBC AUTO: 50 FL (ref 35.1–46.3)
EOSINOPHIL # BLD AUTO: 0.09 X10(3) UL (ref 0–0.7)
EOSINOPHIL NFR BLD AUTO: 0.7 %
ERYTHROCYTE [DISTWIDTH] IN BLOOD BY AUTOMATED COUNT: 14.5 % (ref 11–15)
GFR SERPLBLD BASED ON 1.73 SQ M-ARVRAT: 83 ML/MIN/1.73M2 (ref 60–?)
GLUCOSE BLD-MCNC: 76 MG/DL (ref 70–99)
HCT VFR BLD AUTO: 26.5 %
HGB BLD-MCNC: 8.7 G/DL
IMM GRANULOCYTES # BLD AUTO: 0.09 X10(3) UL (ref 0–1)
IMM GRANULOCYTES NFR BLD: 0.7 %
LYMPHOCYTES # BLD AUTO: 0.87 X10(3) UL (ref 1–4)
LYMPHOCYTES NFR BLD AUTO: 7 %
MAGNESIUM SERPL-MCNC: 1.3 MG/DL (ref 1.6–2.6)
MCH RBC QN AUTO: 30.6 PG (ref 26–34)
MCHC RBC AUTO-ENTMCNC: 32.8 G/DL (ref 31–37)
MCV RBC AUTO: 93.3 FL
MONOCYTES # BLD AUTO: 0.95 X10(3) UL (ref 0.1–1)
MONOCYTES NFR BLD AUTO: 7.7 %
NEUTROPHILS # BLD AUTO: 10.34 X10 (3) UL (ref 1.5–7.7)
NEUTROPHILS # BLD AUTO: 10.34 X10(3) UL (ref 1.5–7.7)
NEUTROPHILS NFR BLD AUTO: 83.6 %
OSMOLALITY SERPL CALC.SUM OF ELEC: 298 MOSM/KG (ref 275–295)
PLATELET # BLD AUTO: 177 10(3)UL (ref 150–450)
POTASSIUM SERPL-SCNC: 3.6 MMOL/L (ref 3.5–5.1)
RBC # BLD AUTO: 2.84 X10(6)UL
SODIUM SERPL-SCNC: 143 MMOL/L (ref 136–145)
WBC # BLD AUTO: 12.4 X10(3) UL (ref 4–11)

## 2023-04-23 PROCEDURE — 99233 SBSQ HOSP IP/OBS HIGH 50: CPT | Performed by: INTERNAL MEDICINE

## 2023-04-23 RX ORDER — AMLODIPINE BESYLATE 10 MG/1
10 TABLET ORAL DAILY
Status: DISCONTINUED | OUTPATIENT
Start: 2023-04-23 | End: 2023-04-25

## 2023-04-23 RX ORDER — TORSEMIDE 5 MG/1
5 TABLET ORAL DAILY
Status: DISCONTINUED | OUTPATIENT
Start: 2023-04-23 | End: 2023-04-25

## 2023-04-23 RX ORDER — LISINOPRIL 20 MG/1
20 TABLET ORAL DAILY
Status: DISCONTINUED | OUTPATIENT
Start: 2023-04-23 | End: 2023-04-25

## 2023-04-23 RX ORDER — PANTOPRAZOLE SODIUM 40 MG/1
40 TABLET, DELAYED RELEASE ORAL
Status: DISCONTINUED | OUTPATIENT
Start: 2023-04-24 | End: 2023-04-25

## 2023-04-23 RX ORDER — SPIRONOLACTONE 25 MG/1
12.5 TABLET ORAL DAILY
Status: DISCONTINUED | OUTPATIENT
Start: 2023-04-23 | End: 2023-04-25

## 2023-04-23 NOTE — PLAN OF CARE
Pt A&Ox4. RA. Voiding with purewick. Denies pain. No BM yet, only small amounts of blood and urine, unable to receive stool sample. Clear liquid diet. IV fluids running. Up x1 assist w/ walker and gait belt. Discharge plan is [facility] pending med clear. Safety measures put into place and call light within reach.   Problem: Patient Centered Care  Goal: Patient preferences are identified and integrated in the patient's plan of care  Description: Interventions:  - What would you like us to know as we care for you?   - Provide timely, complete, and accurate information to patient/family  - Incorporate patient and family knowledge, values, beliefs, and cultural backgrounds into the planning and delivery of care  - Encourage patient/family to participate in care and decision-making at the level they choose  - Honor patient and family perspectives and choices  Outcome: Progressing     Problem: Patient/Family Goals  Goal: Patient/Family Long Term Goal  Description: Patient's Long Term Goal: to go home     Interventions:  - follow MD orders  - update patient and family on plan of care  - discharge planning  - GI consult  - diagnostics per order   - See additional Care Plan goals for specific interventions  Outcome: Progressing  Goal: Patient/Family Short Term Goal  Description: Patient's Short Term Goal: to have less pain    Interventions:   - pain medications as needed  - non-pharmacologic pain interventions  - PT/OT  - ambulate  - See additional Care Plan goals for specific interventions  Outcome: Progressing     Problem: PAIN - ADULT  Goal: Verbalizes/displays adequate comfort level or patient's stated pain goal  Description: INTERVENTIONS:  - Encourage pt to monitor pain and request assistance  - Assess pain using appropriate pain scale  - Administer analgesics based on type and severity of pain and evaluate response  - Implement non-pharmacological measures as appropriate and evaluate response  - Consider cultural and social influences on pain and pain management  - Manage/alleviate anxiety  - Utilize distraction and/or relaxation techniques  - Monitor for opioid side effects  - Notify MD/LIP if interventions unsuccessful or patient reports new pain  - Anticipate increased pain with activity and pre-medicate as appropriate  Outcome: Progressing     Problem: SAFETY ADULT - FALL  Goal: Free from fall injury  Description: INTERVENTIONS:  - Assess pt frequently for physical needs  - Identify cognitive and physical deficits and behaviors that affect risk of falls.   - [place] fall precautions as indicated by assessment.  - Educate pt/family on patient safety including physical limitations  - Instruct pt to call for assistance with activity based on assessment  - Modify environment to reduce risk of injury  - Provide assistive devices as appropriate  - Consider OT/PT consult to assist with strengthening/mobility  - Encourage toileting schedule  Outcome: Progressing     Problem: RISK FOR INFECTION - ADULT  Goal: Absence of fever/infection during anticipated neutropenic period  Description: INTERVENTIONS  - Monitor WBC  - Administer growth factors as ordered  - Implement neutropenic guidelines  Outcome: Progressing     Problem: DISCHARGE PLANNING  Goal: Discharge to home or other facility with appropriate resources  Description: INTERVENTIONS:  - Identify barriers to discharge w/pt and caregiver  - Include patient/family/discharge partner in discharge planning  - Arrange for needed discharge resources and transportation as appropriate  - Identify discharge learning needs (meds, wound care, etc)  - Arrange for interpreters to assist at discharge as needed  - Consider post-discharge preferences of patient/family/discharge partner  - Complete POLST form as appropriate  - Assess patient's ability to be responsible for managing their own health  - Refer to Case Management Department for coordinating discharge planning if the patient needs post-hospital services based on physician/LIP order or complex needs related to functional status, cognitive ability or social support system  Outcome: Progressing     Problem: SKIN/TISSUE INTEGRITY - ADULT  Goal: Skin integrity remains intact  Description: INTERVENTIONS  - Assess and document risk factors for pressure ulcer development  - Assess and document skin integrity  - Monitor for areas of redness and/or skin breakdown  - Initiate interventions, skin care algorithm/standards of care as needed  Outcome: Progressing  Goal: Oral mucous membranes remain intact  Description: INTERVENTIONS  - Assess oral mucosa and hygiene practices  - Implement preventative oral hygiene regimen  - Implement oral medicated treatments as ordered  Outcome: Progressing     Problem: HEMATOLOGIC - ADULT  Goal: Maintains hematologic stability  Description: INTERVENTIONS  - Assess for signs and symptoms of bleeding or hemorrhage  - Monitor labs and vital signs for trends  - Administer supportive blood products/factors, fluids and medications as ordered and appropriate  - Administer supportive blood products/factors as ordered and appropriate  Outcome: Progressing  Goal: Free from bleeding injury  Description: (Example usage: patient with low platelets)  INTERVENTIONS:  - Avoid intramuscular injections, enemas and rectal medication administration  - Ensure safe mobilization of patient  - Hold pressure on venipuncture sites to achieve adequate hemostasis  - Assess for signs and symptoms of internal bleeding  - Monitor lab trends  - Patient is to report abnormal signs of bleeding to staff  - Avoid use of toothpicks and dental floss  - Use electric shaver for shaving  - Use soft bristle tooth brush  - Limit straining and forceful nose blowing  Outcome: Progressing

## 2023-04-23 NOTE — CM/SW NOTE
SW followed up on DC planning. SW performed chart review - pt notified therapy that she has [facility] PT. Pt is current with [facility],[address] sent     PT recs pt returns home with [facility] as she has family who is available and able to assist at discharge     [facility]  [address] [facility], [address] [place] [address]  Phone: [phone]  Fax: [phone]    RICARDO received MDO for POLST. RICARDO placed POLST form on chart. MD to discuss w/ pt/family, fill out middle section of POLST form, and sign prior to SW/CTL/RN witnessing POLST form. PLAN: Home with [facility] - pending med ed Fontanez, LSW, MSW ext.  [phone]

## 2023-04-23 NOTE — PLAN OF CARE
Alert and oriented x 4. Up with one and a walker. Frequent ambulation encouraged. Receiving IV fluids per MD order. Voiding freely, passing gas with dark red clotted blood noted, scant stool. Stool sent for c diff and need larger sample to run GI panel. Teds for DVT prophylaxis. Tylenol as needed for pain. Vital signs monitored. No acute changes noted throughout shift. Tolerating clear liquid diet, advanced to full liquid. Fall precautions in place- bed in lowest position, call light and personal belongings within reach, non-skid socks in place. Frequent rounding by nursing staff. Magnesium covered per protocol. Plan is home pending medical clearance and diet tolerance. Problem: Patient Centered Care  Goal: Patient preferences are identified and integrated in the patient's plan of care  Description: Interventions:  - What would you like us to know as we care for you?  I live with my daughter and have a stair lift at home  - Provide timely, complete, and accurate information to patient/family  - Incorporate patient and family knowledge, values, beliefs, and cultural backgrounds into the planning and delivery of care  - Encourage patient/family to participate in care and decision-making at the level they choose  - Honor patient and family perspectives and choices  Outcome: Progressing     Problem: Patient/Family Goals  Goal: Patient/Family Long Term Goal  Description: Patient's Long Term Goal: to go home     Interventions:  - follow MD orders  - update patient and family on plan of care  - discharge planning  - GI consult  - diagnostics per order   - See additional Care Plan goals for specific interventions  Outcome: Progressing  Goal: Patient/Family Short Term Goal  Description: Patient's Short Term Goal: to have less pain    Interventions:   - pain medications as needed  - non-pharmacologic pain interventions  - PT/OT  - ambulate  - See additional Care Plan goals for specific interventions  Outcome: Progressing Problem: PAIN - ADULT  Goal: Verbalizes/displays adequate comfort level or patient's stated pain goal  Description: INTERVENTIONS:  - Encourage pt to monitor pain and request assistance  - Assess pain using appropriate pain scale  - Administer analgesics based on type and severity of pain and evaluate response  - Implement non-pharmacological measures as appropriate and evaluate response  - Consider cultural and social influences on pain and pain management  - Manage/alleviate anxiety  - Utilize distraction and/or relaxation techniques  - Monitor for opioid side effects  - Notify MD/LIP if interventions unsuccessful or patient reports new pain  - Anticipate increased pain with activity and pre-medicate as appropriate  Outcome: Progressing     Problem: SAFETY ADULT - FALL  Goal: Free from fall injury  Description: INTERVENTIONS:  - Assess pt frequently for physical needs  - Identify cognitive and physical deficits and behaviors that affect risk of falls.   - [place] fall precautions as indicated by assessment.  - Educate pt/family on patient safety including physical limitations  - Instruct pt to call for assistance with activity based on assessment  - Modify environment to reduce risk of injury  - Provide assistive devices as appropriate  - Consider OT/PT consult to assist with strengthening/mobility  - Encourage toileting schedule  Outcome: Progressing     Problem: RISK FOR INFECTION - ADULT  Goal: Absence of fever/infection during anticipated neutropenic period  Description: INTERVENTIONS  - Monitor WBC  - Administer growth factors as ordered  - Implement neutropenic guidelines  Outcome: Progressing     Problem: DISCHARGE PLANNING  Goal: Discharge to home or other facility with appropriate resources  Description: INTERVENTIONS:  - Identify barriers to discharge w/pt and caregiver  - Include patient/family/discharge partner in discharge planning  - Arrange for needed discharge resources and transportation as appropriate  - Identify discharge learning needs (meds, wound care, etc)  - Arrange for interpreters to assist at discharge as needed  - Consider post-discharge preferences of patient/family/discharge partner  - Complete POLST form as appropriate  - Assess patient's ability to be responsible for managing their own health  - Refer to Case Management Department for coordinating discharge planning if the patient needs post-hospital services based on physician/LIP order or complex needs related to functional status, cognitive ability or social support system  Outcome: Progressing     Problem: SKIN/TISSUE INTEGRITY - ADULT  Goal: Skin integrity remains intact  Description: INTERVENTIONS  - Assess and document risk factors for pressure ulcer development  - Assess and document skin integrity  - Monitor for areas of redness and/or skin breakdown  - Initiate interventions, skin care algorithm/standards of care as needed  Outcome: Progressing  Goal: Oral mucous membranes remain intact  Description: INTERVENTIONS  - Assess oral mucosa and hygiene practices  - Implement preventative oral hygiene regimen  - Implement oral medicated treatments as ordered  Outcome: Progressing     Problem: HEMATOLOGIC - ADULT  Goal: Maintains hematologic stability  Description: INTERVENTIONS  - Assess for signs and symptoms of bleeding or hemorrhage  - Monitor labs and vital signs for trends  - Administer supportive blood products/factors, fluids and medications as ordered and appropriate  - Administer supportive blood products/factors as ordered and appropriate  Outcome: Progressing  Goal: Free from bleeding injury  Description: (Example usage: patient with low platelets)  INTERVENTIONS:  - Avoid intramuscular injections, enemas and rectal medication administration  - Ensure safe mobilization of patient  - Hold pressure on venipuncture sites to achieve adequate hemostasis  - Assess for signs and symptoms of internal bleeding  - Monitor lab trends  - Patient is to report abnormal signs of bleeding to staff  - Avoid use of toothpicks and dental floss  - Use electric shaver for shaving  - Use soft bristle tooth brush  - Limit straining and forceful nose blowing  Outcome: Progressing

## 2023-04-24 LAB
ADENOVIRUS F 40/41 PCR: NEGATIVE
ASTROVIRUS PCR: NEGATIVE
BASOPHILS # BLD AUTO: 0.04 X10(3) UL (ref 0–0.2)
BASOPHILS NFR BLD AUTO: 0.3 %
C CAYETANENSIS DNA SPEC QL NAA+PROBE: NEGATIVE
CAMPY SP DNA.DIARRHEA STL QL NAA+PROBE: NEGATIVE
CRYPTOSP DNA SPEC QL NAA+PROBE: NEGATIVE
DEPRECATED RDW RBC AUTO: 51 FL (ref 35.1–46.3)
EAEC PAA PLAS AGGR+AATA ST NAA+NON-PRB: NEGATIVE
EC STX1+STX2 + H7 FLIC SPEC NAA+PROBE: NEGATIVE
ENTAMOEBA HISTOLYTICA PCR: NEGATIVE
EOSINOPHIL # BLD AUTO: 0.13 X10(3) UL (ref 0–0.7)
EOSINOPHIL NFR BLD AUTO: 1.1 %
EPEC EAE GENE STL QL NAA+NON-PROBE: NEGATIVE
ERYTHROCYTE [DISTWIDTH] IN BLOOD BY AUTOMATED COUNT: 14.6 % (ref 11–15)
ETEC LTA+ST1A+ST1B TOX ST NAA+NON-PROBE: NEGATIVE
GIARDIA LAMBLIA PCR: NEGATIVE
HCT VFR BLD AUTO: 26.2 %
HGB BLD-MCNC: 8.5 G/DL
IMM GRANULOCYTES # BLD AUTO: 0.09 X10(3) UL (ref 0–1)
IMM GRANULOCYTES NFR BLD: 0.7 %
LYMPHOCYTES # BLD AUTO: 0.88 X10(3) UL (ref 1–4)
LYMPHOCYTES NFR BLD AUTO: 7.2 %
MAGNESIUM SERPL-MCNC: 1.8 MG/DL (ref 1.6–2.6)
MCH RBC QN AUTO: 31.1 PG (ref 26–34)
MCHC RBC AUTO-ENTMCNC: 32.4 G/DL (ref 31–37)
MCV RBC AUTO: 96 FL
MONOCYTES # BLD AUTO: 1.05 X10(3) UL (ref 0.1–1)
MONOCYTES NFR BLD AUTO: 8.6 %
NEUTROPHILS # BLD AUTO: 10.02 X10 (3) UL (ref 1.5–7.7)
NEUTROPHILS # BLD AUTO: 10.02 X10(3) UL (ref 1.5–7.7)
NEUTROPHILS NFR BLD AUTO: 82.1 %
NOROVIRUS GI/GII PCR: NEGATIVE
P SHIGELLOIDES DNA STL QL NAA+PROBE: NEGATIVE
PLATELET # BLD AUTO: 182 10(3)UL (ref 150–450)
RBC # BLD AUTO: 2.73 X10(6)UL
ROTAVIRUS A PCR: NEGATIVE
SALMONELLA DNA SPEC QL NAA+PROBE: NEGATIVE
SAPOVIRUS PCR: NEGATIVE
SHIGELLA SP+EIEC IPAH ST NAA+NON-PROBE: NEGATIVE
V CHOLERAE DNA SPEC QL NAA+PROBE: NEGATIVE
VIBRIO DNA SPEC NAA+PROBE: NEGATIVE
WBC # BLD AUTO: 12.2 X10(3) UL (ref 4–11)
YERSINIA DNA SPEC NAA+PROBE: NEGATIVE

## 2023-04-24 PROCEDURE — 99233 SBSQ HOSP IP/OBS HIGH 50: CPT | Performed by: INTERNAL MEDICINE

## 2023-04-24 PROCEDURE — 99232 SBSQ HOSP IP/OBS MODERATE 35: CPT | Performed by: INTERNAL MEDICINE

## 2023-04-24 NOTE — PLAN OF CARE
Pt A&Ox4. RA. Teds for DVT prophylaxis. Diet advanced to soft per orders to advance as tolerated. Stool mixed with urine all shift so unable to send for sample. All MD's aware. Tylenol and tramadol for pain per MAR. Discharge plan is home pending med clear. Safety measures in place and call light within reach. Problem: Patient Centered Care  Goal: Patient preferences are identified and integrated in the patient's plan of care  Description: Interventions:  - What would you like us to know as we care for you?  I live with my daughter and have a stair lift at home  - Provide timely, complete, and accurate information to patient/family  - Incorporate patient and family knowledge, values, beliefs, and cultural backgrounds into the planning and delivery of care  - Encourage patient/family to participate in care and decision-making at the level they choose  - Honor patient and family perspectives and choices  Outcome: Progressing     Problem: Patient/Family Goals  Goal: Patient/Family Long Term Goal  Description: Patient's Long Term Goal: to go home     Interventions:  - follow MD orders  - update patient and family on plan of care  - discharge planning  - GI consult  - diagnostics per order   - See additional Care Plan goals for specific interventions  Outcome: Progressing  Goal: Patient/Family Short Term Goal  Description: Patient's Short Term Goal: to have less pain    Interventions:   - pain medications as needed  - non-pharmacologic pain interventions  - PT/OT  - ambulate  - See additional Care Plan goals for specific interventions  Outcome: Progressing     Problem: PAIN - ADULT  Goal: Verbalizes/displays adequate comfort level or patient's stated pain goal  Description: INTERVENTIONS:  - Encourage pt to monitor pain and request assistance  - Assess pain using appropriate pain scale  - Administer analgesics based on type and severity of pain and evaluate response  - Implement non-pharmacological measures as appropriate and evaluate response  - Consider cultural and social influences on pain and pain management  - Manage/alleviate anxiety  - Utilize distraction and/or relaxation techniques  - Monitor for opioid side effects  - Notify MD/LIP if interventions unsuccessful or patient reports new pain  - Anticipate increased pain with activity and pre-medicate as appropriate  Outcome: Progressing     Problem: SAFETY ADULT - FALL  Goal: Free from fall injury  Description: INTERVENTIONS:  - Assess pt frequently for physical needs  - Identify cognitive and physical deficits and behaviors that affect risk of falls.   - [place] fall precautions as indicated by assessment.  - Educate pt/family on patient safety including physical limitations  - Instruct pt to call for assistance with activity based on assessment  - Modify environment to reduce risk of injury  - Provide assistive devices as appropriate  - Consider OT/PT consult to assist with strengthening/mobility  - Encourage toileting schedule  Outcome: Progressing     Problem: RISK FOR INFECTION - ADULT  Goal: Absence of fever/infection during anticipated neutropenic period  Description: INTERVENTIONS  - Monitor WBC  - Administer growth factors as ordered  - Implement neutropenic guidelines  Outcome: Progressing

## 2023-04-24 NOTE — TELEPHONE ENCOUNTER
Will verify Marisabel dai office on 04/24/23 , no paperwork received at [facility] .

## 2023-04-24 NOTE — PHYSICAL THERAPY NOTE
PHYSICAL THERAPY TREATMENT NOTE - INPATIENT     Room Number: 305/555-N       Presenting Problem: Pt admitted w/ persistent diarrhea    Problem List  Principal Problem:    Diarrhea, unspecified type  Active Problems:    Anemia    Azotemia    Hyperglycemia    Abdominal pain of unknown etiology    Rectal bleeding      PHYSICAL THERAPY ASSESSMENT   Chart reviewed. JAMES Machado approved participation in physical therapy. PPE worn by therapist: mask, gloves and goggles. Patient was wearing a mask during session. Patient presented in bed with 6/10 pain. Patient with good  progress towards goals during this session. Education provided on Physical therapy plan of care and physiological benefits of out of bed mobility. Patient with good carryover. Bed mobility: Max assist  Transfers: Nt  Gait Assistance: Not tested                Pt seen daily  . Per RN request,PT RX limited to bed mobility,positioning for pt comfort as well as there ex to all 4s. Pt educated on deep breathing. Patient was left in bed at end of session with all needs in reach. The patient's Approx Degree of Impairment: 46.58% has been calculated based on documentation in the [facility] '6 clicks' Inpatient Basic Mobility Short Form. Research supports that patients with this level of impairment may benefit from Home with home health PT.  RN aware of patient status post session. DISCHARGE RECOMMENDATIONS  PT Discharge Recommendations: Home with home health PT     PLAN  PT Treatment Plan: Bed mobility; Body mechanics; Endurance;Gait training    SUBJECTIVE  Limited participation    OBJECTIVE  Precautions: Bed/chair alarm    WEIGHT BEARING RESTRICTION  Weight Bearing Restriction: None                PAIN ASSESSMENT   Ratin          BALANCE                                                                                                                                              ACTIVITY TOLERANCE                         O2 WALK       AM-PAC '6-Clicks' INPATIENT SHORT FORM - BASIC MOBILITY  How much difficulty does the patient currently have. .. Patient Difficulty: Turning over in bed (including adjusting bedclothes, sheets and blankets)?: A Little   Patient Difficulty: Sitting down on and standing up from a chair with arms (e.g., wheelchair, bedside commode, etc.): A Little   Patient Difficulty: Moving from lying on back to sitting on the side of the bed?: A Little   How much help from another person does the patient currently need. .. Help from Another: Moving to and from a bed to a chair (including a wheelchair)?: A Little   Help from Another: Need to walk in hospital room?: A Little   Help from Another: Climbing 3-5 steps with a railing?: A Little     AM-PAC Score:  Raw Score: 18   Approx Degree of Impairment: 46.58%   Standardized Score (AM-PAC Scale): 43.63   CMS Modifier (G-Code): CK      Additional information:     THERAPEUTIC EXERCISES  Lower Extremity Ankle pumps  Glut sets  Heel slides  Quad sets     Position Supine       Patient End of Session: In bed;Call light within reach;RN aware of session/findings;Bracing education provided per handout; All patient questions and concerns addressed    CURRENT GOALS       Patient Goal Patient's self-stated goal is: Home w/ dtr   Goal #1 Patient is able to demonstrate supine - sit EOB @ level: supervision     Goal #1   Current Status Max a   Goal #2 Patient is able to demonstrate transfers Sit to/from Stand at assistance level: supervision with walker - rolling     Goal #2  Current Status NT   Goal #3 Patient is able to ambulate 50 feet with assist device: walker - rolling at assistance level: supervision   Goal #3   Current Status NT   Goal #4 Patient will negotiate 1 stairs/one curb w/ assistive device and supervision   Goal #4   Current Status NT   Goal #5 Patient to demonstrate independence with home activity/exercise instructions provided to patient in preparation for discharge.    Goal #5   Current Status In progress   Goal #6    Goal #6  Current Status

## 2023-04-24 NOTE — PLAN OF CARE
Pt A&Ox4. RA. Teds for DVT prophylaxis. Voiding freely. No BM for stool sample. Full liquid diet, advanced as tolerated. Pain managed with tylenol and tramadol, pt resting in bed. IV fluids running. Discharge plan is home pending med clear. Safety measures put into place and call light within reach. Problem: Patient Centered Care  Goal: Patient preferences are identified and integrated in the patient's plan of care  Description: Interventions:  - What would you like us to know as we care for you?  I live with my daughter and have a stair lift at home  - Provide timely, complete, and accurate information to patient/family  - Incorporate patient and family knowledge, values, beliefs, and cultural backgrounds into the planning and delivery of care  - Encourage patient/family to participate in care and decision-making at the level they choose  - Honor patient and family perspectives and choices  Outcome: Progressing     Problem: Patient/Family Goals  Goal: Patient/Family Long Term Goal  Description: Patient's Long Term Goal: to go home     Interventions:  - follow MD orders  - update patient and family on plan of care  - discharge planning  - GI consult  - diagnostics per order   - See additional Care Plan goals for specific interventions  Outcome: Progressing  Goal: Patient/Family Short Term Goal  Description: Patient's Short Term Goal: to have less pain    Interventions:   - pain medications as needed  - non-pharmacologic pain interventions  - PT/OT  - ambulate  - See additional Care Plan goals for specific interventions  Outcome: Progressing     Problem: PAIN - ADULT  Goal: Verbalizes/displays adequate comfort level or patient's stated pain goal  Description: INTERVENTIONS:  - Encourage pt to monitor pain and request assistance  - Assess pain using appropriate pain scale  - Administer analgesics based on type and severity of pain and evaluate response  - Implement non-pharmacological measures as appropriate and evaluate response  - Consider cultural and social influences on pain and pain management  - Manage/alleviate anxiety  - Utilize distraction and/or relaxation techniques  - Monitor for opioid side effects  - Notify MD/LIP if interventions unsuccessful or patient reports new pain  - Anticipate increased pain with activity and pre-medicate as appropriate  Outcome: Progressing     Problem: SAFETY ADULT - FALL  Goal: Free from fall injury  Description: INTERVENTIONS:  - Assess pt frequently for physical needs  - Identify cognitive and physical deficits and behaviors that affect risk of falls.   - [place] fall precautions as indicated by assessment.  - Educate pt/family on patient safety including physical limitations  - Instruct pt to call for assistance with activity based on assessment  - Modify environment to reduce risk of injury  - Provide assistive devices as appropriate  - Consider OT/PT consult to assist with strengthening/mobility  - Encourage toileting schedule  Outcome: Progressing     Problem: RISK FOR INFECTION - ADULT  Goal: Absence of fever/infection during anticipated neutropenic period  Description: INTERVENTIONS  - Monitor WBC  - Administer growth factors as ordered  - Implement neutropenic guidelines  Outcome: Progressing     Problem: DISCHARGE PLANNING  Goal: Discharge to home or other facility with appropriate resources  Description: INTERVENTIONS:  - Identify barriers to discharge w/pt and caregiver  - Include patient/family/discharge partner in discharge planning  - Arrange for needed discharge resources and transportation as appropriate  - Identify discharge learning needs (meds, wound care, etc)  - Arrange for interpreters to assist at discharge as needed  - Consider post-discharge preferences of patient/family/discharge partner  - Complete POLST form as appropriate  - Assess patient's ability to be responsible for managing their own health  - Refer to Case Management Department for coordinating discharge planning if the patient needs post-hospital services based on physician/LIP order or complex needs related to functional status, cognitive ability or social support system  Outcome: Progressing     Problem: SKIN/TISSUE INTEGRITY - ADULT  Goal: Skin integrity remains intact  Description: INTERVENTIONS  - Assess and document risk factors for pressure ulcer development  - Assess and document skin integrity  - Monitor for areas of redness and/or skin breakdown  - Initiate interventions, skin care algorithm/standards of care as needed  Outcome: Progressing  Goal: Oral mucous membranes remain intact  Description: INTERVENTIONS  - Assess oral mucosa and hygiene practices  - Implement preventative oral hygiene regimen  - Implement oral medicated treatments as ordered  Outcome: Progressing     Problem: HEMATOLOGIC - ADULT  Goal: Maintains hematologic stability  Description: INTERVENTIONS  - Assess for signs and symptoms of bleeding or hemorrhage  - Monitor labs and vital signs for trends  - Administer supportive blood products/factors, fluids and medications as ordered and appropriate  - Administer supportive blood products/factors as ordered and appropriate  Outcome: Progressing  Goal: Free from bleeding injury  Description: (Example usage: patient with low platelets)  INTERVENTIONS:  - Avoid intramuscular injections, enemas and rectal medication administration  - Ensure safe mobilization of patient  - Hold pressure on venipuncture sites to achieve adequate hemostasis  - Assess for signs and symptoms of internal bleeding  - Monitor lab trends  - Patient is to report abnormal signs of bleeding to staff  - Avoid use of toothpicks and dental floss  - Use electric shaver for shaving  - Use soft bristle tooth brush  - Limit straining and forceful nose blowing  Outcome: Progressing

## 2023-04-25 VITALS
HEIGHT: 66 IN | BODY MASS INDEX: 19.44 KG/M2 | TEMPERATURE: 98 F | WEIGHT: 121 LBS | SYSTOLIC BLOOD PRESSURE: 144 MMHG | OXYGEN SATURATION: 97 % | DIASTOLIC BLOOD PRESSURE: 61 MMHG | HEART RATE: 65 BPM | RESPIRATION RATE: 18 BRPM

## 2023-04-25 PROCEDURE — 99239 HOSP IP/OBS DSCHRG MGMT >30: CPT | Performed by: INTERNAL MEDICINE

## 2023-04-25 RX ORDER — CARVEDILOL 12.5 MG/1
12.5 TABLET ORAL 2 TIMES DAILY WITH MEALS
Refills: 0 | Status: SHIPPED | COMMUNITY
Start: 2023-04-25

## 2023-04-25 NOTE — CM/SW NOTE
04/25/23 1000   Discharge disposition   Expected discharge disposition Home-Health   Post Acute Care Provider [address]  ([facility])   Discharge transportation Private car     MD hill order entered. [facility] notified via [facility] of dc. Plan: Home today with Edwin Diaz. Richa Mendoza.  Viola Bolton RN, BSN  Nurse   [phone]

## 2023-04-25 NOTE — PLAN OF CARE
Tolerating diet and denies abdominal pain. Voiding freely. Cleared for discharge. Problem: Patient Centered Care  Goal: Patient preferences are identified and integrated in the patient's plan of care  Description: Interventions:  - What would you like us to know as we care for you?  I live with my daughter and have a stair lift at home  - Provide timely, complete, and accurate information to patient/family  - Incorporate patient and family knowledge, values, beliefs, and cultural backgrounds into the planning and delivery of care  - Encourage patient/family to participate in care and decision-making at the level they choose  - Honor patient and family perspectives and choices  Outcome: Adequate for Discharge     Problem: Patient/Family Goals  Goal: Patient/Family Long Term Goal  Description: Patient's Long Term Goal: to go home     Interventions:  - follow MD orders  - update patient and family on plan of care  - discharge planning  - GI consult  - diagnostics per order   - See additional Care Plan goals for specific interventions  Outcome: Adequate for Discharge  Goal: Patient/Family Short Term Goal  Description: Patient's Short Term Goal: to have less pain    Interventions:   - pain medications as needed  - non-pharmacologic pain interventions  - PT/OT  - ambulate  - See additional Care Plan goals for specific interventions  Outcome: Adequate for Discharge     Problem: PAIN - ADULT  Goal: Verbalizes/displays adequate comfort level or patient's stated pain goal  Description: INTERVENTIONS:  - Encourage pt to monitor pain and request assistance  - Assess pain using appropriate pain scale  - Administer analgesics based on type and severity of pain and evaluate response  - Implement non-pharmacological measures as appropriate and evaluate response  - Consider cultural and social influences on pain and pain management  - Manage/alleviate anxiety  - Utilize distraction and/or relaxation techniques  - Monitor for opioid side effects  - Notify MD/LIP if interventions unsuccessful or patient reports new pain  - Anticipate increased pain with activity and pre-medicate as appropriate  Outcome: Adequate for Discharge     Problem: SAFETY ADULT - FALL  Goal: Free from fall injury  Description: INTERVENTIONS:  - Assess pt frequently for physical needs  - Identify cognitive and physical deficits and behaviors that affect risk of falls.   - [place] fall precautions as indicated by assessment.  - Educate pt/family on patient safety including physical limitations  - Instruct pt to call for assistance with activity based on assessment  - Modify environment to reduce risk of injury  - Provide assistive devices as appropriate  - Consider OT/PT consult to assist with strengthening/mobility  - Encourage toileting schedule  Outcome: Adequate for Discharge     Problem: RISK FOR INFECTION - ADULT  Goal: Absence of fever/infection during anticipated neutropenic period  Description: INTERVENTIONS  - Monitor WBC  - Administer growth factors as ordered  - Implement neutropenic guidelines  Outcome: Adequate for Discharge     Problem: DISCHARGE PLANNING  Goal: Discharge to home or other facility with appropriate resources  Description: INTERVENTIONS:  - Identify barriers to discharge w/pt and caregiver  - Include patient/family/discharge partner in discharge planning  - Arrange for needed discharge resources and transportation as appropriate  - Identify discharge learning needs (meds, wound care, etc)  - Arrange for interpreters to assist at discharge as needed  - Consider post-discharge preferences of patient/family/discharge partner  - Complete POLST form as appropriate  - Assess patient's ability to be responsible for managing their own health  - Refer to Case Management Department for coordinating discharge planning if the patient needs post-hospital services based on physician/LIP order or complex needs related to functional status, cognitive ability or social support system  Outcome: Adequate for Discharge     Problem: SKIN/TISSUE INTEGRITY - ADULT  Goal: Skin integrity remains intact  Description: INTERVENTIONS  - Assess and document risk factors for pressure ulcer development  - Assess and document skin integrity  - Monitor for areas of redness and/or skin breakdown  - Initiate interventions, skin care algorithm/standards of care as needed  Outcome: Adequate for Discharge  Goal: Oral mucous membranes remain intact  Description: INTERVENTIONS  - Assess oral mucosa and hygiene practices  - Implement preventative oral hygiene regimen  - Implement oral medicated treatments as ordered  Outcome: Adequate for Discharge     Problem: HEMATOLOGIC - ADULT  Goal: Maintains hematologic stability  Description: INTERVENTIONS  - Assess for signs and symptoms of bleeding or hemorrhage  - Monitor labs and vital signs for trends  - Administer supportive blood products/factors, fluids and medications as ordered and appropriate  - Administer supportive blood products/factors as ordered and appropriate  Outcome: Adequate for Discharge  Goal: Free from bleeding injury  Description: (Example usage: patient with low platelets)  INTERVENTIONS:  - Avoid intramuscular injections, enemas and rectal medication administration  - Ensure safe mobilization of patient  - Hold pressure on venipuncture sites to achieve adequate hemostasis  - Assess for signs and symptoms of internal bleeding  - Monitor lab trends  - Patient is to report abnormal signs of bleeding to staff  - Avoid use of toothpicks and dental floss  - Use electric shaver for shaving  - Use soft bristle tooth brush  - Limit straining and forceful nose blowing  Outcome: Adequate for Discharge

## 2023-04-25 NOTE — PLAN OF CARE
Patient is Aox4 on RA. Soft diet. 1 w/ walker. TEDs for DVT prophylaxis. No BM from patient overnight - unable to collect stool sample. Tylenol and Tramadol given for pain. Plan for home when medically clear. Problem: Patient Centered Care  Goal: Patient preferences are identified and integrated in the patient's plan of care  Description: Interventions:  - What would you like us to know as we care for you?  I live with my daughter and have a stair lift at home  - Provide timely, complete, and accurate information to patient/family  - Incorporate patient and family knowledge, values, beliefs, and cultural backgrounds into the planning and delivery of care  - Encourage patient/family to participate in care and decision-making at the level they choose  - Honor patient and family perspectives and choices  Outcome: Progressing     Problem: Patient/Family Goals  Goal: Patient/Family Long Term Goal  Description: Patient's Long Term Goal: to go home     Interventions:  - follow MD orders  - update patient and family on plan of care  - discharge planning  - GI consult  - diagnostics per order   - See additional Care Plan goals for specific interventions  Outcome: Progressing  Goal: Patient/Family Short Term Goal  Description: Patient's Short Term Goal: to have less pain    Interventions:   - pain medications as needed  - non-pharmacologic pain interventions  - PT/OT  - ambulate  - See additional Care Plan goals for specific interventions  Outcome: Progressing     Problem: PAIN - ADULT  Goal: Verbalizes/displays adequate comfort level or patient's stated pain goal  Description: INTERVENTIONS:  - Encourage pt to monitor pain and request assistance  - Assess pain using appropriate pain scale  - Administer analgesics based on type and severity of pain and evaluate response  - Implement non-pharmacological measures as appropriate and evaluate response  - Consider cultural and social influences on pain and pain management  - Manage/alleviate anxiety  - Utilize distraction and/or relaxation techniques  - Monitor for opioid side effects  - Notify MD/LIP if interventions unsuccessful or patient reports new pain  - Anticipate increased pain with activity and pre-medicate as appropriate  Outcome: Progressing     Problem: SAFETY ADULT - FALL  Goal: Free from fall injury  Description: INTERVENTIONS:  - Assess pt frequently for physical needs  - Identify cognitive and physical deficits and behaviors that affect risk of falls.   - [place] fall precautions as indicated by assessment.  - Educate pt/family on patient safety including physical limitations  - Instruct pt to call for assistance with activity based on assessment  - Modify environment to reduce risk of injury  - Provide assistive devices as appropriate  - Consider OT/PT consult to assist with strengthening/mobility  - Encourage toileting schedule  Outcome: Progressing     Problem: RISK FOR INFECTION - ADULT  Goal: Absence of fever/infection during anticipated neutropenic period  Description: INTERVENTIONS  - Monitor WBC  - Administer growth factors as ordered  - Implement neutropenic guidelines  Outcome: Progressing     Problem: DISCHARGE PLANNING  Goal: Discharge to home or other facility with appropriate resources  Description: INTERVENTIONS:  - Identify barriers to discharge w/pt and caregiver  - Include patient/family/discharge partner in discharge planning  - Arrange for needed discharge resources and transportation as appropriate  - Identify discharge learning needs (meds, wound care, etc)  - Arrange for interpreters to assist at discharge as needed  - Consider post-discharge preferences of patient/family/discharge partner  - Complete POLST form as appropriate  - Assess patient's ability to be responsible for managing their own health  - Refer to Case Management Department for coordinating discharge planning if the patient needs post-hospital services based on physician/LIP order or complex needs related to functional status, cognitive ability or social support system  Outcome: Progressing     Problem: SKIN/TISSUE INTEGRITY - ADULT  Goal: Skin integrity remains intact  Description: INTERVENTIONS  - Assess and document risk factors for pressure ulcer development  - Assess and document skin integrity  - Monitor for areas of redness and/or skin breakdown  - Initiate interventions, skin care algorithm/standards of care as needed  Outcome: Progressing  Goal: Oral mucous membranes remain intact  Description: INTERVENTIONS  - Assess oral mucosa and hygiene practices  - Implement preventative oral hygiene regimen  - Implement oral medicated treatments as ordered  Outcome: Progressing     Problem: HEMATOLOGIC - ADULT  Goal: Maintains hematologic stability  Description: INTERVENTIONS  - Assess for signs and symptoms of bleeding or hemorrhage  - Monitor labs and vital signs for trends  - Administer supportive blood products/factors, fluids and medications as ordered and appropriate  - Administer supportive blood products/factors as ordered and appropriate  Outcome: Progressing  Goal: Free from bleeding injury  Description: (Example usage: patient with low platelets)  INTERVENTIONS:  - Avoid intramuscular injections, enemas and rectal medication administration  - Ensure safe mobilization of patient  - Hold pressure on venipuncture sites to achieve adequate hemostasis  - Assess for signs and symptoms of internal bleeding  - Monitor lab trends  - Patient is to report abnormal signs of bleeding to staff  - Avoid use of toothpicks and dental floss  - Use electric shaver for shaving  - Use soft bristle tooth brush  - Limit straining and forceful nose blowing  Outcome: Progressing

## 2023-04-26 ENCOUNTER — PATIENT OUTREACH (OUTPATIENT)
Dept: CASE MANAGEMENT | Age: 88
End: 2023-04-26

## 2023-05-12 ENCOUNTER — OFFICE VISIT (OUTPATIENT)
Dept: PODIATRY CLINIC | Facility: CLINIC | Age: 88
End: 2023-05-12

## 2023-05-12 DIAGNOSIS — B35.1 ONYCHOMYCOSIS: Primary | ICD-10-CM

## 2023-05-12 DIAGNOSIS — E11.9 CONTROLLED TYPE 2 DIABETES MELLITUS WITHOUT COMPLICATION, WITHOUT LONG-TERM CURRENT USE OF INSULIN (HCC): ICD-10-CM

## 2023-05-12 DIAGNOSIS — M20.41 HAMMERTOES OF BOTH FEET: ICD-10-CM

## 2023-05-12 DIAGNOSIS — R26.81 GAIT INSTABILITY: ICD-10-CM

## 2023-05-12 DIAGNOSIS — M20.42 HAMMERTOES OF BOTH FEET: ICD-10-CM

## 2023-05-12 DIAGNOSIS — L84 FOOT CALLUS: ICD-10-CM

## 2023-05-12 PROCEDURE — 1126F AMNT PAIN NOTED NONE PRSNT: CPT | Performed by: PODIATRIST

## 2023-05-12 PROCEDURE — 99213 OFFICE O/P EST LOW 20 MIN: CPT | Performed by: PODIATRIST

## 2023-05-12 PROCEDURE — 1159F MED LIST DOCD IN RCRD: CPT | Performed by: PODIATRIST

## 2023-05-12 RX ORDER — TRAMADOL HYDROCHLORIDE 50 MG/1
TABLET ORAL
Qty: 30 TABLET | Refills: 0 | Status: SHIPPED | OUTPATIENT
Start: 2023-05-12

## 2023-05-12 NOTE — TELEPHONE ENCOUNTER
Please Review. Protocol Failed or has no protocol.        Requested Prescriptions   Pending Prescriptions Disp Refills    TRAMADOL 50 MG Oral Tab [Pharmacy Med Name: TRAMADOL 50MG TABLETS] 30 tablet 0     Sig: TAKE 1 TABLET(50 MG) BY MOUTH EVERY 8 HOURS AS NEEDED FOR PAIN       There is no refill protocol information for this order        Recent Outpatient Visits              Today Onychomycosis    [facility], [address], [place] Clarissa Calderón [place]    Office Visit    7 months ago Pain in toes of both feet    [facility], [address], [place] Violeta Galarza DPM    Office Visit    12 months ago Essential hypertension    Franko French MD    Office Visit    1 year ago Encounter for vaccination    Dede Aguilar    Nurse Only    1 year ago Essential hypertension    Franko French MD    Office Visit          Future Appointments         Provider Department Appt Notes    In 3 months Clarissa Calderón DPM [facility], [address], [place] 3 mo nail care

## 2023-05-26 NOTE — TELEPHONE ENCOUNTER
Patient following up on refill request for tramadol. Requesting 90 tablets, since 30 tablets does not last for 30 days. Rx pended.

## 2023-05-27 RX ORDER — TRAMADOL HYDROCHLORIDE 50 MG/1
50 TABLET ORAL EVERY 8 HOURS PRN
Qty: 90 TABLET | Refills: 0 | Status: SHIPPED | OUTPATIENT
Start: 2023-05-27

## 2023-07-05 NOTE — TELEPHONE ENCOUNTER
Please review. Protocol Failed or has no Protocol.     Requested Prescriptions   Pending Prescriptions Disp Refills    TRAMADOL 50 MG Oral Tab [Pharmacy Med Name: TRAMADOL 50MG TABLETS] 30 tablet 0     Sig: TAKE 1 TABLET(50 MG) BY MOUTH EVERY 8 HOURS AS NEEDED FOR PAIN       There is no refill protocol information for this order

## 2023-07-05 NOTE — TELEPHONE ENCOUNTER
Non protocol medication. Patient called stating \"I really need it for my knee. \"  Please advise on refill request.

## 2023-07-06 RX ORDER — TRAMADOL HYDROCHLORIDE 50 MG/1
50 TABLET ORAL EVERY 8 HOURS PRN
Qty: 30 TABLET | Refills: 0 | Status: SHIPPED | OUTPATIENT
Start: 2023-07-06

## 2023-07-13 ENCOUNTER — MED REC SCAN ONLY (OUTPATIENT)
Dept: INTERNAL MEDICINE CLINIC | Facility: CLINIC | Age: 88
End: 2023-07-13

## 2023-07-23 RX ORDER — TRAMADOL HYDROCHLORIDE 50 MG/1
50 TABLET ORAL EVERY 8 HOURS PRN
Qty: 30 TABLET | Refills: 0 | Status: SHIPPED | OUTPATIENT
Start: 2023-07-23

## 2023-07-23 NOTE — TELEPHONE ENCOUNTER
Please review. Protocol Failed or has No Protocol.     Requested Prescriptions   Pending Prescriptions Disp Refills    TRAMADOL 50 MG Oral Tab [Pharmacy Med Name: TRAMADOL 50MG TABLETS] 30 tablet 0     Sig: TAKE 1 TABLET(50 MG) BY MOUTH EVERY 8 HOURS AS NEEDED FOR PAIN       There is no refill protocol information for this order          Future Appointments         Provider Department Appt Notes    In 1 week Danelle He MD [address], [address] [facility] SUPER last 4/25/8211, policy informed    In 3 weeks Tashi Escamilla DPM [facility], [address], [place] 3 mo nail care            Recent Outpatient Visits              2 months ago Onychomycosis    Kathy PresidentGretta Dardanelle, [place]    Office Visit    10 months ago Pain in toes of both feet    [place] Ahuja DPM    Office Visit    1 year ago Essential hypertension    Dicie Eisenmenger, MD    Office Visit    1 year ago Encounter for vaccination    Fermin Butterfield, [facility] Lori Briggs    Nurse Only    2 years ago Essential hypertension    Veronica Xavier MD    Office Visit

## 2023-08-10 RX ORDER — OMEPRAZOLE 40 MG/1
40 CAPSULE, DELAYED RELEASE ORAL DAILY
Qty: 90 CAPSULE | Refills: 3 | Status: SHIPPED | OUTPATIENT
Start: 2023-08-10

## 2023-08-10 RX ORDER — TRAMADOL HYDROCHLORIDE 50 MG/1
50 TABLET ORAL EVERY 8 HOURS PRN
Qty: 30 TABLET | Refills: 0 | Status: SHIPPED | OUTPATIENT
Start: 2023-08-10

## 2023-08-10 NOTE — TELEPHONE ENCOUNTER
Refill passed per [place] [employer] [place], [facility] protocol. Please advise on tramadol.    Requested Prescriptions   Pending Prescriptions Disp Refills    TRAMADOL 50 MG Oral Tab [Pharmacy Med Name: TRAMADOL 50MG TABLETS] 30 tablet 0     Sig: TAKE 1 TABLET(50 MG) BY MOUTH EVERY 8 HOURS AS NEEDED FOR PAIN       There is no refill protocol information for this order       OMEPRAZOLE 40 MG Oral Capsule Delayed Release [Pharmacy Med Name: OMEPRAZOLE 40MG CAPSULES] 90 capsule 1     Sig: TAKE 1 CAPSULE(40 MG) BY MOUTH DAILY       Gastrointestional Medication Protocol Passed - 8/9/2023  1:59 PM        Passed - In person appointment or virtual visit in the past 12 mos or appointment in next 3 mos     Recent Outpatient Visits              3 months ago Onychomycosis    [facility], [address] [facility] [place]    Office Visit    10 months ago Pain in toes of both feet    [address] [place] [address] Anjali Wu DPM    Office Visit    1 year ago Essential hypertension    Chani Maharaj MD    Office Visit    1 year ago Encounter for vaccination    Dede Aguilar    Nurse Only    2 years ago Essential hypertension    Carloyn AlyrCorrie MD    Office Visit          Future Appointments         Provider Department Appt Notes    In 4 days Leslie Vail DPM [facility], [address] [facility], [place] 3 mo nail care    In 1 month Timm Netters, MD [address], [place] [address] last 5/92/6121, policy informed                    @[email or web address]      [unfilled]

## 2023-08-14 ENCOUNTER — OFFICE VISIT (OUTPATIENT)
Dept: PODIATRY CLINIC | Facility: CLINIC | Age: 88
End: 2023-08-14

## 2023-08-14 DIAGNOSIS — L84 FOOT CALLUS: ICD-10-CM

## 2023-08-14 DIAGNOSIS — M20.42 HAMMERTOES OF BOTH FEET: ICD-10-CM

## 2023-08-14 DIAGNOSIS — E11.9 CONTROLLED TYPE 2 DIABETES MELLITUS WITHOUT COMPLICATION, WITHOUT LONG-TERM CURRENT USE OF INSULIN (HCC): ICD-10-CM

## 2023-08-14 DIAGNOSIS — M20.41 HAMMERTOES OF BOTH FEET: ICD-10-CM

## 2023-08-14 DIAGNOSIS — R26.81 GAIT INSTABILITY: ICD-10-CM

## 2023-08-14 DIAGNOSIS — B35.1 ONYCHOMYCOSIS: Primary | ICD-10-CM

## 2023-08-14 PROCEDURE — 1159F MED LIST DOCD IN RCRD: CPT | Performed by: PODIATRIST

## 2023-08-14 PROCEDURE — 99213 OFFICE O/P EST LOW 20 MIN: CPT | Performed by: PODIATRIST

## 2023-08-14 PROCEDURE — 1126F AMNT PAIN NOTED NONE PRSNT: CPT | Performed by: PODIATRIST

## 2023-08-14 NOTE — PROGRESS NOTES
[address] [facility] Podiatry  Progress Note    Vida Martínez is a 80year old female. Patient presents with:  Diabetic Foot Care: F/u and callus Left 2nd toe- pain 6/10 when bumping into things. Hg1C 6.3 check on 2/24/23. HPI:     This is a pleasant diabetic female with CHF, renal insufficiency. She does use a walker for ambulation. She presents to clinic today for diabetic foot care. Allergies: Patient has no known allergies. Current Outpatient Medications   Medication Sig Dispense Refill    traMADol 50 MG Oral Tab Take 1 tablet (50 mg total) by mouth every 8 (eight) hours as needed for Pain. 30 tablet 0    Omeprazole 40 MG Oral Capsule Delayed Release Take 1 capsule (40 mg total) by mouth daily. 90 capsule 3    amLODIPine 10 MG Oral Tab Take 1 tablet (10 mg total) by mouth daily. 90 tablet 0    carvedilol 12.5 MG Oral Tab Take 1 tablet (12.5 mg total) by mouth 2 (two) times daily with meals. HOLD if blood pressure is under 120 or heart rate under 60  0    lisinopril 20 MG Oral Tab Take 1 tablet (20 mg total) by mouth daily. 30 tablet 2    torsemide 10 MG Oral Tab Take 0.5 tablets (5 mg total) by mouth daily. 30 tablet 2    spironolactone 25 MG Oral Tab Take 0.5 tablets (12.5 mg total) by mouth daily. 30 tablet 2    acetaminophen 500 MG Oral Tab Take 2 tablets (1,000 mg total) by mouth 2 (two) times daily as needed for Pain. NON FORMULARY Take 1 tablet by mouth daily. Nervive -for peripheral neuropathy      Loperamide HCl 2 MG Oral Cap Take 1 capsule (2 mg total) by mouth 4 (four) times daily as needed. Multiple Vitamins-Minerals (MULTI VITAMIN/MINERALS) Oral Tab Take by mouth daily.         Past Medical History:   Diagnosis Date    B12 deficiency     on monthly shots - she gives herself    Back problem     Garcia esophagus 1998    Garcia's esophagus determined by endoscopy     Chronic pain of both shoulders 10/13/2017    Chronic systolic heart failure (HCC)     Degenerative disc disease, lumbar     Diabetes ([facility]     diet controlled    Essential hypertension     GERD (gastroesophageal reflux disease)     Hiatal hernia     Hyperlipidemia     Iron deficiency anemia     Mitral valve regurgitation     Osteoarthritis     Peripheral neuropathy     hands and feet    Spinal stenosis     Uterine cancer ([facility]     hyst with bso and s/p radiation      Past Surgical History:   Procedure Laterality Date    APPENDECTOMY  1954    BIOPSY OF STOMACH,BY LAPAROTOMY  ,     CATARACT      bilat    COLONOSCOPY  2014    normal per pt. need records. per pt no more needed due to age also egd then but needs them intermittently. HYSTERECTOMY  1995    total    KNEE ARTHROSCOPY  1997    torn cartilage    OTHER SURGICAL HISTORY      ileum resected bc of radiation damage    RADIATION THERAPY MANAGEMENT        Family History   Problem Relation Age of Onset    Heart Disorder Father         mi at 59    Hypertension Mother         cva at 80      Social History    Socioeconomic History      Marital status:     Tobacco Use      Smoking status: Former        Packs/day: 0.20        Years: 3.00        Pack years: .6        Types: Cigarettes        Quit date: 1967        Years since quittin.6      Smokeless tobacco: Never    Vaping Use      Vaping Use: Never used    Substance and Sexual Activity      Alcohol use: Yes        Alcohol/week: 1.0 standard drink of alcohol        Types: 1 Glasses of wine per week        Comment: wine with dinner      Drug use: No    Other Topics      Concerns:        Caffeine Concern: Yes          1 cup coffee daily        Exercise: No          REVIEW OF SYSTEMS:   Denies nausea, fever, chills  No calf pain  No other muscle or joint aches  Denies chest pain or shortness of breath. EXAM:   There were no vitals taken for this visit. Constitutional:   Patient in no apparent distress. Well kept. Of normal body habitus.  Alert and oriented to person, place, and time. Vascular Examination:  Pedal pulses are NP  Capillary refill is adequate  Edema is present bilateral ankles pitting  Varicosities present bilateral  Integumentary Examination:   The patient's nails appear incurvated, thickened, elongated, dystrophic, discolored with subungual debris 1-5 right, 1-5  left nails. Digital hair growth is absent  Skin is of diminished texture and decreased turgor. Callus right sub 5th met head, pre ulcerative callus left distal 3rd toe    Neurological Examination:  Monofilament (10-g) sensation is 5/5 to right and 5/5 to left. Sharp/dull is present to right and is present to left. Parasthesias absent. Musculoskeletal Examination:  Muscle Strength is 5/5. Hammer digit deformity present digits 2-5  bilateral.          LABS & IMAGING:     Lab Results   Component Value Date    GLU 76 04/23/2023    BUN 23 (H) 04/23/2023    CREATSERUM 0.67 04/23/2023    BUNCREA 34.3 (H) 04/23/2023    ANIONGAP 8 04/23/2023    GFRAA 70 04/20/2022    GFRNAA 61 04/20/2022    CA 8.3 (L) 04/23/2023     04/23/2023    K 3.6 04/23/2023     (H) 04/23/2023    CO2 22.0 04/23/2023    OSMOCALC 298 (H) 04/23/2023        Lab Results   Component Value Date     (H) 02/24/2023    A1C 6.3 (H) 02/24/2023        No results found. ASSESSMENT AND PLAN:   Diagnoses and all orders for this visit:    Onychomycosis    Foot callus    Gait instability    Hammertoes of both feet    Controlled type 2 diabetes mellitus without complication, without long-term current use of insulin (HCC)          Plan:     Diabetic education and instructions have been provided. We reviewed and discussed the following:    -risk categories related to pts with diabetes and foot or lower extremity complications per ADA.     -adherence to medication regimen and close monitoring or blood sugar control.   -daily monitoring/inspection of feet and shoes.   -proper management of diet and weight   -regular follow up with PCP and specialty providers as recommended   -Lower extremity complications related to DM were reviewed and stressed prevention. Evaluated the patient. Discussed treatment options with the patient. Discussed with patient proper care and hygiene for their feet. Patient tolerated procedures well, without incident. Instrumentation used includes nail nippers and electric  where appropriate. Procedure: (15675 Debridement of toenails 6-10) Surgically debrided and mechanically reduced 6 or more toenails. Hemmorhage occurred none. Nails that were debrided appeared dystrophic and caused the patient pain in shoe gear. Nails 5 Left & 5 Right. Evaluated patient. Discussed treatment options with patient. Discussed proper hygiene and care for feet as well as use of emollient creams (ie Urea based creams)  Answered all patient questions. Discussed offloading hyperkeratotic lesions with proper shoe gear, offloading pads, and insoles. Procedures: (CPT 18 Paring or cutting of benign hyperkeratotic lesion 2-4)  2 lesions pared utilizing #15 blade to b/l foot without incident. Fitted and dispensed crest pad left 3rd toe due to pre ulcerative callus, pt to wear during the day and remove at bedtime. RTC 9 weeks for [facility], will also ensure left distal 3rd toe pre ulcerative callus is stable    No follow-ups on file.     Mandeep Cody DPM  8/14/23

## 2023-08-16 ENCOUNTER — TELEPHONE (OUTPATIENT)
Dept: ORTHOPEDICS CLINIC | Facility: CLINIC | Age: 88
End: 2023-08-16

## 2023-08-16 DIAGNOSIS — M79.622 LEFT UPPER ARM PAIN: Primary | ICD-10-CM

## 2023-08-23 NOTE — TELEPHONE ENCOUNTER
Please review refill protocol failed/ no protocol  Requested Prescriptions   Pending Prescriptions Disp Refills    TRAMADOL 50 MG Oral Tab [Pharmacy Med Name: TRAMADOL 50MG TABLETS] 30 tablet 0     Sig: TAKE 1 TABLET(50 MG) BY MOUTH EVERY 8 HOURS AS NEEDED FOR PAIN       There is no refill protocol information for this order

## 2023-08-24 RX ORDER — AMLODIPINE BESYLATE 10 MG/1
10 TABLET ORAL DAILY
Qty: 90 TABLET | Refills: 3 | Status: SHIPPED | OUTPATIENT
Start: 2023-08-24

## 2023-08-24 RX ORDER — TRAMADOL HYDROCHLORIDE 50 MG/1
50 TABLET ORAL EVERY 8 HOURS PRN
Qty: 30 TABLET | Refills: 0 | Status: SHIPPED | OUTPATIENT
Start: 2023-08-24

## 2023-08-24 NOTE — TELEPHONE ENCOUNTER
Please review; protocol failed. Please see message below for upcoming appointment.     Future Appointments   Date Time Provider Napoleon Souza                 9/12/2023  2:00 PM Prosper Mcfarland MD [facility]              Requested Prescriptions   Pending Prescriptions Disp Refills    AMLODIPINE 10 MG Oral Tab [Pharmacy Med Name: AMLODIPINE BESYLATE 10MG TABLETS] 90 tablet 0     Sig: TAKE 1 TABLET(10 MG) BY MOUTH DAILY       Hypertensive Medications Protocol Failed - 8/24/2023  5:42 AM        Failed - Last BP reading less than 140/90     BP Readings from Last 1 Encounters:  04/25/23 : 144/61              Failed - In person appointment or virtual visit in the past 6 months     Recent Outpatient Visits              1 week ago Onychomycosis    [address] [facility] [place], [place]    Office Visit    3 months ago Onychomycosis    [address] [facility] [place] Calder[place], [place]    Office Visit    11 months ago Pain in toes of both feet    [address] [facility] [place] Ronda Stevens DPM    Office Visit    1 year ago Essential hypertension    Chris Prado MD    Office Visit    1 year ago Encounter for vaccination    [facility], [address] [facility]    Nurse Only          Future Appointments         Provider Department Appt Notes    In 2 weeks [facility] XR [address]     In 2 weeks MD Hank [address]ras [address] left bicep pain    In 2 weeks Prosper Mcfarland MD [address] [facility],[address] [facility], [address] last 3/25/0088, policy informed    In 1 month Brooklyn Castro DPM [facility], [address], [place] [address] - In person appointment in the past 12 or next 3 months     Recent Outpatient Visits              1 week ago Onychomycosis    [facility], [address], [place], [place]    Office Visit    3 months ago Onychomycosis    [facility], [address], [place], [place]    Office Visit    11 months ago Pain in toes of both feet    Deryl President, [place] Hodan Mora DPM    Office Visit    1 year ago Essential hypertension    Dicie Eisenmenger, MD    Office Visit    1 year ago Encounter for vaccination    Gray Aguilar    Nurse Only          Future Appointments         Provider Department Appt Notes    In 2 weeks 1 [facility]     In 2 weeks Jennifer Mckenzie MD [address], [place] left bicep pain    In 2 weeks Danelle He MD [facility], 148 [facility] [address]  last 2/95/7723, policy informed    In 1 month Tashi Escamilla DPM [facility], [address], [address] or BMP in past 6 months     Recent Results (from the past 4392 hour(s))   Basic Metabolic Panel (8)    Collection Time: 04/23/23  4:39 AM   Result Value Ref Range    Glucose 76 70 - 99 mg/dL    Sodium 143 136 - 145 mmol/L    Potassium 3.6 3.5 - 5.1 mmol/L    Chloride 113 (H) 98 - 112 mmol/L    CO2 22.0 21.0 - 32.0 mmol/L    Anion Gap 8 0 - 18 mmol/L    BUN 23 (H) 7 - 18 mg/dL    Creatinine 0.67 0.55 - 1.02 mg/dL    BUN/CREA Ratio 34.3 (H) 10.0 - 20.0    Calcium, Total 8.3 (L) 8.5 - 10.1 mg/dL    Calculated Osmolality 298 (H) 275 - 295 mOsm/kg    eGFR-Cr 83 >=60 mL/min/1.73m2     *Note: Due to a large number of results and/or encounters for the requested time period, some results have not been displayed. A complete set of results can be found in Results Review.                Passed - EGFRCR or GFRNAA > 50     GFR Evaluation  EGFRCR: 83 , resulted on 4/23/2023             Recent Outpatient Visits              1 week ago Onychomycosis    [address] [facility], [place], [place]    Office Visit    3 months ago Onychomycosis    [facility], [address], [facility], [place], [place]    Office Visit    11 months ago Pain in toes of both feet    [address] [facility] Juana Maynard DPM    Office Visit    1 year ago Essential hypertension    Sonia Chapin MD    Office Visit    1 year ago Encounter for vaccination    Gray Aguilar    Nurse Only          Future Appointments         Provider Department Appt Notes    In 2 weeks [facility] XR [address] [facility] [address]     In 2 weeks MD [facility] left bicep pain    In 2 weeks Jigar Acosta MD [address] [facility],[address], [address] last 6/35/6050, policy informed    In 1 month [facility], [address], [place] [address]

## 2023-08-25 ENCOUNTER — TELEPHONE (OUTPATIENT)
Dept: INTERNAL MEDICINE CLINIC | Facility: CLINIC | Age: 88
End: 2023-08-25

## 2023-08-25 DIAGNOSIS — M79.602 ARM PAIN, LEFT: Primary | ICD-10-CM

## 2023-09-05 RX ORDER — TRAMADOL HYDROCHLORIDE 50 MG/1
50 TABLET ORAL EVERY 8 HOURS PRN
Qty: 30 TABLET | Refills: 0 | OUTPATIENT
Start: 2023-09-05

## 2023-09-05 RX ORDER — TRAMADOL HYDROCHLORIDE 50 MG/1
50 TABLET ORAL EVERY 8 HOURS PRN
Qty: 30 TABLET | Refills: 0 | Status: SHIPPED | OUTPATIENT
Start: 2023-09-05

## 2023-09-05 NOTE — TELEPHONE ENCOUNTER
Please review; protocol failed.    Requested Prescriptions   Pending Prescriptions Disp Refills    TRAMADOL 50 MG Oral Tab [Pharmacy Med Name: TRAMADOL 50MG TABLETS] 30 tablet 0     Sig: TAKE 1 TABLET(50 MG) BY MOUTH EVERY 8 HOURS AS NEEDED FOR PAIN       There is no refill protocol information for this order        Recent Outpatient Visits              3 weeks ago Onychomycosis    [facility], [address], [place], [place]    Office Visit    3 months ago Onychomycosis    [facility], [address], [place], [place]    Office Visit    11 months ago Pain in toes of both feet    [facility], [address], [place] Stephen Abdalla DPM    Office Visit    1 year ago Essential hypertension    Ingrid La MD    Office Visit    1 year ago Encounter for vaccination    Dede Aguilar    Nurse Only          Future Appointments         Provider Department Appt Notes    Tomorrow [employer] X[facility] [address] Adrian Kerns MD [facility], [address] [place] [address]; LT BICEP PAIN    In 1 week Hannah Mckeon MD [facility], [address] [facility] 143 [place] SUPER last 8/96/9378, policy informed    In 1 month Cruz Garduno DPM [facility], [address], [place] [address]

## 2023-09-06 ENCOUNTER — HOSPITAL ENCOUNTER (OUTPATIENT)
Dept: GENERAL RADIOLOGY | Age: 88
Discharge: HOME | End: 2023-09-06
Attending: ORTHOPAEDIC SURGERY
Payer: MEDICARE

## 2023-09-06 ENCOUNTER — OFFICE VISIT (OUTPATIENT)
Dept: ORTHOPEDICS CLINIC | Facility: CLINIC | Age: 88
End: 2023-09-06
Payer: MEDICARE

## 2023-09-06 VITALS — BODY MASS INDEX: 19.44 KG/M2 | WEIGHT: 121 LBS | HEIGHT: 66 IN

## 2023-09-06 DIAGNOSIS — M19.012 PRIMARY OSTEOARTHRITIS OF LEFT SHOULDER: ICD-10-CM

## 2023-09-06 DIAGNOSIS — S50.01XA CONTUSION OF RIGHT ELBOW, INITIAL ENCOUNTER: Primary | ICD-10-CM

## 2023-09-06 DIAGNOSIS — M79.622 LEFT UPPER ARM PAIN: ICD-10-CM

## 2023-09-06 PROCEDURE — 73060 X-RAY EXAM OF HUMERUS: CPT | Performed by: ORTHOPAEDIC SURGERY

## 2023-09-06 PROCEDURE — 99204 OFFICE O/P NEW MOD 45 MIN: CPT | Performed by: ORTHOPAEDIC SURGERY

## 2023-09-06 PROCEDURE — 1159F MED LIST DOCD IN RCRD: CPT | Performed by: ORTHOPAEDIC SURGERY

## 2023-09-06 PROCEDURE — 1160F RVW MEDS BY RX/DR IN RCRD: CPT | Performed by: ORTHOPAEDIC SURGERY

## 2023-09-06 PROCEDURE — 3008F BODY MASS INDEX DOCD: CPT | Performed by: ORTHOPAEDIC SURGERY

## 2023-09-06 PROCEDURE — 1125F AMNT PAIN NOTED PAIN PRSNT: CPT | Performed by: ORTHOPAEDIC SURGERY

## 2023-09-12 ENCOUNTER — LAB ENCOUNTER (OUTPATIENT)
Dept: LAB | Age: 88
End: 2023-09-12
Attending: INTERNAL MEDICINE
Payer: MEDICARE

## 2023-09-12 ENCOUNTER — OFFICE VISIT (OUTPATIENT)
Dept: INTERNAL MEDICINE CLINIC | Facility: CLINIC | Age: 88
End: 2023-09-12

## 2023-09-12 VITALS
HEIGHT: 66 IN | BODY MASS INDEX: 20 KG/M2 | SYSTOLIC BLOOD PRESSURE: 147 MMHG | HEART RATE: 76 BPM | DIASTOLIC BLOOD PRESSURE: 66 MMHG

## 2023-09-12 DIAGNOSIS — H91.90 HEARING DIFFICULTY, UNSPECIFIED LATERALITY: ICD-10-CM

## 2023-09-12 DIAGNOSIS — I10 ESSENTIAL HYPERTENSION: ICD-10-CM

## 2023-09-12 DIAGNOSIS — D64.9 ANEMIA, UNSPECIFIED TYPE: ICD-10-CM

## 2023-09-12 DIAGNOSIS — I50.9 CHRONIC CONGESTIVE HEART FAILURE, UNSPECIFIED HEART FAILURE TYPE (HCC): ICD-10-CM

## 2023-09-12 DIAGNOSIS — Z00.00 MEDICARE ANNUAL WELLNESS VISIT, SUBSEQUENT: Primary | ICD-10-CM

## 2023-09-12 DIAGNOSIS — E53.8 VITAMIN B 12 DEFICIENCY: ICD-10-CM

## 2023-09-12 DIAGNOSIS — I34.0 NONRHEUMATIC MITRAL VALVE REGURGITATION: ICD-10-CM

## 2023-09-12 DIAGNOSIS — E11.9 CONTROLLED TYPE 2 DIABETES MELLITUS WITHOUT COMPLICATION, WITHOUT LONG-TERM CURRENT USE OF INSULIN (HCC): ICD-10-CM

## 2023-09-12 DIAGNOSIS — E55.9 VITAMIN D DEFICIENCY: ICD-10-CM

## 2023-09-12 DIAGNOSIS — E78.5 HYPERLIPIDEMIA, UNSPECIFIED HYPERLIPIDEMIA TYPE: ICD-10-CM

## 2023-09-12 DIAGNOSIS — Z00.00 ENCOUNTER FOR ANNUAL HEALTH EXAMINATION: ICD-10-CM

## 2023-09-12 LAB
ALBUMIN SERPL-MCNC: 3.5 G/DL (ref 3.4–5)
ALBUMIN/GLOB SERPL: 1.1 {RATIO} (ref 1–2)
ALP LIVER SERPL-CCNC: 82 U/L
ALT SERPL-CCNC: 19 U/L
ANION GAP SERPL CALC-SCNC: 8 MMOL/L (ref 0–18)
AST SERPL-CCNC: 15 U/L (ref 15–37)
BASOPHILS # BLD AUTO: 0.02 X10(3) UL (ref 0–0.2)
BASOPHILS NFR BLD AUTO: 0.4 %
BILIRUB SERPL-MCNC: 0.6 MG/DL (ref 0.1–2)
BUN BLD-MCNC: 27 MG/DL (ref 7–18)
BUN/CREAT SERPL: 27.8 (ref 10–20)
CALCIUM BLD-MCNC: 9 MG/DL (ref 8.5–10.1)
CHLORIDE SERPL-SCNC: 110 MMOL/L (ref 98–112)
CO2 SERPL-SCNC: 25 MMOL/L (ref 21–32)
CREAT BLD-MCNC: 0.97 MG/DL
DEPRECATED RDW RBC AUTO: 47 FL (ref 35.1–46.3)
EGFRCR SERPLBLD CKD-EPI 2021: 56 ML/MIN/1.73M2 (ref 60–?)
EOSINOPHIL # BLD AUTO: 0.06 X10(3) UL (ref 0–0.7)
EOSINOPHIL NFR BLD AUTO: 1.1 %
ERYTHROCYTE [DISTWIDTH] IN BLOOD BY AUTOMATED COUNT: 13.1 % (ref 11–15)
FASTING STATUS PATIENT QL REPORTED: NO
FOLATE SERPL-MCNC: >20 NG/ML (ref 8.7–?)
GLOBULIN PLAS-MCNC: 3.2 G/DL (ref 2.8–4.4)
GLUCOSE BLD-MCNC: 120 MG/DL (ref 70–99)
HCT VFR BLD AUTO: 29.7 %
HGB BLD-MCNC: 9.9 G/DL
IMM GRANULOCYTES # BLD AUTO: 0.01 X10(3) UL (ref 0–1)
IMM GRANULOCYTES NFR BLD: 0.2 %
IRON SATN MFR SERPL: 18 %
IRON SERPL-MCNC: 52 UG/DL
LYMPHOCYTES # BLD AUTO: 0.92 X10(3) UL (ref 1–4)
LYMPHOCYTES NFR BLD AUTO: 16.5 %
MCH RBC QN AUTO: 32.9 PG (ref 26–34)
MCHC RBC AUTO-ENTMCNC: 33.3 G/DL (ref 31–37)
MCV RBC AUTO: 98.7 FL
MONOCYTES # BLD AUTO: 0.41 X10(3) UL (ref 0.1–1)
MONOCYTES NFR BLD AUTO: 7.4 %
NEUTROPHILS # BLD AUTO: 4.14 X10 (3) UL (ref 1.5–7.7)
NEUTROPHILS # BLD AUTO: 4.14 X10(3) UL (ref 1.5–7.7)
NEUTROPHILS NFR BLD AUTO: 74.4 %
OSMOLALITY SERPL CALC.SUM OF ELEC: 302 MOSM/KG (ref 275–295)
PLATELET # BLD AUTO: 230 10(3)UL (ref 150–450)
POTASSIUM SERPL-SCNC: 4.5 MMOL/L (ref 3.5–5.1)
PROT SERPL-MCNC: 6.7 G/DL (ref 6.4–8.2)
RBC # BLD AUTO: 3.01 X10(6)UL
SODIUM SERPL-SCNC: 143 MMOL/L (ref 136–145)
T4 FREE SERPL-MCNC: 1 NG/DL (ref 0.8–1.7)
TIBC SERPL-MCNC: 297 UG/DL (ref 240–450)
TRANSFERRIN SERPL-MCNC: 199 MG/DL (ref 200–360)
TSI SER-ACNC: 1.59 MIU/ML (ref 0.36–3.74)
VIT B12 SERPL-MCNC: 123 PG/ML (ref 193–986)
VIT D+METAB SERPL-MCNC: 30.9 NG/ML (ref 30–100)
WBC # BLD AUTO: 5.6 X10(3) UL (ref 4–11)

## 2023-09-12 PROCEDURE — 3078F DIAST BP <80 MM HG: CPT | Performed by: INTERNAL MEDICINE

## 2023-09-12 PROCEDURE — 1125F AMNT PAIN NOTED PAIN PRSNT: CPT | Performed by: INTERNAL MEDICINE

## 2023-09-12 PROCEDURE — 80053 COMPREHEN METABOLIC PANEL: CPT

## 2023-09-12 PROCEDURE — G0439 PPPS, SUBSEQ VISIT: HCPCS | Performed by: INTERNAL MEDICINE

## 2023-09-12 PROCEDURE — 3008F BODY MASS INDEX DOCD: CPT | Performed by: INTERNAL MEDICINE

## 2023-09-12 PROCEDURE — 84466 ASSAY OF TRANSFERRIN: CPT

## 2023-09-12 PROCEDURE — 85025 COMPLETE CBC W/AUTO DIFF WBC: CPT

## 2023-09-12 PROCEDURE — 3077F SYST BP >= 140 MM HG: CPT | Performed by: INTERNAL MEDICINE

## 2023-09-12 PROCEDURE — 82607 VITAMIN B-12: CPT

## 2023-09-12 PROCEDURE — 99213 OFFICE O/P EST LOW 20 MIN: CPT | Performed by: INTERNAL MEDICINE

## 2023-09-12 PROCEDURE — 1170F FXNL STATUS ASSESSED: CPT | Performed by: INTERNAL MEDICINE

## 2023-09-12 PROCEDURE — 82746 ASSAY OF FOLIC ACID SERUM: CPT

## 2023-09-12 PROCEDURE — 83540 ASSAY OF IRON: CPT

## 2023-09-12 PROCEDURE — 1159F MED LIST DOCD IN RCRD: CPT | Performed by: INTERNAL MEDICINE

## 2023-09-12 PROCEDURE — 96160 PT-FOCUSED HLTH RISK ASSMT: CPT | Performed by: INTERNAL MEDICINE

## 2023-09-12 PROCEDURE — 84439 ASSAY OF FREE THYROXINE: CPT

## 2023-09-12 PROCEDURE — 90677 PCV20 VACCINE IM: CPT | Performed by: INTERNAL MEDICINE

## 2023-09-12 PROCEDURE — 84443 ASSAY THYROID STIM HORMONE: CPT

## 2023-09-12 PROCEDURE — 82306 VITAMIN D 25 HYDROXY: CPT

## 2023-09-12 PROCEDURE — G0009 ADMIN PNEUMOCOCCAL VACCINE: HCPCS | Performed by: INTERNAL MEDICINE

## 2023-09-12 PROCEDURE — 36415 COLL VENOUS BLD VENIPUNCTURE: CPT

## 2023-09-18 ENCOUNTER — TELEPHONE (OUTPATIENT)
Dept: INTERNAL MEDICINE CLINIC | Facility: CLINIC | Age: 88
End: 2023-09-18

## 2023-09-18 NOTE — TELEPHONE ENCOUNTER
CSS=please call and assists for B 12 injection, RN appointment,thanks. SEE LABS 9/12/23;      Christian Hoffman, RN  9/16/2023 10:05 AM CDT Back to Top      ALICIA Estrada Yesterday  Order placed for B12 injections. Stephie Rodriguez, JAMES  3/60/2908  2:15 PM CDT       Per my last documentation, patient's daughter would like to be called when the B 12 shot is ordered at [phone] so she can then schedule.          Future Appointments   Date Time Provider Napoleon Souza   10/16/2023  2:00 PM James Calderón DPM [facility]

## 2023-09-19 NOTE — TELEPHONE ENCOUNTER
Encounter closed.        Future Appointments   Date Time Provider Napoleon Libby   9/26/2023  3:00 PM [facility] IM RN ESTER Patten   10/16/2023  2:00 PM Carola Calderón DPM [facility]

## 2023-09-21 PROBLEM — D64.9 ANEMIA: Status: RESOLVED | Noted: 2023-04-21 | Resolved: 2023-09-21

## 2023-09-21 PROBLEM — K62.5 RECTAL BLEEDING: Status: RESOLVED | Noted: 2023-04-22 | Resolved: 2023-09-21

## 2023-09-21 PROBLEM — N28.9 RENAL INSUFFICIENCY: Status: RESOLVED | Noted: 2019-02-12 | Resolved: 2023-09-21

## 2023-09-21 PROBLEM — R73.9 HYPERGLYCEMIA: Status: RESOLVED | Noted: 2023-04-21 | Resolved: 2023-09-21

## 2023-09-21 PROBLEM — J96.01 ACUTE RESPIRATORY FAILURE WITH HYPOXIA (HCC): Status: RESOLVED | Noted: 2023-02-24 | Resolved: 2023-09-21

## 2023-09-21 PROBLEM — I24.9 ACUTE CORONARY SYNDROME (HCC): Status: RESOLVED | Noted: 2023-02-24 | Resolved: 2023-09-21

## 2023-09-21 PROBLEM — R10.9 ABDOMINAL PAIN OF UNKNOWN ETIOLOGY: Status: RESOLVED | Noted: 2023-04-22 | Resolved: 2023-09-21

## 2023-09-21 PROBLEM — R19.7 DIARRHEA, UNSPECIFIED TYPE: Status: RESOLVED | Noted: 2023-04-21 | Resolved: 2023-09-21

## 2023-09-21 PROBLEM — E46 PROTEIN-CALORIE MALNUTRITION, UNSPECIFIED SEVERITY (HCC): Status: ACTIVE | Noted: 2023-09-21

## 2023-09-21 PROBLEM — R79.89 AZOTEMIA: Status: RESOLVED | Noted: 2023-04-21 | Resolved: 2023-09-21

## 2023-09-21 PROBLEM — R06.89 RESPIRATORY INSUFFICIENCY: Status: RESOLVED | Noted: 2022-04-18 | Resolved: 2023-09-21

## 2023-09-21 PROBLEM — I50.43 ACUTE ON CHRONIC COMBINED SYSTOLIC AND DIASTOLIC CONGESTIVE HEART FAILURE (HCC): Status: RESOLVED | Noted: 2022-04-18 | Resolved: 2023-09-21

## 2023-09-26 ENCOUNTER — NURSE ONLY (OUTPATIENT)
Dept: INTERNAL MEDICINE CLINIC | Facility: CLINIC | Age: 88
End: 2023-09-26

## 2023-09-26 DIAGNOSIS — E53.8 VITAMIN B 12 DEFICIENCY: Primary | ICD-10-CM

## 2023-09-26 PROCEDURE — 96372 THER/PROPH/DIAG INJ SC/IM: CPT | Performed by: NURSE PRACTITIONER

## 2023-09-26 RX ADMIN — CYANOCOBALAMIN 1000 MCG: 1000 INJECTION, SOLUTION INTRAMUSCULAR; SUBCUTANEOUS at 16:25:00

## 2023-10-16 RX ORDER — TRAMADOL HYDROCHLORIDE 50 MG/1
50 TABLET ORAL EVERY 8 HOURS PRN
Qty: 30 TABLET | Refills: 0 | Status: SHIPPED | OUTPATIENT
Start: 2023-10-16

## 2023-10-23 ENCOUNTER — OFFICE VISIT (OUTPATIENT)
Dept: PODIATRY CLINIC | Facility: CLINIC | Age: 88
End: 2023-10-23

## 2023-10-23 DIAGNOSIS — L97.522 DIABETIC ULCER OF TOE OF LEFT FOOT ASSOCIATED WITH TYPE 2 DIABETES MELLITUS, WITH FAT LAYER EXPOSED (HCC): Primary | ICD-10-CM

## 2023-10-23 DIAGNOSIS — R26.81 GAIT INSTABILITY: ICD-10-CM

## 2023-10-23 DIAGNOSIS — M20.42 HAMMERTOES OF BOTH FEET: ICD-10-CM

## 2023-10-23 DIAGNOSIS — L84 FOOT CALLUS: ICD-10-CM

## 2023-10-23 DIAGNOSIS — M20.41 HAMMERTOES OF BOTH FEET: ICD-10-CM

## 2023-10-23 DIAGNOSIS — E11.621 DIABETIC ULCER OF TOE OF LEFT FOOT ASSOCIATED WITH TYPE 2 DIABETES MELLITUS, WITH FAT LAYER EXPOSED (HCC): Primary | ICD-10-CM

## 2023-10-23 DIAGNOSIS — E11.9 CONTROLLED TYPE 2 DIABETES MELLITUS WITHOUT COMPLICATION, WITHOUT LONG-TERM CURRENT USE OF INSULIN (HCC): ICD-10-CM

## 2023-10-23 NOTE — PROGRESS NOTES
[facility], [facility] Podiatry  Progress Note    Parris Caballero is a 80year old female. Patient presents with:  Diabetic Foot Care: Here w/ daughter - Nail trim and foot check- also here for L 3rd digit callus check- denies any pain at this time- FBS this AM was not taken        HPI:     This is a pleasant diabetic female with CHF, renal insufficiency. She does use a walker for ambulation. She presents to clinic today with her daughter for diabetic foot care. She is also here to evaluate the left 3rd toe callus. She denies any drainage and is wearing the crest pad. Allergies: Patient has no known allergies. Current Outpatient Medications   Medication Sig Dispense Refill    traMADol 50 MG Oral Tab Take 1 tablet (50 mg total) by mouth every 8 (eight) hours as needed for Pain. 30 tablet 0    amLODIPine 10 MG Oral Tab Take 1 tablet (10 mg total) by mouth daily. 90 tablet 3    Omeprazole 40 MG Oral Capsule Delayed Release Take 1 capsule (40 mg total) by mouth daily. 90 capsule 3    carvedilol 12.5 MG Oral Tab Take 1 tablet (12.5 mg total) by mouth 2 (two) times daily with meals. HOLD if blood pressure is under 120 or heart rate under 60  0    lisinopril 20 MG Oral Tab Take 1 tablet (20 mg total) by mouth daily. 30 tablet 2    torsemide 10 MG Oral Tab Take 0.5 tablets (5 mg total) by mouth daily. 30 tablet 2    spironolactone 25 MG Oral Tab Take 0.5 tablets (12.5 mg total) by mouth daily. 30 tablet 2    acetaminophen 500 MG Oral Tab Take 2 tablets (1,000 mg total) by mouth 2 (two) times daily as needed for Pain. NON FORMULARY Take 1 tablet by mouth daily. Nervive -for peripheral neuropathy      Loperamide HCl 2 MG Oral Cap Take 1 capsule (2 mg total) by mouth 4 (four) times daily as needed. Multiple Vitamins-Minerals (MULTI VITAMIN/MINERALS) Oral Tab Take by mouth daily.         Past Medical History:   Diagnosis Date    B12 deficiency     on monthly shots - she gives herself    Back problem Garcia esophagus     Gracia's esophagus determined by endoscopy     Chronic pain of both shoulders 10/13/2017    Chronic systolic heart failure (HCC)     Degenerative disc disease, lumbar     Diabetes ([address]     diet controlled    Essential hypertension     GERD (gastroesophageal reflux disease)     Hiatal hernia     Hyperlipidemia     Iron deficiency anemia     Mitral valve regurgitation     Osteoarthritis     Peripheral neuropathy     hands and feet    Spinal stenosis     Uterine cancer ([facility]     hyst with bso and s/p radiation      Past Surgical History:   Procedure Laterality Date    APPENDECTOMY  1954    BIOPSY OF STOMACH,BY LAPAROTOMY  ,     CATARACT      bilat    COLONOSCOPY  2014    normal per pt. need records. per pt no more needed due to age also egd then but needs them intermittently. HYSTERECTOMY  1995    total    KNEE ARTHROSCOPY  1997    torn cartilage    OTHER SURGICAL HISTORY      ileum resected bc of radiation damage    RADIATION THERAPY MANAGEMENT        Family History   Problem Relation Age of Onset    Heart Disorder Father         mi at 59    Hypertension Mother         cva at 80      Social History    Socioeconomic History      Marital status:     Tobacco Use      Smoking status: Former        Packs/day: 0.20        Years: 3.00        Additional pack years: 0.00        Total pack years: 0.60        Types: Cigarettes        Quit date: 1967        Years since quittin.8        Passive exposure: Past      Smokeless tobacco: Never    Vaping Use      Vaping Use: Never used    Substance and Sexual Activity      Alcohol use:  Yes        Alcohol/week: 1.0 standard drink of alcohol        Types: 1 Glasses of wine per week        Comment: wine with dinner      Drug use: No    Other Topics      Concerns:        Caffeine Concern: Yes          1 cup coffee daily        Exercise: No          REVIEW OF SYSTEMS:   Denies nausea, fever, chills  No calf pain  No other muscle or joint aches  Denies chest pain or shortness of breath. EXAM:   There were no vitals taken for this visit. Constitutional:   Patient in no apparent distress. Well kept. Of normal body habitus. Alert and oriented to person, place, and time. Vascular Examination:  Pedal pulses are NP  Capillary refill is adequate  Edema is present bilateral ankles pitting  Varicosities present bilateral  Integumentary Examination:   The patient's nails appear incurvated, thickened, elongated, dystrophic, discolored with subungual debris 1-5 right, 1-5  left nails. Digital hair growth is absent  Skin is of diminished texture and decreased turgor. Callus right sub 5th met head, pre ulcerative callus left distal 3rd toe    Ulceration:     Etiology of ulceration:  Diabetic        Location & Measurements of each wound L x W x D in cm (before debridement if performed)     Wound A location: Left distal 3rd toe Length: 0.3cm x Width: 0.2cm x Depth: 0.3 cm       Description of the wound: fibrotic  Description of exudate: none  no evidence of infection   no evidence of undermining   no evidence of tunneling   yes evidence of reduced circulation  If evidence of infection is present document treatment/response:      Required for Non-Pressure Ulcers-Depth of Ulcer (each wound)  Limited to breakdown of skin:   Subcutaneous tissue exposed: yes  Muscle/Tendon Exposed without necrosis:  with necrosis:   Bone exposed without necrosis:  with necrosis:      Neurological Examination:  Monofilament (10-g) sensation is 5/5 to right and 5/5 to left. Sharp/dull is present to right and is present to left. Parasthesias absent. Musculoskeletal Examination:  Muscle Strength is 5/5.   Hammer digit deformity present digits 2-5  bilateral.          LABS & IMAGING:     Lab Results   Component Value Date     (H) 09/12/2023    BUN 27 (H) 09/12/2023    CREATSERUM 0.97 09/12/2023    BUNCREA 27.8 (H) 09/12/2023 ANIONGAP 8 09/12/2023    GFRAA 70 04/20/2022    GFRNAA 61 04/20/2022    CA 9.0 09/12/2023     09/12/2023    K 4.5 09/12/2023     09/12/2023    CO2 25.0 09/12/2023    OSMOCALC 302 (H) 09/12/2023        Lab Results   Component Value Date     (H) 02/24/2023    A1C 6.3 (H) 02/24/2023        No results found. ASSESSMENT AND PLAN:   Diagnoses and all orders for this visit:    Diabetic ulcer of toe of left foot associated with type 2 diabetes mellitus, with fat layer exposed ([address]    Foot callus    Hammertoes of both feet    Gait instability    Controlled type 2 diabetes mellitus without complication, without long-term current use of insulin ([address]            Plan:     Discussed in detail the etiology of ulceration. Discussed the importance of good hygiene and following conservative care instructions. Discussed the potential for infection and other risks, including osteomyelitis, if non-compliant. Patient verbalized understanding. Discussed the potential for loss of limb/life. Pt instructed on signs and symptoms of infection to watch for including N/F/V/C/SOB/CP, redness, increased drainage, malodor, etc. If any concern patient is to contact our office immediately or go to the Emergency Department. Pt acknowledge understanding. Procedure Note: Debridement   11042-Debridement, subcutaneous tissue (includes epidermis and dermis, if performed); first 20 square cm or less. (See overall size of wound to calculate debridement size)  Sharp excisional debridement of wound was performed to the level of and including subcutaneous tissue with #15 blade, dermal curette, or moistened gauze as listed below. Pt tolerated debridement well. Granular tissue/wound base was achieved with healthy bleeding noted. Bleeding was controlled with manual pressure and dry, sterile gauze. Non-viable tissue removed from wound bed with debridement.     Instrumentation Used:  dermal currette  Type of tissue removed: fibrotic, non-viable  Deepest Tissue Excised: Sub-cutaneous  Wound A: Yes  Wound B:  No  Wound C:  No   Was the removal of viable tissue evidenced by active bleeding? Yes   Percentage of wound requiring debridement (if entire wound is larger than 20 sq cm)  Wound A: 100 % Wound B: _ % Wound C: _ %     Measurements of each wound after debridement (if wound is enlarged): Same as measurements above. 2nd-9th Visit debridement   Status of wound since last visit:   Patient compliance with support relief/off-loading:        Expected duration of skilled wound care therapy: 1-3 months  Goal of therapy: complete healing  Good prognosis  Expected frequency of subsequent procedures: 1-2 weeks      Immediate post procedure topical wound care and follow-up instructions including offloading if applicable: hydrofera blue and bandaid, pt daughter to change daily as above         Fitted and dispensed post op shoe left foot, pt to be WBAT with post op shoe. Pt to cont wearing crest pad left 3rd toe, pt to wear during the day and remove at bedtime. RTC 2-3 weeks for left 3rd toe wound f/u. If no improvement will get xrays and order arterial US    No follow-ups on file.     Adrián Nichole DPM  10/23/23

## 2023-11-07 ENCOUNTER — OFFICE VISIT (OUTPATIENT)
Dept: PODIATRY CLINIC | Facility: CLINIC | Age: 88
End: 2023-11-07

## 2023-11-07 DIAGNOSIS — L84 FOOT CALLUS: ICD-10-CM

## 2023-11-07 DIAGNOSIS — R26.81 GAIT INSTABILITY: ICD-10-CM

## 2023-11-07 DIAGNOSIS — M20.41 HAMMERTOES OF BOTH FEET: ICD-10-CM

## 2023-11-07 DIAGNOSIS — M20.42 HAMMERTOES OF BOTH FEET: ICD-10-CM

## 2023-11-07 DIAGNOSIS — L97.522 DIABETIC ULCER OF TOE OF LEFT FOOT ASSOCIATED WITH TYPE 2 DIABETES MELLITUS, WITH FAT LAYER EXPOSED (HCC): Primary | ICD-10-CM

## 2023-11-07 DIAGNOSIS — E11.621 DIABETIC ULCER OF TOE OF LEFT FOOT ASSOCIATED WITH TYPE 2 DIABETES MELLITUS, WITH FAT LAYER EXPOSED (HCC): Primary | ICD-10-CM

## 2023-11-07 DIAGNOSIS — E11.9 CONTROLLED TYPE 2 DIABETES MELLITUS WITHOUT COMPLICATION, WITHOUT LONG-TERM CURRENT USE OF INSULIN (HCC): ICD-10-CM

## 2023-11-07 PROCEDURE — 1126F AMNT PAIN NOTED NONE PRSNT: CPT | Performed by: PODIATRIST

## 2023-11-07 PROCEDURE — 99213 OFFICE O/P EST LOW 20 MIN: CPT | Performed by: PODIATRIST

## 2023-11-07 PROCEDURE — 1159F MED LIST DOCD IN RCRD: CPT | Performed by: PODIATRIST

## 2023-11-07 NOTE — PROGRESS NOTES
[address] [facility] Podiatry  Progress Note    Simon Herrera is a 80year old female. Chief Complaint   Patient presents with    Diabetic Ulcer     Here w/ daughter- Left 3rd toe wound f/u- denies any pain at this time- pt is pre-diabetic- don't check BS daily         HPI:     This is a pleasant diabetic female with CHF, renal insufficiency. She does use a walker for ambulation. She presents to clinic today with her daughter for left 3rd toe wound care. She has been wearing the post op shoe and changing the dressings daily. She states it is no longer painful. Allergies: Patient has no known allergies. Current Outpatient Medications   Medication Sig Dispense Refill    traMADol 50 MG Oral Tab Take 1 tablet (50 mg total) by mouth every 8 (eight) hours as needed for Pain. 30 tablet 0    amLODIPine 10 MG Oral Tab Take 1 tablet (10 mg total) by mouth daily. 90 tablet 3    Omeprazole 40 MG Oral Capsule Delayed Release Take 1 capsule (40 mg total) by mouth daily. 90 capsule 3    carvedilol 12.5 MG Oral Tab Take 1 tablet (12.5 mg total) by mouth 2 (two) times daily with meals. HOLD if blood pressure is under 120 or heart rate under 60  0    lisinopril 20 MG Oral Tab Take 1 tablet (20 mg total) by mouth daily. 30 tablet 2    torsemide 10 MG Oral Tab Take 0.5 tablets (5 mg total) by mouth daily. 30 tablet 2    spironolactone 25 MG Oral Tab Take 0.5 tablets (12.5 mg total) by mouth daily. 30 tablet 2    acetaminophen 500 MG Oral Tab Take 2 tablets (1,000 mg total) by mouth 2 (two) times daily as needed for Pain. NON FORMULARY Take 1 tablet by mouth daily. Nervive -for peripheral neuropathy      Loperamide HCl 2 MG Oral Cap Take 1 capsule (2 mg total) by mouth 4 (four) times daily as needed. Multiple Vitamins-Minerals (MULTI VITAMIN/MINERALS) Oral Tab Take by mouth daily.         Past Medical History:   Diagnosis Date    B12 deficiency     on monthly shots - she gives herself    Back problem     Garcia esophagus     Garcia's esophagus determined by endoscopy     Chronic pain of both shoulders 10/13/2017    Chronic systolic heart failure (HCC)     Degenerative disc disease, lumbar     Diabetes ([address]     diet controlled    Essential hypertension     GERD (gastroesophageal reflux disease)     Hiatal hernia     Hyperlipidemia     Iron deficiency anemia     Mitral valve regurgitation     Osteoarthritis     Peripheral neuropathy     hands and feet    Spinal stenosis     Uterine cancer ([address]     hyst with bso and s/p radiation      Past Surgical History:   Procedure Laterality Date    APPENDECTOMY  1954    BIOPSY OF STOMACH,BY LAPAROTOMY  ,     CATARACT      bilat    COLONOSCOPY  2014    normal per pt. need records. per pt no more needed due to age also egd then but needs them intermittently. HYSTERECTOMY  1995    total    KNEE ARTHROSCOPY  1997    torn cartilage    OTHER SURGICAL HISTORY      ileum resected bc of radiation damage    RADIATION THERAPY MANAGEMENT        Family History   Problem Relation Age of Onset    Heart Disorder Father         mi at 59    Hypertension Mother         cva at 80      Social History     Socioeconomic History    Marital status:    Tobacco Use    Smoking status: Former     Packs/day: 0.20     Years: 3.00     Additional pack years: 0.00     Total pack years: 0.60     Types: Cigarettes     Quit date: 1967     Years since quittin.8     Passive exposure: Past    Smokeless tobacco: Never   Vaping Use    Vaping Use: Never used   Substance and Sexual Activity    Alcohol use:  Yes     Alcohol/week: 1.0 standard drink of alcohol     Types: 1 Glasses of wine per week     Comment: wine with dinner    Drug use: No   Other Topics Concern    Caffeine Concern Yes     Comment: 1 cup coffee daily    Exercise No           REVIEW OF SYSTEMS:   Denies nausea, fever, chills  No calf pain  No other muscle or joint aches  Denies chest pain or shortness of breath. EXAM:   There were no vitals taken for this visit. Constitutional:   Patient in no apparent distress. Well kept. Of normal body habitus. Alert and oriented to person, place, and time. Vascular Examination:  Pedal pulses are NP  Capillary refill is adequate  Edema is present bilateral ankles pitting  Varicosities present bilateral  Integumentary Examination:   The patient's nails appear incurvated, thickened, elongated, dystrophic, discolored with subungual debris 1-5 right, 1-5  left nails. Digital hair growth is absent  Skin is of diminished texture and decreased turgor. Callus right sub 5th met head, pre ulcerative callus left distal 3rd toe    Ulceration:     Etiology of ulceration:  Diabetic        Location & Measurements of each wound L x W x D in cm (before debridement if performed)     Wound A location: Left distal 3rd toe Length: HEALED       Description of the wound: fibrotic  Description of exudate: none  no evidence of infection   no evidence of undermining   no evidence of tunneling   yes evidence of reduced circulation  If evidence of infection is present document treatment/response:      Required for Non-Pressure Ulcers-Depth of Ulcer (each wound)  Limited to breakdown of skin:   Subcutaneous tissue exposed: yes  Muscle/Tendon Exposed without necrosis:  with necrosis:   Bone exposed without necrosis:  with necrosis:      Neurological Examination:  Monofilament (10-g) sensation is 5/5 to right and 5/5 to left. Sharp/dull is present to right and is present to left. Parasthesias absent. Musculoskeletal Examination:  Muscle Strength is 5/5.   Hammer digit deformity present digits 2-5  bilateral.          LABS & IMAGING:     Lab Results   Component Value Date     (H) 09/12/2023    BUN 27 (H) 09/12/2023    CREATSERUM 0.97 09/12/2023    BUNCREA 27.8 (H) 09/12/2023    ANIONGAP 8 09/12/2023    GFRAA 70 04/20/2022    GFRNAA 61 04/20/2022    CA 9.0 09/12/2023     09/12/2023    K 4.5 09/12/2023     09/12/2023    CO2 25.0 09/12/2023    OSMOCALC 302 (H) 09/12/2023        Lab Results   Component Value Date     (H) 02/24/2023    A1C 6.3 (H) 02/24/2023        No results found. ASSESSMENT AND PLAN:   Diagnoses and all orders for this visit:    Diabetic ulcer of toe of left foot associated with type 2 diabetes mellitus, with fat layer exposed ([address]    Foot callus    Hammertoes of both feet    Gait instability    Controlled type 2 diabetes mellitus without complication, without long-term current use of insulin ([address]              Plan:     Discussed in detail the etiology of ulceration. Discussed the importance of good hygiene and following conservative care instructions. Discussed the potential for infection and other risks, including osteomyelitis, if non-compliant. Patient verbalized understanding. Discussed the potential for loss of limb/life. Pt instructed on signs and symptoms of infection to watch for including N/F/V/C/SOB/CP, redness, increased drainage, malodor, etc. If any concern patient is to contact our office immediately or go to the Emergency Department. Pt acknowledge understanding. Evaluated left distal 3rd toe wound which has healed      Pt to cont wearing crest pad left 3rd toe, pt to wear during the day and remove at bedtime. Prescribed DM shoes with inserts    RTC 6 weeks to ensure left 3rd toe wound is still healed and will perform [facility]. No follow-ups on file.     Sharon Cleary DPM  11/7/23

## 2023-12-02 ENCOUNTER — HOSPITAL ENCOUNTER (EMERGENCY)
Facility: HOSPITAL | Age: 88
Discharge: HOME | End: 2023-12-02
Attending: EMERGENCY MEDICINE
Payer: MEDICARE

## 2023-12-02 VITALS
DIASTOLIC BLOOD PRESSURE: 86 MMHG | SYSTOLIC BLOOD PRESSURE: 137 MMHG | HEART RATE: 90 BPM | OXYGEN SATURATION: 98 % | TEMPERATURE: 97 F | RESPIRATION RATE: 18 BRPM
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
## 2023-12-02 LAB
ANION GAP SERPL CALC-SCNC: 4 MMOL/L (ref 0–18)
BASOPHILS # BLD AUTO: 0.04 X10(3) UL (ref 0–0.2)
BASOPHILS NFR BLD AUTO: 0.5 %
BILIRUB UR QL: NEGATIVE
BUN BLD-MCNC: 25 MG/DL (ref 9–23)
BUN/CREAT SERPL: 25 (ref 10–20)
CALCIUM BLD-MCNC: 9.8 MG/DL (ref 8.7–10.4)
CHLORIDE SERPL-SCNC: 105 MMOL/L (ref 98–112)
CLARITY UR: CLEAR
CO2 SERPL-SCNC: 28 MMOL/L (ref 21–32)
CREAT BLD-MCNC: 1 MG/DL
DEPRECATED RDW RBC AUTO: 45.4 FL (ref 35.1–46.3)
EGFRCR SERPLBLD CKD-EPI 2021: 53 ML/MIN/1.73M2 (ref 60–?)
EOSINOPHIL # BLD AUTO: 0.02 X10(3) UL (ref 0–0.7)
EOSINOPHIL NFR BLD AUTO: 0.2 %
ERYTHROCYTE [DISTWIDTH] IN BLOOD BY AUTOMATED COUNT: 13.1 % (ref 11–15)
GLUCOSE BLD-MCNC: 126 MG/DL (ref 70–99)
GLUCOSE BLDC GLUCOMTR-MCNC: 133 MG/DL (ref 70–99)
GLUCOSE UR-MCNC: NORMAL MG/DL
HCT VFR BLD AUTO: 28.8 %
HGB BLD-MCNC: 9.3 G/DL
HGB UR QL STRIP.AUTO: NEGATIVE
IMM GRANULOCYTES # BLD AUTO: 0.03 X10(3) UL (ref 0–1)
IMM GRANULOCYTES NFR BLD: 0.3 %
KETONES UR-MCNC: NEGATIVE MG/DL
LEUKOCYTE ESTERASE UR QL STRIP.AUTO: NEGATIVE
LYMPHOCYTES # BLD AUTO: 0.58 X10(3) UL (ref 1–4)
LYMPHOCYTES NFR BLD AUTO: 6.7 %
MCH RBC QN AUTO: 30.9 PG (ref 26–34)
MCHC RBC AUTO-ENTMCNC: 32.3 G/DL (ref 31–37)
MCV RBC AUTO: 95.7 FL
MONOCYTES # BLD AUTO: 0.73 X10(3) UL (ref 0.1–1)
MONOCYTES NFR BLD AUTO: 8.5 %
NEUTROPHILS # BLD AUTO: 7.22 X10 (3) UL (ref 1.5–7.7)
NEUTROPHILS # BLD AUTO: 7.22 X10(3) UL (ref 1.5–7.7)
NEUTROPHILS NFR BLD AUTO: 83.8 %
NITRITE UR QL STRIP.AUTO: NEGATIVE
OSMOLALITY SERPL CALC.SUM OF ELEC: 290 MOSM/KG (ref 275–295)
PH UR: 7 [PH] (ref 5–8)
PLATELET # BLD AUTO: 255 10(3)UL (ref 150–450)
POTASSIUM SERPL-SCNC: 4.4 MMOL/L (ref 3.5–5.1)
PROT UR-MCNC: NEGATIVE MG/DL
RBC # BLD AUTO: 3.01 X10(6)UL
SODIUM SERPL-SCNC: 137 MMOL/L (ref 136–145)
SP GR UR STRIP: 1.02 (ref 1–1.03)
UROBILINOGEN UR STRIP-ACNC: NORMAL
WBC # BLD AUTO: 8.6 X10(3) UL (ref 4–11)

## 2023-12-02 PROCEDURE — 99283 EMERGENCY DEPT VISIT LOW MDM: CPT

## 2023-12-02 PROCEDURE — 99284 EMERGENCY DEPT VISIT MOD MDM: CPT

## 2023-12-02 PROCEDURE — 80048 BASIC METABOLIC PNL TOTAL CA: CPT | Performed by: EMERGENCY MEDICINE

## 2023-12-02 PROCEDURE — 36415 COLL VENOUS BLD VENIPUNCTURE: CPT

## 2023-12-02 PROCEDURE — 85025 COMPLETE CBC W/AUTO DIFF WBC: CPT | Performed by: EMERGENCY MEDICINE

## 2023-12-02 PROCEDURE — 81003 URINALYSIS AUTO W/O SCOPE: CPT | Performed by: EMERGENCY MEDICINE

## 2023-12-02 PROCEDURE — 82962 GLUCOSE BLOOD TEST: CPT

## 2023-12-02 RX ORDER — TRAMADOL HYDROCHLORIDE 50 MG/1
TABLET ORAL EVERY 6 HOURS PRN
Qty: 10 TABLET | Refills: 0 | Status: SHIPPED | OUTPATIENT
Start: 2023-12-02 | End: 2023-12-02 | Stop reason: CLARIF

## 2023-12-02 NOTE — DISCHARGE INSTRUCTIONS
Follow-up with your primary physician for reevaluation. Return to the emergency department if pain or other new problems develop.
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
## 2023-12-02 NOTE — ED QUICK NOTES
Spoke to Daughter, Felicita Lopez, and updated regarding plan of care. Fay Lema  will take care of her until her arrival back tomorrow. Awaiting daughters call back for confirmation of  picking her up or transportation services needed.

## 2023-12-02 NOTE — ED QUICK NOTES
Patient remains alert and oriented. Showing no signs or symptoms of any respiratory distress. Ate dinner. Able to ambulate with a walker. Assisted to bathroom, and into a wheelchair. Discharge paperwork reviewed with patient, and verbalized understanding. Patient was transported home via private vehicle with son in law.

## 2023-12-12 ENCOUNTER — MED REC SCAN ONLY (OUTPATIENT)
Dept: INTERNAL MEDICINE CLINIC | Facility: CLINIC | Age: 88
End: 2023-12-12

## 2023-12-30 NOTE — TELEPHONE ENCOUNTER
Pt out of RX  Please review. Protocol failed/ No protocol      Requested Prescriptions   Pending Prescriptions Disp Refills    traMADol 50 MG Oral Tab 30 tablet 0     Sig: Take 1 tablet (50 mg total) by mouth every 8 (eight) hours as needed for Pain.        There is no refill protocol information for this order          Future Appointments         Provider Department Appt Notes    In 1 week Xin Mackay MD [address], [address], [place] difficulty hear and ear wax removal  appt booked by daughter    In 1 week [facility], [address], [place] difficulty hear and ear wax removal  appt booked by daughter    In 2 weeks Ck Samson DPM [facility], [address], [place] 6 weeks f.u             Recent Outpatient Visits              1 month ago Diabetic ulcer of toe of left foot associated with type 2 diabetes mellitus, with fat layer exposed ([facility] [address]    [address], [place], [place], [place]    Office Visit    2 months ago Diabetic ulcer of toe of left foot associated with type 2 diabetes mellitus, with fat layer exposed ([facility] [address]    [address], [address] [facility], [place], [place]    Office Visit    3 months ago Vitamin B 12 deficiency    [address], [address] [facility]    Nurse Only    3 months ago Mey Saravia annual wellness visit, subsequent    Cristina Engel MD    Office Visit    3 months ago Contusion of right elbow, initial encounter    [address], [address] [place] [address] Duane Rayas, MD    Office Visit

## 2023-12-31 NOTE — TELEPHONE ENCOUNTER
Please review. Protocol failed / No protocol. Requested Prescriptions   Pending Prescriptions Disp Refills    traMADol 50 MG Oral Tab 30 tablet 0     Sig: Take 1 tablet (50 mg total) by mouth every 8 (eight) hours as needed for Pain.        There is no refill protocol information for this order          Recent Outpatient Visits              1 month ago Diabetic ulcer of toe of left foot associated with type 2 diabetes mellitus, with fat layer exposed ([facility] [address]    [address]ary Norton, [address], [place], [place], [place]    Office Visit    2 months ago Diabetic ulcer of toe of left foot associated with type 2 diabetes mellitus, with fat layer exposed ([facility] [address] [facility]    Kary Norton, [address] [facility] [facility] [place]    Office Visit    3 months ago Vitamin B 12 deficiency    Kary Norton, [address] [facility]    Nurse Only    3 months ago Mey Saravia annual wellness visit, subsequent    Yanely Funse MD    Office Visit    3 months ago Contusion of right elbow, initial encounter    Kary Norton, [address] [place] [address] Izzy Olivarez MD    Office Visit            Future Appointments         Provider Department Appt Notes    In 1 week Darek Shirley MD [address] [facility] difficulty hear and ear wax removal  appt booked by daughter    In 1 week [facility], [address] [place] [address] difficulty hear and ear wax removal  appt booked by daughter    In 2 weeks [place] DPM [facility], [address] [facility], [place] 6 weeks f.u

## 2024-01-01 RX ORDER — TRAMADOL HYDROCHLORIDE 50 MG/1
50 TABLET ORAL EVERY 8 HOURS PRN
Qty: 30 TABLET | Refills: 0 | Status: SHIPPED | OUTPATIENT
Start: 2024-01-01

## 2024-01-04 ENCOUNTER — TELEPHONE (OUTPATIENT)
Dept: INTERNAL MEDICINE CLINIC | Facility: CLINIC | Age: 89
End: 2024-01-04

## 2024-01-04 NOTE — TELEPHONE ENCOUNTER
Call pt   Tell her  she cannot get tramadol from 2 providers  Controlled substance  Cancel my order for now

## 2024-01-04 NOTE — TELEPHONE ENCOUNTER
Called the [facility] pharmacy and [place] pharmacist says that she did not fill RX for Tramadol from Dr Guthrie because patient is getting refills from a provider by the name of Syed Petty.     RX filled by Dr. Petty November 30th and again Dec 30th with directions for BID dose.     Please advise [place] to see further filling history for this medication.     Should we cancel RX you sent 1/1/2024?

## 2024-01-04 NOTE — TELEPHONE ENCOUNTER
Rica from [facility]/pharm calling regarding rx Tramadol, needs to speak to nurse regarding two different providers, providing two separate directions. Please call at [phone], thanks.

## 2024-01-05 NOTE — TELEPHONE ENCOUNTER
Advised [facility] pharmacy Becki of Dr. Lai's note. Pharmacist verbalized understanding and will cancel Rx from Dr Lai.    Left message for patient to call back-transfer to triage.

## 2024-01-05 NOTE — TELEPHONE ENCOUNTER
I called number listed for patient and it's actually her daughters number, listed on SHANELL as someone we can talk with.     Advised that RX for tramadol being filled by another provider so our RX was cancelled. She states understanding and will talk with patient. Dr. Petty has been filling this for her and she will continue to reach out for refills if needed to that provider instead of us.     Advised RX sent by us has been cancelled, dtr states understanding.   . Patient Contact: Debora Echols               Relationship: DAUGHTER               Phone:

## 2024-01-26 DIAGNOSIS — G89.29 OTHER CHRONIC PAIN: Primary | ICD-10-CM

## 2024-01-26 NOTE — TELEPHONE ENCOUNTER
Patients daughter called to refill on tramadol. Pharmacy on file verified.     Patient states they got a 10 day refill from her cardiologist but refill should come from her primary. Patient is currently out, has been without medication for 1 week and she's grumpy.

## 2024-01-28 RX ORDER — TRAMADOL HYDROCHLORIDE 50 MG/1
50 TABLET ORAL EVERY 6 HOURS PRN
Qty: 30 TABLET | Refills: 1 | Status: SHIPPED | OUTPATIENT
Start: 2024-01-28

## 2024-01-29 NOTE — TELEPHONE ENCOUNTER
Pharmacist from [facility] states Tramadol was dispensed for 30 day supply on Nov 30 and Dec Dec 30 by  Dr.Anish Petty.     Pharmacist wanted to verify  was the prescribing doctor.    Verified that Dr.Anish Petty is cardiologist at [facility] and daughter was told by cardiologist that the medication should be coming from the PCP.

## 2024-02-23 ENCOUNTER — TELEPHONE (OUTPATIENT)
Dept: INTERNAL MEDICINE CLINIC | Facility: CLINIC | Age: 89
End: 2024-02-23

## 2024-02-23 NOTE — TELEPHONE ENCOUNTER
Pts daughter kristina on SHANELL  would like to order wheel chair with seat as well as rollator walker with seat. Pt will be entering [facility]. Pts daughter stts that jose guadalupe did assessment  and those are the 2 items they would like her to have. Please advise

## 2024-02-25 NOTE — TELEPHONE ENCOUNTER
Please assist with scheduling an appointment, medicare requires a face to face within 90 days indicating the need of medical supplies.

## 2024-02-27 DIAGNOSIS — G89.29 OTHER CHRONIC PAIN: ICD-10-CM

## 2024-02-27 RX ORDER — TRAMADOL HYDROCHLORIDE 50 MG/1
50 TABLET ORAL EVERY 6 HOURS PRN
Qty: 30 TABLET | Refills: 0 | Status: SHIPPED | OUTPATIENT
Start: 2024-02-27

## 2024-02-27 NOTE — TELEPHONE ENCOUNTER
Please review; protocol failed/ has no protocol      Lynn Gee19 minutes ago (1:06 PM)     GR  Per daughter, patient is out of medication         Requested Prescriptions   Pending Prescriptions Disp Refills    TRAMADOL 50 MG Oral Tab [Pharmacy Med Name: TRAMADOL 50MG TABLETS] 30 tablet 0     Sig: TAKE 1 TABLET(50 MG) BY MOUTH EVERY 6 HOURS AS NEEDED FOR PAIN       Controlled Substance Medication Failed - 2/27/2024  1:06 PM        Failed - This medication is a controlled substance - forward to provider to refill           Recent Outpatient Visits              3 months ago Diabetic ulcer of toe of left foot associated with type 2 diabetes mellitus, with fat layer exposed ([facility])    [facility], [place] MeshulamKishore, DPM    Office Visit    4 months ago Diabetic ulcer of toe of left foot associated with type 2 diabetes mellitus, with fat layer exposed ([facility])    [facility] Kishore Calderón, DPM    Office Visit    5 months ago Vitamin B 12 deficiency    [facility]    Nurse Only    5 months ago Medicare annual wellness visit, subsequent    [facility] Raymundo Lai MD    Office Visit    5 months ago Contusion of right elbow, initial encounter    [address] [place] Mary Galo MD    Office Visit

## 2024-05-09 RX ORDER — AMLODIPINE BESYLATE 10 MG/1
10 TABLET ORAL DAILY
Qty: 90 TABLET | Refills: 3 | OUTPATIENT
Start: 2024-05-09

## 2024-11-26 NOTE — TELEPHONE ENCOUNTER
Hypertensive Medications  Protocol Criteria:  · Appointment scheduled in the past 6 months or in the next 3 months  · BMP or CMP in the past 12 months  · Creatinine result < 2  Recent Outpatient Visits            2 months ago Bilateral chronic knee pain
VITAL SIGNS: I have reviewed nursing notes and confirm.  CONSTITUTIONAL: Well-developed; well-nourished; in no acute distress.  SKIN: Skin exam is warm and dry, no acute rash.  HEAD: Normocephalic; atraumatic.  EYES: conjunctiva and sclera clear.  ENT: No nasal discharge; airway clear. TMs clear.  NECK: Supple; non tender.  CARD: S1, S2 normal; no murmurs, gallops, or rubs. Regular rate and rhythm.  RESP: Normal respiratory effort, no tachypnea or distress. Lungs CTAB, no wheezes, rales or rhonchi.  ABD: soft, NT/ND.  EXT: Normal ROM. No clubbing, cyanosis or edema.  NEURO: Alert, non-verbal at baseline, can follow commands Grossly unremarkable. No focal deficits.  PSYCH: Cooperative, appropriate.

## 2025-04-15 ENCOUNTER — TELEPHONE (OUTPATIENT)
Dept: INTERNAL MEDICINE CLINIC | Facility: CLINIC | Age: OVER 89
End: 2025-04-15

## 2025-08-20 ENCOUNTER — TELEPHONE (OUTPATIENT)
Dept: INTERNAL MEDICINE CLINIC | Facility: CLINIC | Age: OVER 89
End: 2025-08-20

## (undated) DEVICE — Device: Brand: CUSTOM PROCEDURE KIT

## (undated) DEVICE — CONMED SCOPE SAVER BITE BLOCK, 20X27 MM: Brand: SCOPE SAVER

## (undated) DEVICE — Device: Brand: DEFENDO AIR/WATER/SUCTION AND BIOPSY VALVE

## (undated) DEVICE — LINE MNTR ADLT SET O2 INTMD

## (undated) DEVICE — SYRINGE/GUAGE ASSEMBLY

## (undated) DEVICE — FORCEP RADIAL JAW 4

## (undated) DEVICE — STERILE LATEX POWDER-FREE SURGICAL GLOVESWITH NITRILE COATING: Brand: PROTEXIS

## (undated) NOTE — MR AVS SNAPSHOT
[facility] [place]  [address] [address]  [phone]               Thank you for choosing us for your health care visit with Nina Fortune MD.  We are glad to serve you and happy to provide you with this summary of Loperamide HCl 2 MG Caps   Take 2 mg by mouth 4 (four) times daily as needed. Commonly known as:  IMODIUM           Metoprolol Tartrate 50 MG Tabs   Take 1 tablet (50 mg total) by mouth 2 (two) times daily.    Commonly known as:  LOPRESSOR           * [facility] Dx:  Hyperlipidemia, unspecified hyperlipidemia type [E78.5]           XR DEXA BONE DENSITOMETRY (CPT=77080)    Complete by:  Jan 24, 2017 (Approximate)    Assoc Dx:  Postmenopausal [Z78.0]           KANDICE SCREENING BILAT (CPT=77067)    Complete by: Your unique [email or web address] Access Code: [email or web address]  Expires: 2/27/2017  1:20 PM    If you have questions, you can call [phone] to talk to our [facility] Staff. Remember, [email or web address] is NOT to be used for urgent needs. For medical emergencies, dial 911.

## (undated) NOTE — LETTER
AUTHORIZATION FOR SURGICAL OPERATION OR OTHER PROCEDURE    1.  I hereby authorize Dr. Johana Stock and the [facility] staff assigned to my case to perform the following operation and/or procedure at the [facility]:      Bilateral knee joint corticosteroid inject Relationship to Patient:           []  Parent    Responsible person                          []  Spouse  In case of minor or                    [] Other  _____________   Incompetent name:  __________________________________________________

## (undated) NOTE — LETTER
PENNIE Notifier: [employer]   ADAM Patient Name: Jay Moreno  Identification Number: [id number]  10/25/1932      Advance Beneficiary Notice of Noncoverage (ABN)  NOTE:  If Medicare doesn’t pay for  Bilateral knee joint corticosteroid injections u ? OPTION 2. I want the D. listed above, but do not bill Medicare. You may ask to be paid now as I am responsible for payment. I cannot appeal if Medicare is not billed. ? OPTION 3. I don’t want the D. listed above.  I understand with this choice  I am no

## (undated) NOTE — LETTER
06/07/21        [address] [address]      Dear America Quiroz records indicate that you have outstanding lab work and or testing that was ordered for you and has not yet been completed:  Orders Placed This Encounter

## (undated) NOTE — IP AVS SNAPSHOT
[address]  [address] [facility] [phone]                Discharge Summary   5/27/2017    Dionisio Connors           Admission Information        Provider Department    5/27/2017 Fabio Bahena MD [facility] 3w/Sw Metoprolol Tartrate 50 MG Tabs   Last time this was given:  50 mg on 5/30/2017  8:11 AM   Commonly known as:  LOPRESSOR   Next dose due:  5/30/17 evening        Take 1 tablet (50 mg total) by mouth 2 (two) times daily.     Gavi Valdes (05/30/17)  5.7 (05/30/17)  3.24 (L) (05/30/17)  10.4 (L) (05/30/17)  31.1 (L) (05/30/17)  95.9 -- -- -- (05/30/17)  202 (05/30/17)  8.3    (05/29/17)  4.8 (05/29/17)  3.09 (L) (05/29/17)  9.8 (L) (05/29/17)  29.6 (L) (05/29/17)  96.0    (05/29/17)  192 ( - If you are a smoker or have smoked in the last 12 months, we encourage you to explore options for quitting.     - If you have concerns related to behavioral health issues or thoughts of harming yourself, contact [facility] a your medications while at home.          Blood Pressure and Cardiac Medications     AmLODIPine Besylate 10 MG Oral Tab    Benazepril HCl 40 MG Oral Tab    Metoprolol Tartrate 50 MG Oral Tab         Use: Treat abnormal blood pressure (high or low), cardiac c

## (undated) NOTE — LETTER
September 20, 2022         Susan Sr MD  [address] [facility]      Patient: Charles Valentine   YOB: 1932   Date of Visit: 9/20/2022       Dear Dr. Cr Murphy MD,    I saw your patient, Charles Valentine, on 9/20/2022. Enclosed is my consultation / progress note from that encounter. Thank you for allowing me to participate in the care of this patient.     Sincerely,                           Abhay Arvizu DPM  [address], [address] [facility] [address]    Document electronically generated by:  Abhay Arvizu DPM on 9/20/2022    CC: No Recipients    Enclosure

## (undated) NOTE — LETTER
08/07/18        [address] [facility]      Dear Antwon Razo records indicate that you have outstanding lab work and or testing that was ordered for you and has not yet been completed:          Assay, Thyroid Stim Hormone

## (undated) NOTE — LETTER
Paresh Bermudez [address]  [facility] [place], [facility] [place]  [address]  INFORMED CONSENT FOR TRANSFUSION OF BLOOD OR BLOOD PRODUCTS  My physician has informed me of the nature, purpose, benefits and risks of transfusion for blood and blood components that ______________________________________________  (Signature of Patient)                                                            (Responsible party in case of Minor,

## (undated) NOTE — LETTER
05/17/18        [address] [facility]      Dear Marlen Pike records indicate that you have outstanding lab work and or testing that was ordered for you and has not yet been completed:          Assay, Thyroid Stim Hormone